# Patient Record
Sex: FEMALE | Race: WHITE | NOT HISPANIC OR LATINO | Employment: FULL TIME | URBAN - METROPOLITAN AREA
[De-identification: names, ages, dates, MRNs, and addresses within clinical notes are randomized per-mention and may not be internally consistent; named-entity substitution may affect disease eponyms.]

---

## 2023-04-13 DIAGNOSIS — F41.9 ANXIETY: Primary | ICD-10-CM

## 2023-04-14 DIAGNOSIS — F41.9 ANXIETY: ICD-10-CM

## 2023-04-14 RX ORDER — PHENTERMINE HYDROCHLORIDE 37.5 MG/1
TABLET ORAL
COMMUNITY
Start: 2022-04-06 | End: 2023-05-25 | Stop reason: ALTCHOICE

## 2023-04-14 RX ORDER — BUPROPION HYDROCHLORIDE 150 MG/1
1 TABLET ORAL DAILY
COMMUNITY
Start: 2022-12-21 | End: 2023-05-25 | Stop reason: ALTCHOICE

## 2023-04-14 RX ORDER — SERTRALINE HYDROCHLORIDE 50 MG/1
50 TABLET, FILM COATED ORAL DAILY
Qty: 90 TABLET | Refills: 0 | OUTPATIENT
Start: 2023-04-14 | End: 2023-07-13

## 2023-04-14 RX ORDER — SERTRALINE HYDROCHLORIDE 50 MG/1
1 TABLET, FILM COATED ORAL DAILY
COMMUNITY
Start: 2019-10-25 | End: 2023-04-14 | Stop reason: SDUPTHER

## 2023-04-14 RX ORDER — PHENTERMINE AND TOPIRAMATE 7.5; 46 MG/1; MG/1
1 CAPSULE, EXTENDED RELEASE ORAL DAILY
COMMUNITY
Start: 2022-09-28 | End: 2023-05-25 | Stop reason: ALTCHOICE

## 2023-04-14 RX ORDER — SERTRALINE HYDROCHLORIDE 50 MG/1
50 TABLET, FILM COATED ORAL DAILY
Qty: 90 TABLET | Refills: 0 | Status: SHIPPED | OUTPATIENT
Start: 2023-04-14 | End: 2023-04-28 | Stop reason: SDUPTHER

## 2023-04-14 RX ORDER — NALTREXONE HYDROCHLORIDE 50 MG/1
1 TABLET, FILM COATED ORAL DAILY
COMMUNITY
Start: 2022-12-21 | End: 2023-05-25 | Stop reason: ALTCHOICE

## 2023-04-14 NOTE — TELEPHONE ENCOUNTER
Patient requesting 90 day supply per insurance. Last seen 11/21/22, next appt 5/4/23      Sent  AB

## 2023-04-24 LAB
ACTIVATED PARTIAL THROMBOPLASTIN TIME IN PPP BY COAGULATION ASSAY: 32 SEC (ref 26–39)
ALANINE AMINOTRANSFERASE (SGPT) (U/L) IN SER/PLAS: 12 U/L (ref 7–45)
ALBUMIN (G/DL) IN SER/PLAS: 3.8 G/DL (ref 3.4–5)
ALKALINE PHOSPHATASE (U/L) IN SER/PLAS: 61 U/L (ref 33–110)
ANION GAP IN SER/PLAS: 12 MMOL/L (ref 10–20)
ASPARTATE AMINOTRANSFERASE (SGOT) (U/L) IN SER/PLAS: 14 U/L (ref 9–39)
BILIRUBIN TOTAL (MG/DL) IN SER/PLAS: 0.4 MG/DL (ref 0–1.2)
CALCIUM (MG/DL) IN SER/PLAS: 10.6 MG/DL (ref 8.6–10.6)
CARBON DIOXIDE, TOTAL (MMOL/L) IN SER/PLAS: 24 MMOL/L (ref 21–32)
CHLORIDE (MMOL/L) IN SER/PLAS: 108 MMOL/L (ref 98–107)
CREATININE (MG/DL) IN SER/PLAS: 0.61 MG/DL (ref 0.5–1.05)
ERYTHROCYTE DISTRIBUTION WIDTH (RATIO) BY AUTOMATED COUNT: 13.1 % (ref 11.5–14.5)
ERYTHROCYTE MEAN CORPUSCULAR HEMOGLOBIN CONCENTRATION (G/DL) BY AUTOMATED: 32.8 G/DL (ref 32–36)
ERYTHROCYTE MEAN CORPUSCULAR VOLUME (FL) BY AUTOMATED COUNT: 89 FL (ref 80–100)
ERYTHROCYTES (10*6/UL) IN BLOOD BY AUTOMATED COUNT: 4.53 X10E12/L (ref 4–5.2)
GFR FEMALE: >90 ML/MIN/1.73M2
GLUCOSE (MG/DL) IN SER/PLAS: 83 MG/DL (ref 74–99)
HEMATOCRIT (%) IN BLOOD BY AUTOMATED COUNT: 40.5 % (ref 36–46)
HEMOGLOBIN (G/DL) IN BLOOD: 13.3 G/DL (ref 12–16)
INR IN PPP BY COAGULATION ASSAY: 0.9 (ref 0.9–1.1)
LEUKOCYTES (10*3/UL) IN BLOOD BY AUTOMATED COUNT: 7.6 X10E9/L (ref 4.4–11.3)
NRBC (PER 100 WBCS) BY AUTOMATED COUNT: 0 /100 WBC (ref 0–0)
PLATELETS (10*3/UL) IN BLOOD AUTOMATED COUNT: 279 X10E9/L (ref 150–450)
POTASSIUM (MMOL/L) IN SER/PLAS: 4.6 MMOL/L (ref 3.5–5.3)
PROTEIN TOTAL: 7 G/DL (ref 6.4–8.2)
PROTHROMBIN TIME (PT) IN PPP BY COAGULATION ASSAY: 10.7 SEC (ref 9.8–13.4)
SODIUM (MMOL/L) IN SER/PLAS: 139 MMOL/L (ref 136–145)
UREA NITROGEN (MG/DL) IN SER/PLAS: 17 MG/DL (ref 6–23)

## 2023-04-28 ENCOUNTER — TELEPHONE (OUTPATIENT)
Dept: PRIMARY CARE | Facility: CLINIC | Age: 53
End: 2023-04-28
Payer: COMMERCIAL

## 2023-04-28 DIAGNOSIS — F41.9 ANXIETY: ICD-10-CM

## 2023-04-28 RX ORDER — SERTRALINE HYDROCHLORIDE 50 MG/1
50 TABLET, FILM COATED ORAL DAILY
Qty: 14 TABLET | Refills: 0 | Status: SHIPPED | OUTPATIENT
Start: 2023-04-28 | End: 2023-05-25 | Stop reason: SDUPTHER

## 2023-04-28 NOTE — TELEPHONE ENCOUNTER
PATIENT IS WONDERING IF A WEEKS WORTH OF SERTRALINE CAN BE CALLED INTO [facility], SHE IS HAVING DELIVERY ISSUES WITH [facility].      Sent in med to pharmacy   AB

## 2023-05-04 ENCOUNTER — APPOINTMENT (OUTPATIENT)
Dept: PRIMARY CARE | Facility: CLINIC | Age: 53
End: 2023-05-04
Payer: COMMERCIAL

## 2023-05-08 ENCOUNTER — HOSPITAL ENCOUNTER (OUTPATIENT)
Dept: DATA CONVERSION | Facility: HOSPITAL | Age: 53
End: 2023-05-08
Attending: SURGERY | Admitting: SURGERY
Payer: COMMERCIAL

## 2023-05-08 DIAGNOSIS — E21.0 PRIMARY HYPERPARATHYROIDISM (MULTI): ICD-10-CM

## 2023-05-08 DIAGNOSIS — D34 BENIGN NEOPLASM OF THYROID GLAND: ICD-10-CM

## 2023-05-08 DIAGNOSIS — D35.1 BENIGN NEOPLASM OF PARATHYROID GLAND: ICD-10-CM

## 2023-05-08 DIAGNOSIS — E04.1 NONTOXIC SINGLE THYROID NODULE: ICD-10-CM

## 2023-05-08 DIAGNOSIS — F41.9 ANXIETY DISORDER, UNSPECIFIED: ICD-10-CM

## 2023-05-08 LAB
INTRAOPERATIVE PTH: 1898 PG/ML (ref 12–88)
INTRAOPERATIVE PTH: 55 PG/ML (ref 12–88)
INTRAOPERATIVE PTH: 57 PG/ML (ref 12–88)
INTRAOPERATIVE PTH: 80 PG/ML (ref 12–88)
INTRAOPERATIVE PTH: 80 PG/ML (ref 12–88)

## 2023-05-25 ENCOUNTER — OFFICE VISIT (OUTPATIENT)
Dept: PRIMARY CARE | Facility: CLINIC | Age: 53
End: 2023-05-25
Payer: COMMERCIAL

## 2023-05-25 VITALS
BODY MASS INDEX: 28.73 KG/M2 | SYSTOLIC BLOOD PRESSURE: 107 MMHG | TEMPERATURE: 97.8 F | WEIGHT: 178 LBS | DIASTOLIC BLOOD PRESSURE: 69 MMHG | HEART RATE: 64 BPM

## 2023-05-25 DIAGNOSIS — F41.9 ANXIETY: ICD-10-CM

## 2023-05-25 PROBLEM — E66.3 OVERWEIGHT (BMI 25.0-29.9): Status: ACTIVE | Noted: 2023-05-25

## 2023-05-25 PROBLEM — E04.9 EUTHYROID GOITER: Status: ACTIVE | Noted: 2023-05-25

## 2023-05-25 PROBLEM — E21.0 PRIMARY HYPERPARATHYROIDISM (MULTI): Status: ACTIVE | Noted: 2023-05-25

## 2023-05-25 PROBLEM — E83.52 HYPERCALCEMIA: Status: ACTIVE | Noted: 2023-05-25

## 2023-05-25 PROBLEM — E04.2 MULTINODULAR GOITER: Status: ACTIVE | Noted: 2023-05-25

## 2023-05-25 PROBLEM — R79.89 ELEVATED PARATHYROID HORMONE: Status: ACTIVE | Noted: 2023-05-25

## 2023-05-25 PROBLEM — R00.2 PALPITATIONS: Status: ACTIVE | Noted: 2023-05-25

## 2023-05-25 PROBLEM — F32.A DEPRESSION: Status: ACTIVE | Noted: 2023-05-25

## 2023-05-25 PROBLEM — E78.5 HYPERLIPIDEMIA: Status: ACTIVE | Noted: 2023-05-25

## 2023-05-25 LAB
COMPLETE PATHOLOGY REPORT: NORMAL
CONVERTED CLINICAL DIAGNOSIS-HISTORY: NORMAL
CONVERTED FINAL DIAGNOSIS: NORMAL
CONVERTED FINAL REPORT PDF LINK TO COPY AND PASTE: NORMAL
CONVERTED GROSS DESCRIPTION: NORMAL

## 2023-05-25 PROCEDURE — 1036F TOBACCO NON-USER: CPT | Performed by: FAMILY MEDICINE

## 2023-05-25 PROCEDURE — 99213 OFFICE O/P EST LOW 20 MIN: CPT | Performed by: FAMILY MEDICINE

## 2023-05-25 RX ORDER — SERTRALINE HYDROCHLORIDE 50 MG/1
50 TABLET, FILM COATED ORAL DAILY
Qty: 90 TABLET | Refills: 1 | Status: SHIPPED | OUTPATIENT
Start: 2023-05-25 | End: 2024-01-28 | Stop reason: SDUPTHER

## 2023-05-25 ASSESSMENT — ENCOUNTER SYMPTOMS
DEPRESSION: 0
LOSS OF SENSATION IN FEET: 0
OCCASIONAL FEELINGS OF UNSTEADINESS: 0

## 2023-05-25 ASSESSMENT — PATIENT HEALTH QUESTIONNAIRE - PHQ9
2. FEELING DOWN, DEPRESSED OR HOPELESS: NOT AT ALL
SUM OF ALL RESPONSES TO PHQ9 QUESTIONS 1 & 2: 0
1. LITTLE INTEREST OR PLEASURE IN DOING THINGS: NOT AT ALL

## 2023-05-25 ASSESSMENT — LIFESTYLE VARIABLES
HOW MANY STANDARD DRINKS CONTAINING ALCOHOL DO YOU HAVE ON A TYPICAL DAY: PATIENT DOES NOT DRINK
AUDIT-C TOTAL SCORE: 0
SKIP TO QUESTIONS 9-10: 1
HOW OFTEN DO YOU HAVE SIX OR MORE DRINKS ON ONE OCCASION: NEVER
HOW OFTEN DO YOU HAVE A DRINK CONTAINING ALCOHOL: NEVER

## 2023-05-25 NOTE — PROGRESS NOTES
Subjective   Patient ID: Alisa Bland is a 52 y.o. female who presents for Medication Refill.     HPI   She continues on sertraline 50 mg as it is controlling her symptoms well. She does not voice any side effects. Pt needs refill.  Anxiety and depression s/s I remission    She had a parathyroid surgery two weeks ago, and has a follow up visit in June. Her voice is still raspy. She notes it gets worse as the day goes on. She has not noticed any improvement as time goes on. She will call dr rapp's office and talk to his asst, she states was suppose to follow up in 2 weeks after surgery but could not get appt.     She is due for colonoscopy but would like to wait as she recovers from surgery     Review of Systems    Objective   /69   Pulse 64   Temp 36.6 °C (97.8 °F)   Wt 80.7 kg (178 lb)   BMI 28.73 kg/m²     Physical Exam  Constitutional:       Appearance: Normal appearance.   Neck:      Comments: Anterior incisions are clean and dry and healing well  Cardiovascular:      Rate and Rhythm: Normal rate and regular rhythm.      Pulses: Normal pulses.      Heart sounds: Normal heart sounds.   Pulmonary:      Effort: Pulmonary effort is normal.      Breath sounds: Normal breath sounds.   Musculoskeletal:         General: No tenderness. Normal range of motion.   Skin:     General: Skin is warm and dry.   Neurological:      Mental Status: She is alert and oriented to person, place, and time.   Psychiatric:         Mood and Affect: Mood normal.         Thought Content: Thought content normal.         Judgment: Judgment normal.         Assessment/Plan     Anxiety   Continue on sertraline 50 mg daily. Prescription sent to [facility].     Horse voice Secondary to surgery  Advised to call Surgeon office to ask about what to expect of voice.     Follow up in 6 months, unless a visit is needed sooner.     Scribe Attestation  By signing my name below, Savi LEVY , Paola   attest that this documentation has been  prepared under the direction and in the presence of Karlene Sandoval DO.

## 2023-05-25 NOTE — PATIENT INSTRUCTIONS
Anxiety   Continue on sertraline 50 mg daily. Prescription sent to [facility].     Horse voice Secondary to surgery  Advised to call Surgeon to ask about what to expect of voice.

## 2023-06-09 LAB
CALCIDIOL (25 OH VITAMIN D3) (NG/ML) IN SER/PLAS: 41 NG/ML
CALCIUM (MG/DL) IN SER/PLAS: 9.2 MG/DL (ref 8.6–10.6)
PARATHYRIN INTACT (PG/ML) IN SER/PLAS: 32.5 PG/ML (ref 18.5–88)
THYROTROPIN (MIU/L) IN SER/PLAS BY DETECTION LIMIT <= 0.05 MIU/L: 3.02 MIU/L (ref 0.44–3.98)
THYROXINE (T4) FREE (NG/DL) IN SER/PLAS: 1.02 NG/DL (ref 0.78–1.48)

## 2023-09-07 VITALS
SYSTOLIC BLOOD PRESSURE: 102 MMHG | HEART RATE: 60 BPM | HEIGHT: 66 IN | TEMPERATURE: 97.7 F | DIASTOLIC BLOOD PRESSURE: 60 MMHG | WEIGHT: 178.13 LBS | RESPIRATION RATE: 16 BRPM | BODY MASS INDEX: 28.63 KG/M2

## 2023-09-14 NOTE — H&P
History of Present Illness:   Pregnant/Lactating:  ·  Are You Pregnant no   ·  Are You Currently Breastfeeding no     History Present Illness:  Reason for surgery: Rt thyroid nodule and primary  hyperparathyroidism   HPI:    53 y/o WF with Rt thyroid nodule and primary hyperparathyroidism.  Her sestamibi and u/s show Rt inf adenoma.  She also has a known Rt thyroid nodule with prior benign  bx.      Allergies:        Allergies:  ·  No Known Allergies :     Home Medication Review:   Home Medications Reviewed: yes     Impression/Procedure:   ·  Impression and Planned Procedure: Rt Thyroid lobectomy and parathyroidectomy       ERAS (Enhanced Recovery After Surgery):  ·  ERAS Patient: no       Vital Signs:  Temperature C: 36.5 degrees C   Temperature F: 97.7 degrees F   Heart Rate: 60 beats per minute   Respiratory Rate: 16 breath per minute   Blood Pressure Systolic: 102 mm/Hg   Blood Pressure Diastolic: 60 mm/Hg     Physical Exam by System:    Constitutional: Well developed, awake/alert/oriented  x3, no distress, alert and cooperative   ENMT: mucous membranes moist, no apparent injury,  no lesions seen   Head/Neck: Palpable Rt thyroid nodule, moves easily  upon swallowing   Respiratory/Thorax: Patent airways, CTAB, normal  breath sounds with good chest expansion, thorax symmetric   Cardiovascular: Regular, rate and rhythm, no murmurs,  2+ equal pulses of the extremities, normal S 1and S 2     Consent:   COVID-19 Consent:  ·  COVID-19 Risk Consent Surgeon has reviewed key risks related to the risk of lg COVID-19 and if they contract COVID-19 what the risks are.       Electronic Signatures:  Sen Arnold)  (Signed 08-May-2023 07:15)   Authored: History of Present Illness, Allergies, Home  Medication Review, Impression/Procedure, ERAS, Physical Exam, Consent, Note Completion      Last Updated: 08-May-2023 07:15 by Sen Arnold)

## 2023-10-02 NOTE — OP NOTE
PROCEDURE DETAILS    Preoperative Diagnosis:  Primary Hyperparathyroidism; Multinodular Thyroid  Postoperative Diagnosis:  Primary Hyperparathyroidism; Multinodular Thyroid  Surgeon: Sen Arnold  Resident/Fellow/Other Assistant: Brie PGY2    Procedure:  1. Parathyroidectomy  2. Right thyroid lobectomy  Anesthesia: General  Estimated Blood Loss: 10cc  Findings: Enlarged right superior parathyroid adenoma, multinodular thyroid in bilateral lobes (R>L), PTH 80 preop, 1898 stimulated, 80 5min, 57 10min, 55 5min, Right RLN identified  Specimens(s) Collected: yes,  Right Superior Parathyroid, Right Thyroid Lobe   Complications: None at this time  Patient Returned To/Condition: PACU/Satisfactory    Date of Dictation: 08-May-2023  Dictation Job Number: [id number]                            Attestation:   Note Completion:  Attending Attestation I was present for the entire procedure    I am a: Resident/Fellow         Electronic Signatures:  Glo Baird (Resident))  (Signed 08-May-2023 10:24)   Authored: Post-Operative Note, Chart Review, Note Completion  Sen Arnold)  (Signed 08-May-2023 10:35)   Authored: Post-Operative Note, Chart Review, Note Completion   Co-Signer: Post-Operative Note, Chart Review, Note Completion      Last Updated: 08-May-2023 10:35 by Sen Arnold)

## 2023-10-20 DIAGNOSIS — E66.3 OVERWEIGHT (BMI 25.0-29.9): ICD-10-CM

## 2023-10-20 RX ORDER — PHENTERMINE HYDROCHLORIDE 37.5 MG/1
TABLET ORAL
COMMUNITY
End: 2023-10-20 | Stop reason: SDUPTHER

## 2023-10-20 RX ORDER — PHENTERMINE HYDROCHLORIDE 37.5 MG/1
37.5 CAPSULE ORAL
COMMUNITY
End: 2023-10-20 | Stop reason: SDUPTHER

## 2023-10-20 RX ORDER — PHENTERMINE HYDROCHLORIDE 37.5 MG/1
TABLET ORAL
Qty: 30 TABLET | Refills: 2 | Status: SHIPPED | OUTPATIENT
Start: 2023-10-20 | End: 2024-04-08 | Stop reason: SDUPTHER

## 2023-10-20 NOTE — TELEPHONE ENCOUNTER
Lov: 8/29/2023  Nov: 11/28/2023  Patient calling in to schedule group and requesting refill on Phentermine  Script pended

## 2023-10-24 ENCOUNTER — APPOINTMENT (OUTPATIENT)
Dept: ENDOCRINOLOGY | Facility: CLINIC | Age: 53
End: 2023-10-24
Payer: COMMERCIAL

## 2023-11-27 NOTE — PROGRESS NOTES
"Subjective   Alisaheather Waters \"Charlotte\" is a 53 y.o. female with a hx of *** who presents for weight management and obesity.    History of Present Illness  Ms. WATERS is a 53 year year old female with a history of primary HPT, hypercalcemia, meniscus tear, knee arthritis (s/p cortisol shots), who presents for medical weight management.     She sees Peter Roy for her hypercalcemia / primary HPT.  Referred to me to discuss weight management.     Nutrition: working on Mediterranean diet, has limited eating out since last meeting. \"I have a better mindset with food\"      Medication: is taking full tablet of Phentermine- \"seems to be working well\"      Exercise: has not increased walking (cortisone injections into knees.) Doing yoga and riding pelAyi Lailen bike 4 days a week      Stress: managed     Goal: increase water intake       1. Nutrition: ***    2. Sleep: ***     3. Stress: ***     4. Exercise: ***     5. Appetite control: ***  AOM currently taking: ***    6. Goal: ***      Current Outpatient Medications:     phentermine (Adipex-P) 37.5 mg tablet, TAKE 1 TABLET BY MOUTH ONCE DAILY., BMI 29 *NOT COVERED, Disp: 30 tablet, Rfl: 2    sertraline (Zoloft) 50 mg tablet, Take 1 tablet (50 mg) by mouth once daily., Disp: 90 tablet, Rfl: 1    ROS:  System: normal  Eyes : no visual changes  Neck : no tenderness, no new lumps/bumps  Respiratory : no SOB  Cardiovascular : no chest pain, no palpitations  Gastro-Intestinal : no abdominal concerns  Neurological : no numbness or tingling in the extremities  Musculoskeletal : no joint paint, no muscle pain  Skin : no unusual rashes  Psychiatric : no depression, no anxiety  See HPI for Endocrine ROS    Past Medical History:   Diagnosis Date    Personal history of other specified conditions 08/17/2018    History of fatigue    Personal history of other specified conditions 01/29/2016    History of weight gain       Past Surgical History:   Procedure Laterality Date    BREAST SURGERY  " 02/24/2015    Breast Surgery       Social History     Socioeconomic History    Marital status:      Spouse name: Not on file    Number of children: Not on file    Years of education: Not on file    Highest education level: Not on file   Occupational History    Not on file   Tobacco Use    Smoking status: Never    Smokeless tobacco: Never   Substance and Sexual Activity    Alcohol use: Not Currently    Drug use: Not Currently    Sexual activity: Not on file   Other Topics Concern    Not on file   Social History Narrative    Not on file     Social Determinants of Health     Financial Resource Strain: Not on file   Food Insecurity: Not on file   Transportation Needs: Not on file   Physical Activity: Not on file   Stress: Not on file   Social Connections: Not on file   Intimate Partner Violence: Not on file   Housing Stability: Not on file       Objective    Physical Exam:  There were no vitals taken for this visit.  General : alert and oriented X3, no acute distress  Eyes : EOMI     Provider Impressions        Ms. WATERS is a 53 year year old female with a history of primary HPT, hypercalcemia, meniscus tear, knee arthritis (s/p cortisol shots), who presents for medical weight management.     She sees Peter Roy for her hypercalcemia / primary HPT.     1. Weight Management : Reviewed the principles of energy metabolism, caloric intake and expenditure, and rationale for a treatment program. Also reinforced need for reduced calorie, low fat diet and increased physical activity.     2. Nutrition : She is currently doing weight watchers  4/6/22 : 184lb, 29.72kg/m2  5/2/22: 178.2lb, 28.75kg/m2  6/1/22: 171.8lb, 27.68kg/m2  8/23/22: 169lb, 27.28kg/m2  9/28/22 : 166lb, 26.83kg/m2  10/25/22 : 168lb, 27kg/m2  12/21/22 : 169lb, 27.88kg/m2  2/8/23 : 170lb, 27.44kg/m2  7/11/23 : 181lb, 29.21kg/m2  8/29/23: 177.7lb, 28.64kg/m2     -- s/p parathyroidectomy -- feels SO much more energetic since the surgery!     3. Sleep :  is fine.     4. Stress : stable.     5. Exercise : minimal now, besides doing more Yoga.  got knee steroid injections.     6. Appetite : Taking Phentermine half tablet a day. Helping with appetite and helping to make her feel more full. Feels that it has helped her stay in control and fight cravings.   4/6/22 : took phentermine for 3 months.     Tried : Qsymia - but she had side effects of extreme fatigue - stopped.  Wrote for: Buproprion XL 150mg/d, and Naltrexone 50mg --NEVER TOOK.     --would like to re-start phentermine, contract signed and will check OAR[place].  doing well on a full tablet of phentermine a day.     7. Goal : to up her water.     FU in about a month.  Kris Tapia MD

## 2023-11-28 ENCOUNTER — APPOINTMENT (OUTPATIENT)
Dept: ENDOCRINOLOGY | Facility: CLINIC | Age: 53
End: 2023-11-28
Payer: COMMERCIAL

## 2023-12-07 ENCOUNTER — OFFICE VISIT (OUTPATIENT)
Dept: OBSTETRICS AND GYNECOLOGY | Facility: CLINIC | Age: 53
End: 2023-12-07
Payer: COMMERCIAL

## 2023-12-07 VITALS
HEIGHT: 66 IN | SYSTOLIC BLOOD PRESSURE: 110 MMHG | DIASTOLIC BLOOD PRESSURE: 74 MMHG | BODY MASS INDEX: 28.93 KG/M2 | WEIGHT: 180 LBS

## 2023-12-07 DIAGNOSIS — Z12.31 ENCOUNTER FOR SCREENING MAMMOGRAM FOR BREAST CANCER: ICD-10-CM

## 2023-12-07 DIAGNOSIS — Z01.419 WELL WOMAN EXAM: ICD-10-CM

## 2023-12-07 PROCEDURE — 1036F TOBACCO NON-USER: CPT | Performed by: OBSTETRICS & GYNECOLOGY

## 2023-12-07 PROCEDURE — 88175 CYTOPATH C/V AUTO FLUID REDO: CPT

## 2023-12-07 PROCEDURE — 99396 PREV VISIT EST AGE 40-64: CPT | Performed by: OBSTETRICS & GYNECOLOGY

## 2023-12-07 ASSESSMENT — PAIN SCALES - GENERAL: PAINLEVEL: 0-NO PAIN

## 2023-12-07 NOTE — PROGRESS NOTES
"Alisa Bland \"David" is a 53 y.o. female who is here for a routine exam. PCP = Karlene Sandoval DO  Had parathyroid and thyroid surgery    Menses : may 2023  Contraception : othernone  HPV vaccine : No  Last pap : 2022 normal  Last HPV : 2022 negative  History of abnormal pap : No  Last mammogram : 2022 normal  History of abnormal mammogram : No  Colon cancer screen : has order to schedule colonoscopy    ROS  systems reviewed, anything negative noted in HPI    bladder: no dysuria, gross hematuria, urinary frequency, urgency or incontinence  breast: no lumps, nipple d/c, overlying skin changes, redness, or skin retraction    [unfilled]    Past med hx and past surg hx reviewed and notable for: see above    Objective   /74   Ht 1.676 m (5' 6\")   Wt 81.6 kg (180 lb)   BMI 29.05 kg/m²      General:   Alert and oriented, in no acute distress   Neck: Supple. No visible thyromegaly.    Breast/Axilla: Normal to palpation bilaterally without masses, skin changes, lymphadenopathy, or nipple discharge.    Abdomen: Soft, non-tender, without masses or organomegaly   Vulva: Normal architecture without erythema, masses, or lesions.    Vagina: Normal mucosa without lesions, masses, or atrophy. No abnormal vaginal discharge.    Cervix: Normal without masses, lesions, or signs of cervicitis.    Uterus: Normal mobile, non-enlarged uterus    Adnexa: Normal without masses or lesions   Pelvic Floor No POP noted.    Psych Normal affect. Normal mood.      Thank you for coming to your annual exam. Your findings during the exam were normal. Specific topics addressed during this exam included: healthy lifestyle, well woman screening guidelines,     Actions performed during this visit include:  - Clinical breast exam  - Clinical pelvic exam  - pap  - mammogram per PCP  No orders of the defined types were placed in this encounter.          Please return for your next visit in 1 year.  "

## 2024-01-09 LAB
CYTOLOGY CMNT CVX/VAG CYTO-IMP: NORMAL
LAB AP HPV GENOTYPE QUESTION: YES
LAB AP HPV HR: NORMAL
LABORATORY COMMENT REPORT: NORMAL
PATH REPORT.TOTAL CANCER: NORMAL

## 2024-01-09 NOTE — PROGRESS NOTES
History of Present Illness  Ms. WATERS is a 53 year year old female with a history of primary HPT, hypercalcemia, meniscus tear, knee arthritis (s/p cortisol shots), who presents for medical weight management.     She sees Peter Roy for her hypercalcemia / primary HPT.    VIRTUAL:  1. Nutrition: Went off track over the holidays. Is starting a whole reset to get back on track. Eats salad with every meal and eats the salad first. Trying to cut back evening snacking.     2. Sleep: stable      3. Stress:  managed     4. Exercise:  Started back exercising since Jan 1st- 20 minutes of yoga daily     5. Appetite control: controlled   AOM currently taking: Phentermine     6. Goal: cut back on after dinner snacking        Current Outpatient Medications:     phentermine (Adipex-P) 37.5 mg tablet, TAKE 1 TABLET BY MOUTH ONCE DAILY., BMI 29 *NOT COVERED, Disp: 30 tablet, Rfl: 2    sertraline (Zoloft) 50 mg tablet, Take 1 tablet (50 mg) by mouth once daily., Disp: 90 tablet, Rfl: 1    ROS:  System: normal  Eyes : no visual changes  Neck : no tenderness, no new lumps/bumps  Respiratory : no SOB  Cardiovascular : no chest pain, no palpitations  Gastro-Intestinal : no abdominal concerns  Neurological : no numbness or tingling in the extremities  Musculoskeletal : no joint paint, no muscle pain  Skin : no unusual rashes  Psychiatric : no depression, no anxiety  See HPI for Endocrine ROS    Past Medical History:   Diagnosis Date    Personal history of other specified conditions 08/17/2018    History of fatigue    Personal history of other specified conditions 01/29/2016    History of weight gain       Past Surgical History:   Procedure Laterality Date    BREAST SURGERY  02/24/2015    Breast Surgery       Social History     Socioeconomic History    Marital status:      Spouse name: Not on file    Number of children: Not on file    Years of education: Not on file    Highest education level: Not on file   Occupational History  "   Not on file   Tobacco Use    Smoking status: Never    Smokeless tobacco: Never   Substance and Sexual Activity    Alcohol use: Not Currently    Drug use: Not Currently    Sexual activity: Not on file   Other Topics Concern    Not on file   Social History Narrative    Not on file     Social Determinants of Health     Financial Resource Strain: Not on file   Food Insecurity: Not on file   Transportation Needs: Not on file   Physical Activity: Not on file   Stress: Not on file   Social Connections: Not on file   Intimate Partner Violence: Not on file   Housing Stability: Not on file       Objective    Physical Exam:  There were no vitals taken for this visit.  General : alert and oriented X3, no acute distress  Eyes : EOMI     Provider Impressions  Ms. WATERS is a 53 year year old female with a history of primary HPT, hypercalcemia, meniscus tear, knee arthritis (s/p cortisol shots), who presents for medical weight management.     She sees Peter Roy for her hypercalcemia / primary HPT.     1. Weight Management : Reviewed the principles of energy metabolism, caloric intake and expenditure, and rationale for a treatment program. Also reinforced need for reduced calorie, low fat diet and increased physical activity.     2. Nutrition : She is currently doing weight watchers  4/6/22 : 184lb, 29.72kg/m2  5/2/22: 178.2lb, 28.75kg/m2  6/1/22: 171.8lb, 27.68kg/m2  8/23/22: 169lb, 27.28kg/m2  9/28/22 : 166lb, 26.83kg/m2  10/25/22 : 168lb, 27kg/m2  12/21/22 : 169lb, 27.88kg/m2  2/8/23 : 170lb, 27.44kg/m2  7/11/23 : 181lb, 29.21kg/m2  8/29/23: 177.7lb, 28.64kg/m2  1/10/24: 178.2lb, 28.76kg/m2     3. Sleep : is fine.     4. Stress : was high around the holidays and having her kids at home.  She felt like she was a \"hot mess\".     5. Exercise : minimal now, besides doing more Yoga.  got knee steroid injections.     6. Appetite : Taking Phentermine half tablet a day. Helping with appetite and helping to make her feel more " full. Feels that it has helped her stay in control and fight cravings.   4/6/22 : took phentermine for 3 months.     Tried : Qsymia - but she had side effects of extreme fatigue - stopped.  Wrote for: Buproprion XL 150mg/d, and Naltrexone 50mg --NEVER TOOK.     --taking phentermine.     7. Goal : to focus on decreasing the after dinner snacks.     FU in about a month.  Revital Vicente Tapia MD

## 2024-01-10 ENCOUNTER — OFFICE VISIT (OUTPATIENT)
Dept: ENDOCRINOLOGY | Facility: CLINIC | Age: 54
End: 2024-01-10
Payer: COMMERCIAL

## 2024-01-10 VITALS — HEIGHT: 66 IN | BODY MASS INDEX: 28.64 KG/M2 | WEIGHT: 178.2 LBS

## 2024-01-10 DIAGNOSIS — Z76.89 ENCOUNTER FOR WEIGHT MANAGEMENT: ICD-10-CM

## 2024-01-10 DIAGNOSIS — E66.3 OVERWEIGHT: ICD-10-CM

## 2024-01-10 PROCEDURE — 3008F BODY MASS INDEX DOCD: CPT | Performed by: INTERNAL MEDICINE

## 2024-01-10 PROCEDURE — 99215 OFFICE O/P EST HI 40 MIN: CPT | Performed by: INTERNAL MEDICINE

## 2024-01-10 PROCEDURE — 1036F TOBACCO NON-USER: CPT | Performed by: INTERNAL MEDICINE

## 2024-01-17 DIAGNOSIS — F41.9 ANXIETY: ICD-10-CM

## 2024-01-26 ENCOUNTER — TELEPHONE (OUTPATIENT)
Dept: PRIMARY CARE | Facility: CLINIC | Age: 54
End: 2024-01-26
Payer: COMMERCIAL

## 2024-01-26 RX ORDER — SERTRALINE HYDROCHLORIDE 50 MG/1
50 TABLET, FILM COATED ORAL DAILY
Qty: 90 TABLET | Refills: 1 | OUTPATIENT
Start: 2024-01-26

## 2024-01-26 NOTE — TELEPHONE ENCOUNTER
Patient needs refill of sertraline 50 mg [facility] [address]  Next office visit 2/29/2024.  Patient would also like order for labs if needed.

## 2024-01-28 DIAGNOSIS — F41.9 ANXIETY: ICD-10-CM

## 2024-01-28 DIAGNOSIS — Z00.00 PHYSICAL EXAM: Primary | ICD-10-CM

## 2024-01-28 RX ORDER — SERTRALINE HYDROCHLORIDE 50 MG/1
50 TABLET, FILM COATED ORAL DAILY
Qty: 30 TABLET | Refills: 0 | Status: SHIPPED | OUTPATIENT
Start: 2024-01-28 | End: 2024-03-07 | Stop reason: SDUPTHER

## 2024-02-07 ENCOUNTER — HOSPITAL ENCOUNTER (OUTPATIENT)
Dept: RADIOLOGY | Facility: CLINIC | Age: 54
Discharge: HOME | End: 2024-02-07
Payer: COMMERCIAL

## 2024-02-07 VITALS — BODY MASS INDEX: 28.91 KG/M2 | WEIGHT: 179.9 LBS | HEIGHT: 66 IN

## 2024-02-07 DIAGNOSIS — Z12.31 ENCOUNTER FOR SCREENING MAMMOGRAM FOR MALIGNANT NEOPLASM OF BREAST: ICD-10-CM

## 2024-02-07 PROCEDURE — 77067 SCR MAMMO BI INCL CAD: CPT

## 2024-02-07 PROCEDURE — 77063 BREAST TOMOSYNTHESIS BI: CPT | Performed by: STUDENT IN AN ORGANIZED HEALTH CARE EDUCATION/TRAINING PROGRAM

## 2024-02-07 PROCEDURE — 77067 SCR MAMMO BI INCL CAD: CPT | Performed by: STUDENT IN AN ORGANIZED HEALTH CARE EDUCATION/TRAINING PROGRAM

## 2024-02-12 ENCOUNTER — TELEPHONE (OUTPATIENT)
Dept: PRIMARY CARE | Facility: CLINIC | Age: 54
End: 2024-02-12
Payer: COMMERCIAL

## 2024-02-12 NOTE — TELEPHONE ENCOUNTER
----- Message from Karlene Sandoval DO sent at 2/9/2024 10:28 PM EST -----  Report to pt her mamm is wnl and repeat one year

## 2024-02-21 ENCOUNTER — APPOINTMENT (OUTPATIENT)
Dept: ENDOCRINOLOGY | Facility: CLINIC | Age: 54
End: 2024-02-21
Payer: COMMERCIAL

## 2024-02-26 DIAGNOSIS — F41.9 ANXIETY: ICD-10-CM

## 2024-02-29 ENCOUNTER — APPOINTMENT (OUTPATIENT)
Dept: PRIMARY CARE | Facility: CLINIC | Age: 54
End: 2024-02-29
Payer: COMMERCIAL

## 2024-03-04 PROBLEM — N39.0 ACUTE UTI: Status: ACTIVE | Noted: 2024-03-04

## 2024-03-04 PROBLEM — N92.6 MENSTRUAL DISORDER: Status: ACTIVE | Noted: 2024-03-04

## 2024-03-04 PROBLEM — D34 BENIGN NEOPLASM OF THYROID GLAND: Status: ACTIVE | Noted: 2024-03-04

## 2024-03-04 PROBLEM — S83.209A TEAR OF MENISCUS OF KNEE: Status: ACTIVE | Noted: 2024-03-04

## 2024-03-04 PROBLEM — R63.5 WEIGHT GAIN: Status: ACTIVE | Noted: 2024-03-04

## 2024-03-04 PROBLEM — R39.9 SYMPTOMS INVOLVING URINARY SYSTEM: Status: ACTIVE | Noted: 2024-03-04

## 2024-03-04 PROBLEM — E89.2 STATUS POST PARATHYROIDECTOMY (CMS/HCC): Status: ACTIVE | Noted: 2024-03-04

## 2024-03-04 PROBLEM — R53.83 FATIGUE: Status: ACTIVE | Noted: 2024-03-04

## 2024-03-04 PROBLEM — E89.0 HISTORY OF PARTIAL THYROIDECTOMY: Status: ACTIVE | Noted: 2024-03-04

## 2024-03-04 PROBLEM — M25.561 RIGHT KNEE PAIN: Status: ACTIVE | Noted: 2024-03-04

## 2024-03-04 PROBLEM — R49.0 HOARSENESS OF VOICE: Status: ACTIVE | Noted: 2024-03-04

## 2024-03-04 PROBLEM — M17.12 PRIMARY OSTEOARTHRITIS OF LEFT KNEE: Status: ACTIVE | Noted: 2024-03-04

## 2024-03-06 ENCOUNTER — LAB (OUTPATIENT)
Dept: LAB | Facility: LAB | Age: 54
End: 2024-03-06
Payer: COMMERCIAL

## 2024-03-06 DIAGNOSIS — Z00.00 PHYSICAL EXAM: ICD-10-CM

## 2024-03-06 LAB
ALBUMIN SERPL BCP-MCNC: 3.9 G/DL (ref 3.4–5)
ALP SERPL-CCNC: 48 U/L (ref 33–110)
ALT SERPL W P-5'-P-CCNC: 9 U/L (ref 7–45)
ANION GAP SERPL CALC-SCNC: 12 MMOL/L (ref 10–20)
AST SERPL W P-5'-P-CCNC: 15 U/L (ref 9–39)
BASOPHILS # BLD AUTO: 0.08 X10*3/UL (ref 0–0.1)
BASOPHILS NFR BLD AUTO: 1.1 %
BILIRUB SERPL-MCNC: 0.4 MG/DL (ref 0–1.2)
BUN SERPL-MCNC: 21 MG/DL (ref 6–23)
CALCIUM SERPL-MCNC: 9.5 MG/DL (ref 8.6–10.6)
CHLORIDE SERPL-SCNC: 107 MMOL/L (ref 98–107)
CHOLEST SERPL-MCNC: 171 MG/DL (ref 0–199)
CHOLESTEROL/HDL RATIO: 5.4
CO2 SERPL-SCNC: 26 MMOL/L (ref 21–32)
CREAT SERPL-MCNC: 0.78 MG/DL (ref 0.5–1.05)
EGFRCR SERPLBLD CKD-EPI 2021: >90 ML/MIN/1.73M*2
EOSINOPHIL # BLD AUTO: 0.35 X10*3/UL (ref 0–0.7)
EOSINOPHIL NFR BLD AUTO: 5 %
ERYTHROCYTE [DISTWIDTH] IN BLOOD BY AUTOMATED COUNT: 12.5 % (ref 11.5–14.5)
GLUCOSE SERPL-MCNC: 87 MG/DL (ref 74–99)
HCT VFR BLD AUTO: 39.8 % (ref 36–46)
HDLC SERPL-MCNC: 31.9 MG/DL
HGB BLD-MCNC: 12.7 G/DL (ref 12–16)
IMM GRANULOCYTES # BLD AUTO: 0.01 X10*3/UL (ref 0–0.7)
IMM GRANULOCYTES NFR BLD AUTO: 0.1 % (ref 0–0.9)
LDLC SERPL CALC-MCNC: 95 MG/DL
LYMPHOCYTES # BLD AUTO: 2.42 X10*3/UL (ref 1.2–4.8)
LYMPHOCYTES NFR BLD AUTO: 34.8 %
MCH RBC QN AUTO: 28.1 PG (ref 26–34)
MCHC RBC AUTO-ENTMCNC: 31.9 G/DL (ref 32–36)
MCV RBC AUTO: 88 FL (ref 80–100)
MONOCYTES # BLD AUTO: 0.5 X10*3/UL (ref 0.1–1)
MONOCYTES NFR BLD AUTO: 7.2 %
NEUTROPHILS # BLD AUTO: 3.6 X10*3/UL (ref 1.2–7.7)
NEUTROPHILS NFR BLD AUTO: 51.8 %
NON HDL CHOLESTEROL: 139 MG/DL (ref 0–149)
NRBC BLD-RTO: 0 /100 WBCS (ref 0–0)
PLATELET # BLD AUTO: 280 X10*3/UL (ref 150–450)
POTASSIUM SERPL-SCNC: 4.7 MMOL/L (ref 3.5–5.3)
PROT SERPL-MCNC: 7.3 G/DL (ref 6.4–8.2)
RBC # BLD AUTO: 4.52 X10*6/UL (ref 4–5.2)
SODIUM SERPL-SCNC: 140 MMOL/L (ref 136–145)
TRIGL SERPL-MCNC: 222 MG/DL (ref 0–149)
VLDL: 44 MG/DL (ref 0–40)
WBC # BLD AUTO: 7 X10*3/UL (ref 4.4–11.3)

## 2024-03-06 PROCEDURE — 85025 COMPLETE CBC W/AUTO DIFF WBC: CPT

## 2024-03-06 PROCEDURE — 80053 COMPREHEN METABOLIC PANEL: CPT

## 2024-03-06 PROCEDURE — 80061 LIPID PANEL: CPT

## 2024-03-06 PROCEDURE — 36415 COLL VENOUS BLD VENIPUNCTURE: CPT

## 2024-03-07 ENCOUNTER — OFFICE VISIT (OUTPATIENT)
Dept: PRIMARY CARE | Facility: CLINIC | Age: 54
End: 2024-03-07
Payer: COMMERCIAL

## 2024-03-07 VITALS
HEIGHT: 66 IN | TEMPERATURE: 97.3 F | BODY MASS INDEX: 28.93 KG/M2 | RESPIRATION RATE: 16 BRPM | HEART RATE: 77 BPM | OXYGEN SATURATION: 94 % | DIASTOLIC BLOOD PRESSURE: 77 MMHG | SYSTOLIC BLOOD PRESSURE: 118 MMHG | WEIGHT: 180 LBS

## 2024-03-07 DIAGNOSIS — M79.642 HAND PAIN, LEFT: ICD-10-CM

## 2024-03-07 DIAGNOSIS — Z00.00 PHYSICAL EXAM: Primary | ICD-10-CM

## 2024-03-07 DIAGNOSIS — F41.9 ANXIETY: ICD-10-CM

## 2024-03-07 DIAGNOSIS — Z82.49 FH: CAD (CORONARY ARTERY DISEASE): ICD-10-CM

## 2024-03-07 DIAGNOSIS — F32.4 MAJOR DEPRESSIVE DISORDER WITH SINGLE EPISODE, IN PARTIAL REMISSION (CMS-HCC): ICD-10-CM

## 2024-03-07 DIAGNOSIS — E21.0 PRIMARY HYPERPARATHYROIDISM (MULTI): ICD-10-CM

## 2024-03-07 PROCEDURE — 99213 OFFICE O/P EST LOW 20 MIN: CPT | Performed by: FAMILY MEDICINE

## 2024-03-07 PROCEDURE — 99396 PREV VISIT EST AGE 40-64: CPT | Performed by: FAMILY MEDICINE

## 2024-03-07 PROCEDURE — 3008F BODY MASS INDEX DOCD: CPT | Performed by: FAMILY MEDICINE

## 2024-03-07 PROCEDURE — 1036F TOBACCO NON-USER: CPT | Performed by: FAMILY MEDICINE

## 2024-03-07 RX ORDER — SERTRALINE HYDROCHLORIDE 50 MG/1
50 TABLET, FILM COATED ORAL DAILY
Qty: 90 TABLET | Refills: 1 | Status: SHIPPED | OUTPATIENT
Start: 2024-03-07

## 2024-03-07 ASSESSMENT — PATIENT HEALTH QUESTIONNAIRE - PHQ9
SUM OF ALL RESPONSES TO PHQ9 QUESTIONS 1 AND 2: 2
7. TROUBLE CONCENTRATING ON THINGS, SUCH AS READING THE NEWSPAPER OR WATCHING TELEVISION: SEVERAL DAYS
10. IF YOU CHECKED OFF ANY PROBLEMS, HOW DIFFICULT HAVE THESE PROBLEMS MADE IT FOR YOU TO DO YOUR WORK, TAKE CARE OF THINGS AT HOME, OR GET ALONG WITH OTHER PEOPLE: SOMEWHAT DIFFICULT
8. MOVING OR SPEAKING SO SLOWLY THAT OTHER PEOPLE COULD HAVE NOTICED. OR THE OPPOSITE, BEING SO FIGETY OR RESTLESS THAT YOU HAVE BEEN MOVING AROUND A LOT MORE THAN USUAL: NOT AT ALL
2. FEELING DOWN, DEPRESSED OR HOPELESS: SEVERAL DAYS
6. FEELING BAD ABOUT YOURSELF - OR THAT YOU ARE A FAILURE OR HAVE LET YOURSELF OR YOUR FAMILY DOWN: NOT AT ALL
4. FEELING TIRED OR HAVING LITTLE ENERGY: MORE THAN HALF THE DAYS
1. LITTLE INTEREST OR PLEASURE IN DOING THINGS: SEVERAL DAYS
9. THOUGHTS THAT YOU WOULD BE BETTER OFF DEAD, OR OF HURTING YOURSELF: NOT AT ALL
3. TROUBLE FALLING OR STAYING ASLEEP OR SLEEPING TOO MUCH: SEVERAL DAYS
5. POOR APPETITE OR OVEREATING: MORE THAN HALF THE DAYS
SUM OF ALL RESPONSES TO PHQ QUESTIONS 1-9: 8

## 2024-03-07 ASSESSMENT — ANXIETY QUESTIONNAIRES
4. TROUBLE RELAXING: NOT AT ALL
7. FEELING AFRAID AS IF SOMETHING AWFUL MIGHT HAPPEN: NOT AT ALL
3. WORRYING TOO MUCH ABOUT DIFFERENT THINGS: SEVERAL DAYS
1. FEELING NERVOUS, ANXIOUS, OR ON EDGE: SEVERAL DAYS
IF YOU CHECKED OFF ANY PROBLEMS ON THIS QUESTIONNAIRE, HOW DIFFICULT HAVE THESE PROBLEMS MADE IT FOR YOU TO DO YOUR WORK, TAKE CARE OF THINGS AT HOME, OR GET ALONG WITH OTHER PEOPLE: SOMEWHAT DIFFICULT
GAD7 TOTAL SCORE: 3
5. BEING SO RESTLESS THAT IT IS HARD TO SIT STILL: NOT AT ALL
6. BECOMING EASILY ANNOYED OR IRRITABLE: NOT AT ALL
2. NOT BEING ABLE TO STOP OR CONTROL WORRYING: SEVERAL DAYS

## 2024-03-07 ASSESSMENT — PAIN SCALES - GENERAL: PAINLEVEL: 0-NO PAIN

## 2024-03-07 NOTE — PROGRESS NOTES
Subjective   Patient ID: Charlotte Bland is a 53 y.o. female who presents for cpe.    HPI   The patient presents to the clinic for an annual physical exam. She has past medical history of hyperlipidemia, heart palpitations, multinodular goiter, hyperparathyroidism (s/p parathyroidectomy), hypercalcemia, menstrual disorder, anxiety, depression, and osteoarthritis (left knee).    The patient had labs done on 03/06/2024 and requests for them to be reviewed. She reports that her diet has not been healthy recently. She has not been active recently.    The patient reports pain in her left hand, prior onset several weeks ago. She believes that the pain could be secondary to prolongued use of her smartphone. She has been attempting to switch hands frequently during phone use to alleviate pain symptoms. She also reports that she has been using OTC Icy Hot to alleviate her symptoms.    She reports that her family friend passed away recently (due to cardiac arrest). She states that the family friend was similar in age to her. Her family continues to grieve with the loss. She is also concerned about her own health following the passing of the family friend. She also reports a general increase in stress (secondary to various events including upcoming elections). She continues on sertraline 50 mg for treatment of depression/anxiety symptoms. She states that this medication is controlling her symptoms fairly well and she denies any side-effects.    She continues on phentermine 37.5 mg medication for weight loss under the guidance of a specialist (Dr. Vicente Tapia).    The patient recalls that she had a parathyroidectomy procedure in May 2023. She notes that her last menstrual period was after this procedure (perimenopausal). She states May of this year will be one full year w/o menstrual period    She reports family history of CAD with her father and grandmother.    She is scheduled for a repeat colonoscopy screening in June 2024.  "She is up-to-date on mammogram screening (02/07/2024) and gynecological exam (12/07/2023). Her most recent DEXA bone density scan was done on 10/18/2021.    Review of Systems    Objective   /77   Pulse 77   Temp 36.3 °C (97.3 °F)   Resp 16   Ht 1.676 m (5' 6\")   Wt 81.6 kg (180 lb)   LMP  (LMP Unknown)   SpO2 94%   BMI 29.05 kg/m²     Physical Exam  Constitutional:       Appearance: Normal appearance.   HENT:      Head: Normocephalic.      Right Ear: Tympanic membrane normal.      Left Ear: Tympanic membrane normal.      Mouth/Throat:      Pharynx: Oropharynx is clear.   Neck:      Comments: Well healed scar anterior neck  Cardiovascular:      Rate and Rhythm: Normal rate and regular rhythm.      Pulses: Normal pulses.      Heart sounds: Normal heart sounds.   Pulmonary:      Effort: Pulmonary effort is normal.      Breath sounds: Normal breath sounds.   Abdominal:      General: Bowel sounds are normal.      Palpations: Abdomen is soft. There is no mass.      Tenderness: There is no abdominal tenderness.   Musculoskeletal:         General: Normal range of motion.      Cervical back: Normal range of motion and neck supple.      Right lower leg: No edema.      Left lower leg: No edema.   Lymphadenopathy:      Cervical: No cervical adenopathy.   Skin:     General: Skin is warm and dry.   Neurological:      General: No focal deficit present.      Mental Status: She is alert and oriented to person, place, and time.   Psychiatric:         Mood and Affect: Mood normal.         Thought Content: Thought content normal.         Judgment: Judgment normal.         Assessment/Plan   Problem List Items Addressed This Visit             ICD-10-CM    Anxiety F41.9    Relevant Medications    sertraline (Zoloft) 50 mg tablet    Depression F32.A    Primary hyperparathyroidism (CMS/HCC) E21.0     Pt had surgery to remove May 2023.  Labs normal         FH: CAD (coronary artery disease) Z82.49    Relevant Orders    CT " cardiac scoring wo IV contrast     Other Visit Diagnoses         Codes    Physical exam    -  Primary Z00.00    Hand pain, left     M79.642               The patient's labs (03/06/2024) were reviewed. Blood count was mostly WNL, but MCHC (31.9) was slightly below normal range. Chemistries were WNL with FBS of 87. Kidney function markers and liver enzymes were WNL. Cholesterol results were mostly WNL (total cholesterol 171, HDL 31.9, LDL 95), but VLDL (44) and triglycerides (222) were elevated.  She was advised to incorporate aerobic exercise into her lifestyle. She was also advised to avoid bad processed fats in the diet. Consequently, a CT cardiac scoring study was ordered for the patient for further evaluation. The clinic will contact the patient upon receiving her study results.    The patient received a refill for her sertraline 50 mg prescription. She was instructed to continue taking sertraline 50 mg as previously directed.    In regards to concerns of left hand pain (secondary to phone use), differential diagnoses (arthritis, tendonitis, etc) were discussed with the patient. Following discussion, she was advised to try OTC Voltaren gel to alleviate her symptoms (assuming her condition is related to tendonitis). She can also try wearing a wrist splint at night. If her symptoms continue to persist, she was advised to contact the clinic for x-ray orders. Continue monitoring symptoms for improvement/exacerbation.     Discussed deression and anxiety and grief rxn in detail.  Discussed counseling may be the most helpful for her at this point.  Discussed medication increase and with this just happening suggested staying on same dose and then can discuss again in 1-2 months and see how pt is doing.  She is in agreement with this    A comprehensive physical exam was conducted on the patient.     She will follow-up in 2 months and 6 months, unless otherwise needed.    Scribe Attestation  By signing my name below, I,  Paola Tyler   attest that this documentation has been prepared under the direction and in the presence of Karlene Sandoval DO.

## 2024-03-08 RX ORDER — SERTRALINE HYDROCHLORIDE 50 MG/1
50 TABLET, FILM COATED ORAL DAILY
Qty: 90 TABLET | Refills: 1 | OUTPATIENT
Start: 2024-03-08

## 2024-03-14 ENCOUNTER — TELEPHONE (OUTPATIENT)
Dept: ENDOCRINOLOGY | Facility: CLINIC | Age: 54
End: 2024-03-14
Payer: COMMERCIAL

## 2024-03-14 DIAGNOSIS — E21.0 PRIMARY HYPERPARATHYROIDISM (MULTI): ICD-10-CM

## 2024-03-14 NOTE — TELEPHONE ENCOUNTER
Patient requesting lab orders prior to appointment. Orders pended to provider. Alisson Chanel LPN

## 2024-03-20 ENCOUNTER — OFFICE VISIT (OUTPATIENT)
Dept: ORTHOPEDIC SURGERY | Facility: HOSPITAL | Age: 54
End: 2024-03-20
Payer: COMMERCIAL

## 2024-03-20 VITALS — WEIGHT: 178 LBS | BODY MASS INDEX: 28.61 KG/M2 | HEIGHT: 66 IN

## 2024-03-20 DIAGNOSIS — M17.0 PRIMARY OSTEOARTHRITIS OF BOTH KNEES: Primary | ICD-10-CM

## 2024-03-20 DIAGNOSIS — M19.049 CMC ARTHRITIS: ICD-10-CM

## 2024-03-20 PROCEDURE — 99213 OFFICE O/P EST LOW 20 MIN: CPT | Performed by: PHYSICIAN ASSISTANT

## 2024-03-20 PROCEDURE — 20610 DRAIN/INJ JOINT/BURSA W/O US: CPT | Performed by: PHYSICIAN ASSISTANT

## 2024-03-20 PROCEDURE — 20600 DRAIN/INJ JOINT/BURSA W/O US: CPT | Performed by: PHYSICIAN ASSISTANT

## 2024-03-20 PROCEDURE — 1036F TOBACCO NON-USER: CPT | Performed by: PHYSICIAN ASSISTANT

## 2024-03-20 PROCEDURE — 2500000005 HC RX 250 GENERAL PHARMACY W/O HCPCS: Performed by: PHYSICIAN ASSISTANT

## 2024-03-20 PROCEDURE — 2500000004 HC RX 250 GENERAL PHARMACY W/ HCPCS (ALT 636 FOR OP/ED): Performed by: PHYSICIAN ASSISTANT

## 2024-03-20 PROCEDURE — 3008F BODY MASS INDEX DOCD: CPT | Performed by: PHYSICIAN ASSISTANT

## 2024-03-20 RX ORDER — LIDOCAINE HYDROCHLORIDE 10 MG/ML
0.5 INJECTION INFILTRATION; PERINEURAL
Status: COMPLETED | OUTPATIENT
Start: 2024-03-20 | End: 2024-03-20

## 2024-03-20 RX ORDER — TRIAMCINOLONE ACETONIDE 40 MG/ML
40 INJECTION, SUSPENSION INTRA-ARTICULAR; INTRAMUSCULAR
Status: COMPLETED | OUTPATIENT
Start: 2024-03-20 | End: 2024-03-20

## 2024-03-20 RX ORDER — TRIAMCINOLONE ACETONIDE 40 MG/ML
20 INJECTION, SUSPENSION INTRA-ARTICULAR; INTRAMUSCULAR
Status: COMPLETED | OUTPATIENT
Start: 2024-03-20 | End: 2024-03-20

## 2024-03-20 RX ORDER — LIDOCAINE HYDROCHLORIDE 10 MG/ML
4 INJECTION INFILTRATION; PERINEURAL
Status: COMPLETED | OUTPATIENT
Start: 2024-03-20 | End: 2024-03-20

## 2024-03-20 RX ADMIN — LIDOCAINE HYDROCHLORIDE 0.5 ML: 10 INJECTION, SOLUTION INFILTRATION; PERINEURAL at 10:59

## 2024-03-20 RX ADMIN — LIDOCAINE HYDROCHLORIDE 4 ML: 10 INJECTION, SOLUTION INFILTRATION; PERINEURAL at 10:59

## 2024-03-20 RX ADMIN — TRIAMCINOLONE ACETONIDE 20 MG: 400 INJECTION, SUSPENSION INTRA-ARTICULAR; INTRAMUSCULAR at 10:59

## 2024-03-20 RX ADMIN — TRIAMCINOLONE ACETONIDE 40 MG: 400 INJECTION, SUSPENSION INTRA-ARTICULAR; INTRAMUSCULAR at 10:59

## 2024-03-20 ASSESSMENT — PAIN SCALES - GENERAL: PAINLEVEL_OUTOF10: 5 - MODERATE PAIN

## 2024-03-20 ASSESSMENT — PAIN DESCRIPTION - DESCRIPTORS: DESCRIPTORS: ACHING;TIRING

## 2024-03-20 ASSESSMENT — PAIN - FUNCTIONAL ASSESSMENT: PAIN_FUNCTIONAL_ASSESSMENT: 0-10

## 2024-03-20 NOTE — PROGRESS NOTES
Alisa returns to clinic today for recurrent bilateral knee pain. She has a history of bilateral knee osteoarthritis and has done well with previous steroid injections. She is requesting repeat steroid injections today.  She also has been experiencing left hand thumb pain for the past month.  She was advised to use bracing ice and topical Voltaren gel which she has been doing however has not noticed much improvement with this.  No known injury or trauma denies any clicking or triggering.  Past medical history, medications, allergies, surgical history and review of systems have been reviewed with the patient. Pertinent changes are documented in the HPI. Otherwise they are unchanged when compared to last visit      Physical Examination Findings:  Constitutional: Appears well-developed and well-nourished.  Head: Normocephalic and atraumatic.  Eyes: Pupils are equal and round.  Cardiovascular: Intact distal pulses.   Respiratory: Effort normal. No respiratory distress.  Neurologic: Alert and oriented to person, place, and time.  Skin: Skin is warm and dry.  Hematologic / Lymphatic: No lymphedema, lymphangitis.  Psychiatric: normal mood and affect. Behavior is normal.   Musculoskeletal: Bilateral knees without effusion. Medial joint line tenderness bilaterally. Range of motion 0 to 120 degrees bilaterally.  Left hand mild swelling at the base of the left thumb no tenderness to palpation over the first dorsal compartment significant tenderness palpation over the left thumb CMC joint with positive CMC grind test     Impression: Bilateral knee osteoarthritis, left thumb CMC joint arthritis     Plan: Repeat steroid injections were performed today and tolerated well. She will continue to monitor symptoms and return to our office as needed.  In regards to her left thumb we have discussed the diagnosis and treatment options she has failed conservative management and has elected to proceed with steroid injection steroid  "injection was performed today and tolerated well.  Return to our office as needed.    Patient ID: Alisa Bland \"David" is a 53 y.o. female.    L Inj/Asp: bilateral knee on 3/20/2024 10:59 AM  Indications: pain  Details: 22 G needle, lateral approach  Medications (Right): 4 mL lidocaine 10 mg/mL (1 %); 40 mg triamcinolone acetonide 40 mg/mL  Medications (Left): 4 mL lidocaine 10 mg/mL (1 %); 40 mg triamcinolone acetonide 40 mg/mL  Procedure, treatment alternatives, risks and benefits explained, specific risks discussed. Consent was given by the patient. Immediately prior to procedure a time out was called to verify the correct patient, procedure, equipment, support staff and site/side marked as required.       S Inj/Asp: L thumb CMC on 3/20/2024 10:59 AM  Indications: pain  Details: 25 G needle, dorsal approach  Medications: 20 mg triamcinolone acetonide 40 mg/mL; 0.5 mL lidocaine 10 mg/mL (1 %)  Procedure, treatment alternatives, risks and benefits explained, specific risks discussed. Immediately prior to procedure a time out was called to verify the correct patient, procedure, equipment, support staff and site/side marked as required.         Huma Franks PA-C  Department of Orthopaedic Surgery  [facility]     Dictation performed with the use of voice recognition software. Syntax and grammatical errors may exist.   "

## 2024-04-04 ENCOUNTER — APPOINTMENT (OUTPATIENT)
Dept: ORTHOPEDIC SURGERY | Facility: CLINIC | Age: 54
End: 2024-04-04
Payer: COMMERCIAL

## 2024-04-08 DIAGNOSIS — E66.3 OVERWEIGHT (BMI 25.0-29.9): ICD-10-CM

## 2024-04-08 NOTE — TELEPHONE ENCOUNTER
----- Message from Alisa Bland sent at 4/6/2024  8:02 AM EDT -----  Regarding: RX   Contact: [phone]  I have a meeting/appointment on the 17th but I will run out of Phentermine before that. Is there any way to send a prescription to [facility] for 7 pills until I see you in person?    Thanks,  Charlotte Bland

## 2024-04-08 NOTE — TELEPHONE ENCOUNTER
LOV: 1/10/24  NOV: 4/17/24  Patient will run out of medication before NOV  Phentermine 30 days pended

## 2024-04-09 DIAGNOSIS — E66.3 OVERWEIGHT (BMI 25.0-29.9): ICD-10-CM

## 2024-04-09 RX ORDER — PHENTERMINE HYDROCHLORIDE 37.5 MG/1
TABLET ORAL
Qty: 30 TABLET | Refills: 0 | Status: SHIPPED | OUTPATIENT
Start: 2024-04-09 | End: 2024-05-14 | Stop reason: SDUPTHER

## 2024-04-09 RX ORDER — PHENTERMINE HYDROCHLORIDE 37.5 MG/1
TABLET ORAL
Qty: 30 TABLET | Refills: 0 | Status: SHIPPED | OUTPATIENT
Start: 2024-04-09 | End: 2024-04-09 | Stop reason: SDUPTHER

## 2024-04-16 NOTE — PROGRESS NOTES
History of Present Illness  Ms. WATERS is a 53 year year old female with a history of primary HPT, hypercalcemia, meniscus tear, knee arthritis (s/p cortisol shots), who presents for medical weight management.     She sees Peter Roy for her hypercalcemia / primary HPT.       1. Nutrition: Has been off track with eating since losing friend. Eating more comfort foods.      2. Sleep: stable       3. Stress: friend passed away from heart attack, dealing with grief and depression      4. Exercise:  Doing yoga and stretching      5. Appetite control: controlled   AOM currently taking: Phentermine      6. Goal: peloton twice a week       Current Outpatient Medications:     phentermine (Adipex-P) 37.5 mg tablet, TAKE 1 TABLET BY MOUTH ONCE DAILY., BMI 29 *NOT COVERED, Disp: 30 tablet, Rfl: 0    sertraline (Zoloft) 50 mg tablet, Take 1 tablet (50 mg) by mouth once daily., Disp: 90 tablet, Rfl: 1    ROS:  System: normal  Eyes : no visual changes  Neck : no tenderness, no new lumps/bumps  Respiratory : no SOB  Cardiovascular : no chest pain, no palpitations  Gastro-Intestinal : no abdominal concerns  Neurological : no numbness or tingling in the extremities  Musculoskeletal : no joint paint, no muscle pain  Skin : no unusual rashes  Psychiatric : no depression, no anxiety  See HPI for Endocrine ROS    Past Medical History:   Diagnosis Date    Personal history of other specified conditions 08/17/2018    History of fatigue    Personal history of other specified conditions 01/29/2016    History of weight gain       Past Surgical History:   Procedure Laterality Date    BREAST BIOPSY Right     Hx Right benign core biopsy    BREAST SURGERY  02/24/2015    Breast Surgery       Social History     Socioeconomic History    Marital status:      Spouse name: Not on file    Number of children: Not on file    Years of education: Not on file    Highest education level: Not on file   Occupational History    Not on file   Tobacco Use     Smoking status: Never    Smokeless tobacco: Never   Substance and Sexual Activity    Alcohol use: Yes     Comment: OCC    Drug use: Not Currently    Sexual activity: Not on file   Other Topics Concern    Not on file   Social History Narrative    Not on file     Social Determinants of Health     Financial Resource Strain: Not on file   Food Insecurity: Not on file   Transportation Needs: Not on file   Physical Activity: Not on file   Stress: Not on file   Social Connections: Not on file   Intimate Partner Violence: Not on file   Housing Stability: Not on file       Objective    Physical Exam:  There were no vitals taken for this visit.  General : alert and oriented X3, no acute distress  Eyes : EOMI     Provider Impressions  Ms. WATERS is a 53 year year old female with a history of primary HPT, hypercalcemia, meniscus tear, knee arthritis (s/p cortisol shots), who presents for medical weight management.     She sees Peter Roy for her hypercalcemia / primary HPT.     1. Weight Management : Reviewed the principles of energy metabolism, caloric intake and expenditure, and rationale for a treatment program. Also reinforced need for reduced calorie, low fat diet and increased physical activity.     2. Nutrition :   22 : 184lb, 29.72kg/m2  22: 178.2lb, 28.75kg/m2  22: 171.8lb, 27.68kg/m2  22: 169lb, 27.28kg/m2  22 : 166lb, 26.83kg/m2  10/25/22 : 168lb, 27kg/m2  22 : 169lb, 27.88kg/m2  23 : 170lb, 27.44kg/m2  23 : 181lb, 29.21kg/m2  23: 177.7lb, 28.64kg/m2  1/10/24: 178.2lb, 28.76kg/m2  24: 181.5lb, 29.29kg/m2     3. Sleep : is fine.     4. Stress : her close friend  of an MI - and this has left her and her  very shaken.     5. Exercise : minimal now, besides doing more Yoga.  got knee steroid injections.     6. Appetite : Taking Phentermine half tablet a day. Helping with appetite and helping to make her feel more full. Feels that it has helped her stay in  control and fight cravings.   4/6/22 : took phentermine for 3 months.     Tried : Qsymia - but she had side effects of extreme fatigue - stopped.  Wrote for: Buproprion XL 150mg/d, and Naltrexone 50mg --NEVER TOOK.     --taking phentermine.     7. Goal : to start the Peloton 2x.wk, and to work on self care.     FU in about a month.  Revtanja Tapia MD

## 2024-04-17 ENCOUNTER — OFFICE VISIT (OUTPATIENT)
Dept: ENDOCRINOLOGY | Facility: CLINIC | Age: 54
End: 2024-04-17
Payer: COMMERCIAL

## 2024-04-17 VITALS
HEART RATE: 91 BPM | HEIGHT: 66 IN | SYSTOLIC BLOOD PRESSURE: 100 MMHG | BODY MASS INDEX: 29.17 KG/M2 | DIASTOLIC BLOOD PRESSURE: 65 MMHG | WEIGHT: 181.5 LBS | TEMPERATURE: 98.8 F

## 2024-04-17 DIAGNOSIS — E66.3 OVERWEIGHT: Primary | ICD-10-CM

## 2024-04-17 DIAGNOSIS — Z76.89 ENCOUNTER FOR WEIGHT MANAGEMENT: ICD-10-CM

## 2024-04-17 PROCEDURE — 99215 OFFICE O/P EST HI 40 MIN: CPT | Performed by: INTERNAL MEDICINE

## 2024-04-17 PROCEDURE — 3008F BODY MASS INDEX DOCD: CPT | Performed by: INTERNAL MEDICINE

## 2024-04-18 ENCOUNTER — LAB (OUTPATIENT)
Dept: LAB | Facility: LAB | Age: 54
End: 2024-04-18
Payer: COMMERCIAL

## 2024-04-18 DIAGNOSIS — E21.0 PRIMARY HYPERPARATHYROIDISM (MULTI): ICD-10-CM

## 2024-04-18 LAB
25(OH)D3 SERPL-MCNC: 52 NG/ML (ref 30–100)
PTH-INTACT SERPL-MCNC: 28.2 PG/ML (ref 18.5–88)
T4 FREE SERPL-MCNC: 1.15 NG/DL (ref 0.78–1.48)
TSH SERPL-ACNC: 2.78 MIU/L (ref 0.44–3.98)

## 2024-04-18 PROCEDURE — 82306 VITAMIN D 25 HYDROXY: CPT

## 2024-04-18 PROCEDURE — 36415 COLL VENOUS BLD VENIPUNCTURE: CPT

## 2024-04-18 PROCEDURE — 84439 ASSAY OF FREE THYROXINE: CPT

## 2024-04-18 PROCEDURE — 83970 ASSAY OF PARATHORMONE: CPT

## 2024-04-18 PROCEDURE — 84443 ASSAY THYROID STIM HORMONE: CPT

## 2024-05-06 NOTE — OP NOTE
PREOPERATIVE DIAGNOSIS:  1. Primary hyperparathyroidism.  2. Right thyroid nodule.    POSTOPERATIVE DIAGNOSIS:  1. Primary hyperparathyroidism.  2. Right thyroid nodule.    OPERATION/PROCEDURE:  1. Parathyroidectomy with intraoperative parathyroid hormone  monitoring.   2. Right thyroid lobectomy with isthmusectomy.    SURGEON:  Sen Arnold MD.    ASSISTANT(S):  Dr. Glo Baird.    ANESTHESIA:  General.    ESTIMATED BLOOD LOSS:  10 cc.    HISTORY OF PRESENT ILLNESS AND SURGICAL INDICATIONS:  The patient is a 52-year-old  woman referred by Dr. Kris Butcher.  The patient has evidence of a multinodular thyroid  gland with prior benign biopsies.  She also has primary  hyperparathyroidism with calcium now at 11.2.  Parathyroid hormone  level high at 90.  She is having increasing mental fogginess.  She is  only 52 years of age and she is very concerned about developing  osteopenia and osteoporosis.  Her preoperative ultrasound and  sestamibi scan were highly consistent with a right-sided parathyroid  adenoma.  She also had nodular disease on that side and recommended  that we at least do a right thyroid lobectomy for her.  She does have  nodular disease on the opposite side.  We did discuss the option of  doing a total thyroidectomy to avoid long-term monitoring of her  nodules.  However, she is reticent to commit to going automatically  on thyroid hormone with total thyroidectomy.  She does understand  there is still a chance she may need thyroid hormone replacement,  even with a right thyroid lobectomy, but felt more comfortable with  that procedure.  Therefore, risks and benefits of right thyroid  lobectomy and parathyroidectomy were discussed.  She agreed and  signed consent.     DESCRIPTION OF OPERATION:  On the operative date, she was brought to the operating room.  After  induction of anesthetic, she was prepped and draped in usual sterile  fashion.  She had a towel behind her  shoulders, neck gently extended.   She had SCDs on for DVT prophylaxis.  Our baseline parathyroid  hormone level in the preop area was 80.  We made a 6 cm cervical  collar incision, divided down through platysma, raised subplatysmal  flaps superior to the notch in the thyroid cartilage, inferiorly to  the sternal notch, and laterally over the sternocleidomastoid  muscles.  We  the strap muscles in the midline and retracted  right sternohyoid and right sternothyroid muscles out lateral to the  level of the carotid artery.  Middle thyroid vein was identified,  ligated, and divided with 3-0 silk ties.  Next, we identified the  cricothyroid space, went medial to lateral on superior pole vessels.  Then began working our way along some of the posterolateral aspect of  the thyroid and sitting on the backside of the thyroid about the  midportion as seen on ultrasound was an enlarged right superior  parathyroid adenoma.  We began dissecting around this  circumferentially.  It looked like it went partially down  intrathyroidal.  We continued freeing that with a combination of ties  and the LigaSure device as needed.  We ultimately got to an area  where it thinned out and we came across there and removed the right  superior parathyroid.  As we did so, there may have still been a very  tiny amount of parathyroid tissue still going down underneath.  Therefore, we continued mobilizing some of the inferior portion of  the thyroid, taking branches of the inferior thyroid vein.  We then  rolled more of the gland up onto the trachea from below.  We then  dissected out laterally, identified the inferior thyroid artery and  coursing underneath it in a standard location of the right recurrent  laryngeal nerve.  We stayed anterior medial to the nerve with all 3-0  silk ties, reflecting the nerve away.  The LigaSure was not used in  this area.  As we did roll the thyroid gland up, we did see small  amount of additional  parathyroid tissue coming up underneath the  thyroid.  Therefore, we finished dissecting circumferentially around  the rest of that and then removed that.  Prior to removal of the  gland, we did send a stimulated PTH from the right internal jugular  vein which went up quite high to 1898.  This indicated a very active  gland.  We then sent 5-, 10-, and 20-minute specimens of parathyroid  hormone due to the high spike in PTH.  These dropped to 80, 57, and  55 respectively, which were all normal and demonstrated evidence of  cure.  We then continued freeing the rest of the thyroid up off the  nerve with 2-0 and 3-0 silk ties.  We rolled everything up onto the  trachea.  There was a small area of pyramidal lobe coming up in the  midline which we divided across.  We then came to the plane of the  isthmus between the right and left lobes.  There was palpable nodular  disease in the left thyroid lobe which was consistent with our  preoperative physical and ultrasonographic exam.  The nodules did all  appear smooth, well bordered and contained.  Therefore, I used a  LigaSure device and divided across the isthmus giving us good  hemostasis.  We then went ahead and removed the right thyroid lobe  marking with a double-tailed suture on the right superior pole,  single-tailed suture on the isthmus, and sent that off to pathology.  We then also sent the right superior parathyroid gland off as well.  We then irrigated the neck out well and achieved hemostasis.  Recurrent nerve was intact along its course.  Overall, hemostasis was  good.  We did use some Jerrod powder for final hemostasis and then  closed straps and platysma with 3-0 Vicryl, skin with a 4-0 Monocryl,  0.5% Marcaine was used as local anesthetic and dry sterile dressings  were applied.  The patient tolerated the procedure well, transferred  to Recovery in stable condition.       Sen Arnold MD    DD:  05/08/2023 10:24:01 EST  DT:  05/08/2023 21:56:44  EST  DICTATION NUMBER:  [id number]  INTERNAL JOB NUMBER:  [id number]    CC:  MARIO Arnold MD        Electronic Signatures:  Sen Arnold) (Signed on 09-May-2023 13:16)   Authored  Unsigned, Draft (SYS GENERATED) (Entered on 08-May-2023 21:56)   Entered    Last Updated: 09-May-2023 13:16 by Sen Arnold)

## 2024-05-13 ENCOUNTER — TELEMEDICINE (OUTPATIENT)
Dept: ENDOCRINOLOGY | Facility: CLINIC | Age: 54
End: 2024-05-13
Payer: COMMERCIAL

## 2024-05-13 DIAGNOSIS — E04.2 MULTINODULAR GOITER: ICD-10-CM

## 2024-05-13 DIAGNOSIS — E21.0 PRIMARY HYPERPARATHYROIDISM (MULTI): Primary | ICD-10-CM

## 2024-05-13 PROCEDURE — 99214 OFFICE O/P EST MOD 30 MIN: CPT | Performed by: INTERNAL MEDICINE

## 2024-05-13 PROCEDURE — G2211 COMPLEX E/M VISIT ADD ON: HCPCS | Performed by: INTERNAL MEDICINE

## 2024-05-13 PROCEDURE — 1036F TOBACCO NON-USER: CPT | Performed by: INTERNAL MEDICINE

## 2024-05-13 PROCEDURE — 3008F BODY MASS INDEX DOCD: CPT | Performed by: INTERNAL MEDICINE

## 2024-05-13 NOTE — PROGRESS NOTES
"Consults    Reason For Consult  Thyroid nodules and hyperparathyroidism     History Of Present Illness  Alisa Bland \"David" is a 53 y.o. female presenting with thyroid nodules.     She recently lost a close friend to acute heart attack  S/p parathyroidectomy 5/2023   Originally : calcium 11.2      Last MNG  US 1/2023     previous history reviewed :  She has been followed by our team for hypercalcemia  She has been having hypercalcemia for the last 3 to 4 years  Her PTH was 103 at 1 time with an elevated calcium up to 11  Recently her calcium and parathyroid hormone levels has been within high normal range  Her 24-hour urine calcium was high normal  Her bone density that was done was normal as well  She has a history of goiter with multiple nodules  She underwent a fine-needle aspiration in 2021 that showed nonmalignant benign colloid nodule  She does have some snoring at night and needs to use a light pillow.        She never had kidney stones  no constipation  no hx of osteoporosis or fragility fractures     Fam hx of osteoporosis in grandmother  fam hx of kidney stones in sister     denies smoking or drinking  Any family history of thyroid cancer? no  Any personal history of radiation to your head/neck? no    Past Medical History  She has a past medical history of Hyperparathyroidism (Multi), Personal history of other specified conditions (08/17/2018), Personal history of other specified conditions (01/29/2016), and Thyroid nodule.    Surgical History  She has a past surgical history that includes Breast surgery (02/24/2015); Breast biopsy (Right); and Parathyroid gland surgery (05/22).     Social History  She reports that she has never smoked. She has never used smokeless tobacco. She reports current alcohol use of about 2.0 standard drinks of alcohol per week. She reports that she does not use drugs.    Family History  Family History   Problem Relation Name Age of Onset    Coronary artery disease Father      " Coronary artery disease Paternal Grandmother          Allergies  Patient has no known allergies.    Review of Systems    Past Medical History:   Diagnosis Date    Hyperparathyroidism (Multi)     Personal history of other specified conditions 08/17/2018    History of fatigue    Personal history of other specified conditions 01/29/2016    History of weight gain    Thyroid nodule        Past Surgical History:   Procedure Laterality Date    BREAST BIOPSY Right     Hx Right benign core biopsy    BREAST SURGERY  02/24/2015    Breast Surgery    PARATHYROID GLAND SURGERY  05/22       Social History     Socioeconomic History    Marital status:      Spouse name: Not on file    Number of children: Not on file    Years of education: Not on file    Highest education level: Not on file   Occupational History    Not on file   Tobacco Use    Smoking status: Never    Smokeless tobacco: Never   Substance and Sexual Activity    Alcohol use: Yes     Alcohol/week: 2.0 standard drinks of alcohol     Types: 2 Shots of liquor per week     Comment: OCC    Drug use: Never    Sexual activity: Yes     Partners: Male     Birth control/protection: Post-menopausal   Other Topics Concern    Not on file   Social History Narrative    Not on file     Social Determinants of Health     Financial Resource Strain: Not on file   Food Insecurity: Not on file   Transportation Needs: Not on file   Physical Activity: Not on file   Stress: Not on file   Social Connections: Not on file   Intimate Partner Violence: Not on file   Housing Stability: Not on file        Physical Exam     ROS, PMH, FH/SH, surgical history and allergies have been reviewed.    Last Recorded Vitals  There were no vitals taken for this visit.    Relevant Results         If you would like to pull in Medications, type .meds     If you would like to pull in Lab results for the last 24 hours, type .[id number]    If you would like to pull in specific Lab results, type .ll     If you  "would like to pull in Imaging results, type .imgrslt :99}  Lab Review  Lab Results   Component Value Date    BILITOT 0.4 03/06/2024    CALCIUM 9.5 03/06/2024    CO2 26 03/06/2024     03/06/2024    CREATININE 0.78 03/06/2024    GLUCOSE 87 03/06/2024    ALKPHOS 48 03/06/2024    K 4.7 03/06/2024    PROT 7.3 03/06/2024     03/06/2024    AST 15 03/06/2024    ALT 9 03/06/2024    BUN 21 03/06/2024    ANIONGAP 12 03/06/2024    MG 2.08 01/13/2022    PHOS 2.5 01/13/2022    ALBUMIN 3.9 03/06/2024    GFRF >90 04/24/2023     Lab Results   Component Value Date    TRIG 222 (H) 03/06/2024    CHOL 171 03/06/2024    LDLCALC 95 03/06/2024    HDL 31.9 03/06/2024     No results found for: \"HGBA1C\"  The 10-year ASCVD risk score (Shahram HUNT, et al., 2019) is: 1.5%    Values used to calculate the score:      Age: 53 years      Sex: Female      Is Non- : No      Diabetic: No      Tobacco smoker: No      Systolic Blood Pressure: 100 mmHg      Is BP treated: No      HDL Cholesterol: 31.9 mg/dL      Total Cholesterol: 171 mg/dL       Assessment/Plan   Problem List Items Addressed This Visit             ICD-10-CM    Multinodular goiter E04.2    Relevant Orders    US thyroid    Primary hyperparathyroidism (Multi) - Primary E21.0    Relevant Orders    US thyroid               · Primary hyperparathyroidism (252.01) (E21.0)- since parathyroidectomy, energy improved    · Multinodular goiter (241.1) (E04.2)     hypercalcemia due to primary hyperparathyroidism  Multinodular goiter with enlarged thyroid, with a benign biopsy  Difficulty losing weight sees weight management program          her calcium is now 11.2, and PTH elevated she is fatigue      previously one calcium 11 at 1 point, 24-hour urine calcium is high normal, and bone density is normal        I spent a total of 30+ minutes on the date of the service which included preparing to see the patient, face-to-face patient care, completing clinical " documentation, obtaining and / or reviewing separately obtained history, counseling and educating the patient/family/caregiver, ordering medications, tests, or procedures, communicating with other healthcare providers (not separately reported), independently interpreting results, not separately reported, and communicating results to the patient/family/caregiver, real-time telemedicine visit using audiovisual technology with patient's verbal consent.  Name and date of birth verified.        Peter Roy MD

## 2024-05-14 DIAGNOSIS — E66.3 OVERWEIGHT (BMI 25.0-29.9): ICD-10-CM

## 2024-05-14 RX ORDER — PHENTERMINE HYDROCHLORIDE 37.5 MG/1
TABLET ORAL
Qty: 30 TABLET | Refills: 2 | Status: SHIPPED | OUTPATIENT
Start: 2024-05-14 | End: 2024-05-16 | Stop reason: SDUPTHER

## 2024-05-14 NOTE — TELEPHONE ENCOUNTER
----- Message from Alisa Bland sent at 5/13/2024  2:17 PM EDT -----  Regarding: Refill  Contact: [phone]  Hello,    I need the refill of the Phentermine sent to [facility]. Renata had told me to contact when I needed the refill.    Thanks,  Charlotte Bland

## 2024-05-16 DIAGNOSIS — E66.3 OVERWEIGHT (BMI 25.0-29.9): ICD-10-CM

## 2024-05-17 NOTE — TELEPHONE ENCOUNTER
----- Message from Alisa Bland sent at 5/16/2024  8:05 PM EDT -----  Regarding: Refill  Contact: [phone]    Hello,     I need the doctor to send it to [facility] in [place]. Not mail order, since my insurance doesn’t cover it.    Thanks,  Charlotte Bland

## 2024-05-20 RX ORDER — PHENTERMINE HYDROCHLORIDE 37.5 MG/1
TABLET ORAL
Qty: 30 TABLET | Refills: 2 | Status: SHIPPED | OUTPATIENT
Start: 2024-05-20

## 2024-05-21 ENCOUNTER — HOSPITAL ENCOUNTER (OUTPATIENT)
Dept: RADIOLOGY | Facility: CLINIC | Age: 54
Discharge: HOME | End: 2024-05-21
Payer: COMMERCIAL

## 2024-05-21 DIAGNOSIS — Z82.49 FH: CAD (CORONARY ARTERY DISEASE): ICD-10-CM

## 2024-05-21 PROCEDURE — 75571 CT HRT W/O DYE W/CA TEST: CPT

## 2024-05-22 ENCOUNTER — TELEPHONE (OUTPATIENT)
Dept: PRIMARY CARE | Facility: CLINIC | Age: 54
End: 2024-05-22
Payer: COMMERCIAL

## 2024-05-22 NOTE — TELEPHONE ENCOUNTER
----- Message from Karlene Sandoval DO sent at 5/21/2024  3:55 PM EDT -----  Report to pt her coronary calcium score is zero

## 2024-05-28 ENCOUNTER — HOSPITAL ENCOUNTER (OUTPATIENT)
Dept: RADIOLOGY | Facility: CLINIC | Age: 54
Discharge: HOME | End: 2024-05-28
Payer: COMMERCIAL

## 2024-05-28 PROCEDURE — 76536 US EXAM OF HEAD AND NECK: CPT | Performed by: RADIOLOGY

## 2024-05-28 PROCEDURE — 76536 US EXAM OF HEAD AND NECK: CPT

## 2024-05-31 ENCOUNTER — TELEPHONE (OUTPATIENT)
Dept: ENDOCRINOLOGY | Facility: CLINIC | Age: 54
End: 2024-05-31
Payer: COMMERCIAL

## 2024-05-31 NOTE — TELEPHONE ENCOUNTER
Alisson, she had thyroid usltrasound, I dont see a scan in the system.  The ultrasound showed multiple nodules , same as before  She needs follow up in 1 year

## 2024-05-31 NOTE — TELEPHONE ENCOUNTER
Alisa Bland  Contacted office to see if someone called her concerning her tyroid scan , patient would like provider to review scan done 05/29/2024. Alisson Chanel LPN

## 2024-05-31 NOTE — TELEPHONE ENCOUNTER
Spoke to patient and conveyed providers message , she expressed understanding . Alisson Chanel LPN

## 2024-06-05 ENCOUNTER — ANESTHESIA EVENT (OUTPATIENT)
Dept: GASTROENTEROLOGY | Facility: HOSPITAL | Age: 54
End: 2024-06-05
Payer: COMMERCIAL

## 2024-06-06 ENCOUNTER — ANESTHESIA (OUTPATIENT)
Dept: GASTROENTEROLOGY | Facility: HOSPITAL | Age: 54
End: 2024-06-06
Payer: COMMERCIAL

## 2024-06-06 ENCOUNTER — HOSPITAL ENCOUNTER (OUTPATIENT)
Dept: GASTROENTEROLOGY | Facility: HOSPITAL | Age: 54
Setting detail: OUTPATIENT SURGERY
Discharge: HOME | End: 2024-06-06
Payer: COMMERCIAL

## 2024-06-06 VITALS
SYSTOLIC BLOOD PRESSURE: 102 MMHG | RESPIRATION RATE: 18 BRPM | DIASTOLIC BLOOD PRESSURE: 69 MMHG | TEMPERATURE: 96.8 F | HEART RATE: 68 BPM | OXYGEN SATURATION: 100 % | HEIGHT: 66 IN | WEIGHT: 174.38 LBS | BODY MASS INDEX: 28.03 KG/M2

## 2024-06-06 DIAGNOSIS — Z12.11 ENCOUNTER FOR SCREENING FOR MALIGNANT NEOPLASM OF COLON: ICD-10-CM

## 2024-06-06 PROCEDURE — 7100000009 HC PHASE TWO TIME - INITIAL BASE CHARGE

## 2024-06-06 PROCEDURE — 3700000002 HC GENERAL ANESTHESIA TIME - EACH INCREMENTAL 1 MINUTE

## 2024-06-06 PROCEDURE — 45378 DIAGNOSTIC COLONOSCOPY: CPT | Performed by: INTERNAL MEDICINE

## 2024-06-06 PROCEDURE — 2500000005 HC RX 250 GENERAL PHARMACY W/O HCPCS: Performed by: NURSE ANESTHETIST, CERTIFIED REGISTERED

## 2024-06-06 PROCEDURE — 3700000001 HC GENERAL ANESTHESIA TIME - INITIAL BASE CHARGE

## 2024-06-06 PROCEDURE — 7100000010 HC PHASE TWO TIME - EACH INCREMENTAL 1 MINUTE

## 2024-06-06 PROCEDURE — 2500000004 HC RX 250 GENERAL PHARMACY W/ HCPCS (ALT 636 FOR OP/ED): Performed by: NURSE ANESTHETIST, CERTIFIED REGISTERED

## 2024-06-06 RX ORDER — SODIUM CHLORIDE, SODIUM LACTATE, POTASSIUM CHLORIDE, CALCIUM CHLORIDE 600; 310; 30; 20 MG/100ML; MG/100ML; MG/100ML; MG/100ML
50 INJECTION, SOLUTION INTRAVENOUS CONTINUOUS
Status: DISCONTINUED | OUTPATIENT
Start: 2024-06-07 | End: 2024-06-07 | Stop reason: HOSPADM

## 2024-06-06 RX ORDER — LIDOCAINE HYDROCHLORIDE 20 MG/ML
INJECTION, SOLUTION INFILTRATION; PERINEURAL AS NEEDED
Status: DISCONTINUED | OUTPATIENT
Start: 2024-06-06 | End: 2024-06-06

## 2024-06-06 RX ORDER — PROPOFOL 10 MG/ML
INJECTION, EMULSION INTRAVENOUS AS NEEDED
Status: DISCONTINUED | OUTPATIENT
Start: 2024-06-06 | End: 2024-06-06

## 2024-06-06 RX ADMIN — LIDOCAINE HYDROCHLORIDE 5 ML: 20 INJECTION, SOLUTION INFILTRATION; PERINEURAL at 08:36

## 2024-06-06 RX ADMIN — PROPOFOL 20 MG: 10 INJECTION, EMULSION INTRAVENOUS at 08:46

## 2024-06-06 RX ADMIN — PROPOFOL 50 MG: 10 INJECTION, EMULSION INTRAVENOUS at 08:38

## 2024-06-06 RX ADMIN — PROPOFOL 50 MG: 10 INJECTION, EMULSION INTRAVENOUS at 08:40

## 2024-06-06 RX ADMIN — PROPOFOL 50 MG: 10 INJECTION, EMULSION INTRAVENOUS at 08:36

## 2024-06-06 RX ADMIN — PROPOFOL 50 MG: 10 INJECTION, EMULSION INTRAVENOUS at 08:42

## 2024-06-06 SDOH — HEALTH STABILITY: MENTAL HEALTH: CURRENT SMOKER: 0

## 2024-06-06 ASSESSMENT — PAIN - FUNCTIONAL ASSESSMENT
PAIN_FUNCTIONAL_ASSESSMENT: 0-10

## 2024-06-06 ASSESSMENT — COLUMBIA-SUICIDE SEVERITY RATING SCALE - C-SSRS
2. HAVE YOU ACTUALLY HAD ANY THOUGHTS OF KILLING YOURSELF?: NO
1. IN THE PAST MONTH, HAVE YOU WISHED YOU WERE DEAD OR WISHED YOU COULD GO TO SLEEP AND NOT WAKE UP?: NO
6. HAVE YOU EVER DONE ANYTHING, STARTED TO DO ANYTHING, OR PREPARED TO DO ANYTHING TO END YOUR LIFE?: NO

## 2024-06-06 ASSESSMENT — PAIN SCALES - GENERAL
PAINLEVEL_OUTOF10: 0 - NO PAIN

## 2024-06-06 NOTE — H&P
"History Of Present Illness  Alisa Bland \"David" is a 53 y.o. female presenting with screening.     Past Medical History  Past Medical History:   Diagnosis Date    Hyperparathyroidism (Multi)     Personal history of other specified conditions 08/17/2018    History of fatigue    Personal history of other specified conditions 01/29/2016    History of weight gain    Thyroid nodule      Surgical History  Past Surgical History:   Procedure Laterality Date    BREAST BIOPSY Right     Hx Right benign core biopsy    BREAST SURGERY  02/24/2015    Breast Surgery    PARATHYROID GLAND SURGERY  05/22     Social History  She reports that she has never smoked. She has never used smokeless tobacco. She reports current alcohol use of about 2.0 standard drinks of alcohol per week. She reports that she does not use drugs.    Family History  Family History   Problem Relation Name Age of Onset    Coronary artery disease Father      Coronary artery disease Paternal Grandmother          Allergies  No Known Allergies  Review of Systems     Physical Exam  Vitals reviewed.   Constitutional:       Appearance: Normal appearance.   Cardiovascular:      Rate and Rhythm: Normal rate and regular rhythm.   Pulmonary:      Effort: Pulmonary effort is normal.      Breath sounds: Normal breath sounds.   Neurological:      Mental Status: She is alert.          Last Recorded Vitals  Blood pressure 117/68, pulse 87, temperature 36.6 °C (97.9 °F), temperature source Temporal, resp. rate 16, height 1.676 m (5' 5.98\"), weight 79.1 kg (174 lb 6.1 oz), SpO2 95%.    Assessment/Plan   R/O neoplasm for colonoscopy     Misha Davenport MD  "

## 2024-06-06 NOTE — ANESTHESIA POSTPROCEDURE EVALUATION
"Patient: Alisa Bland \"Charlotte\"    Procedure Summary       Date: 06/06/24 Room / Location: [facility] [place]    Anesthesia Start: 0828 Anesthesia Stop: 0903    Procedure: COLONOSCOPY Diagnosis: Encounter for screening for malignant neoplasm of colon    Scheduled Providers: Misha Davenport MD; Theo Berry MD Responsible Provider: Theo Berry MD    Anesthesia Type: MAC ASA Status: 2            Anesthesia Type: MAC    Vitals Value Taken Time   /69 06/06/24 0932   Temp 36 °C (96.8 °F) 06/06/24 0901   Pulse 67 06/06/24 0933   Resp 18 06/06/24 0931   SpO2 100 % 06/06/24 0933   Vitals shown include unfiled device data.    Anesthesia Post Evaluation    Patient location during evaluation: PACU  Patient participation: complete - patient participated  Level of consciousness: awake  Pain management: adequate  Multimodal analgesia pain management approach  Airway patency: patent  Cardiovascular status: acceptable  Respiratory status: acceptable  Hydration status: acceptable  Postoperative Nausea and Vomiting: none  Comments: Will continue to assess PONV status.        There were no known notable events for this encounter.    "

## 2024-06-06 NOTE — Clinical Note
Patient is age 53 and did not complete a pregnancy test prior to procedure. Dr. Davenport is aware, okay to proceed without waiver per MD.

## 2024-06-06 NOTE — ANESTHESIA PREPROCEDURE EVALUATION
"Patient: Alisa Bland \"Charlotte\"    Procedure Information       Date/Time: 06/06/24 0800    Scheduled providers: Misha Davenport MD; Theo Berry MD    Procedure: COLONOSCOPY    Location: [facility]            Relevant Problems   Anesthesia (within normal limits)      Cardiac   (+) Hyperlipidemia      Neuro   (+) Anxiety   (+) Depression      /Renal   (+) Acute UTI      Endocrine   (+) Euthyroid goiter   (+) Multinodular goiter   (+) Primary hyperparathyroidism (Multi) (Surgery at [facility])      Musculoskeletal   (+) Primary osteoarthritis of both knees   (+) Primary osteoarthritis of left knee      ID   (+) Acute UTI      GYN   (-) Uterine cancer (Multi)       Clinical information reviewed:   Tobacco  Allergies  Meds   Med Hx  Surg Hx   Fam Hx  Soc Hx        NPO Detail:  NPO/Void Status  Carbohydrate Drink Given Prior to Surgery? : N  Date of Last Liquid: 06/06/24  Time of Last Liquid: 0300  Date of Last Solid: 06/04/24  Time of Last Solid: 1800  Last Intake Type: Clear fluids; GI prep (water/prep)  Time of Last Void: 0700         Physical Exam    Airway  Mallampati: III  TM distance: >3 FB  Neck ROM: full     Cardiovascular    Dental    Pulmonary    Abdominal            Anesthesia Plan    History of general anesthesia?: yes  History of complications of general anesthesia?: no    ASA 2     MAC     The patient is not a current smoker.    intravenous induction   Anesthetic plan and risks discussed with patient.    Plan discussed with CRNA.      "

## 2024-06-06 NOTE — DISCHARGE INSTRUCTIONS
Patient Instructions after a Colonoscopy      The anesthetics, sedatives or narcotics which were given to you today will be acting in your body for the next 24 hours, so you might feel a little sleepy or groggy.  This feeling should slowly wear off. Carefully read and follow the instructions.     You received sedation today:  - Do not drive or operate any machinery or power tools of any kind.   - No alcoholic beverages today, not even beer or wine.  - Do not make any important decisions or sign any legal documents.  - No over the counter medications that contain alcohol or that may cause drowsiness.  - Do not make any important decisions or sign any legal documents.    While it is common to experience mild to moderate abdominal distention, gas, or belching after your procedure, if any of these symptoms occur following discharge from the GI Lab or within one week of having your procedure, call the [facility] to be advised whether a visit to your nearest Urgent Care or Emergency Department is indicated.  Take this paper with you if you go.     - If you develop an allergic reaction to the medications that were given during your procedure such as difficulty breathing, rash, hives, severe nausea, vomiting or lightheadedness.  - If you experience chest pain, shortness of breath, severe abdominal pain, fevers and chills.  -If you develop signs and symptoms of bleeding such as blood in your spit, if your stools turn black, tarry, or bloody  - If you have not urinated within 8 hours following your procedure.  - If your IV site becomes painful, red, inflamed, or looks infected.    If you received a biopsy/polypectomy/sphincterotomy the following instructions apply below:    __ Do not use Aspirin containing products, non-steroidal medications or anti-coagulants for one week following your procedure. (Examples of these types of medications are: Advil, Arthrotec, Aleve, Coumadin, Ecotrin, Heparin, Ibuprofen,  Indocin, Motrin, Naprosyn, Nuprin, Plavix, Vioxx, and Voltarin, or their generic forms.  This list is not all-inclusive.  Check with your physician or pharmacist before resuming medications.)   __ Eat a soft diet today.  Avoid foods that are poorly digested for the next 24 hours.  These foods would include: nuts, beans, lettuce, red meats, and fried foods. Start with liquids and advance your diet as tolerated, gradually work up to eating solids.   __ Do not have a Barium Study or Enema for one week.    Your physician recommends the additional following instructions:    -You have a contact number available for emergencies. The signs and symptoms of potential delayed complications were discussed with you. You may return to normal activities tomorrow.  -Resume your previous diet.  -Continue your present medications.   -We are waiting for your pathology results.  -Your physician has recommended a repeat colonoscopy (date to be determined after pending pathology results are reviewed) for surveillance based on pathology results.  -The findings and recommendations have been discussed with you.  -The findings and recommendations were discussed with your family.  - Please see Medication Reconciliation Form for new medication/medications prescribed.       If you experience any problems or have any questions following discharge from the GI Lab, please call:    Misha Davenport MD  [phone]      Nurse Signature                                                                        Date___________________                                                                            Patient/Responsible Party Signature                                        Date___________________

## 2024-06-07 NOTE — ADDENDUM NOTE
Encounter addended by: Patience Andrews RN on: 6/7/2024 9:03 AM   Actions taken: Contacts section saved, Flowsheet accepted

## 2024-06-26 ENCOUNTER — OFFICE VISIT (OUTPATIENT)
Dept: ORTHOPEDIC SURGERY | Facility: HOSPITAL | Age: 54
End: 2024-06-26
Payer: COMMERCIAL

## 2024-06-26 VITALS — WEIGHT: 180 LBS | HEIGHT: 66 IN | BODY MASS INDEX: 28.93 KG/M2

## 2024-06-26 DIAGNOSIS — M17.0 PRIMARY OSTEOARTHRITIS OF BOTH KNEES: Primary | ICD-10-CM

## 2024-06-26 DIAGNOSIS — M19.049 CMC ARTHRITIS: ICD-10-CM

## 2024-06-26 PROCEDURE — 3008F BODY MASS INDEX DOCD: CPT | Performed by: PHYSICIAN ASSISTANT

## 2024-06-26 PROCEDURE — 2500000005 HC RX 250 GENERAL PHARMACY W/O HCPCS: Performed by: PHYSICIAN ASSISTANT

## 2024-06-26 PROCEDURE — 99212 OFFICE O/P EST SF 10 MIN: CPT | Performed by: PHYSICIAN ASSISTANT

## 2024-06-26 PROCEDURE — 1036F TOBACCO NON-USER: CPT | Performed by: PHYSICIAN ASSISTANT

## 2024-06-26 PROCEDURE — 20610 DRAIN/INJ JOINT/BURSA W/O US: CPT | Mod: 50 | Performed by: PHYSICIAN ASSISTANT

## 2024-06-26 PROCEDURE — 20600 DRAIN/INJ JOINT/BURSA W/O US: CPT | Mod: LT | Performed by: PHYSICIAN ASSISTANT

## 2024-06-26 PROCEDURE — 2500000004 HC RX 250 GENERAL PHARMACY W/ HCPCS (ALT 636 FOR OP/ED): Performed by: PHYSICIAN ASSISTANT

## 2024-06-26 RX ORDER — LIDOCAINE HYDROCHLORIDE 10 MG/ML
0.5 INJECTION INFILTRATION; PERINEURAL
Status: COMPLETED | OUTPATIENT
Start: 2024-06-26 | End: 2024-06-26

## 2024-06-26 RX ORDER — LIDOCAINE HYDROCHLORIDE 10 MG/ML
4 INJECTION INFILTRATION; PERINEURAL
Status: COMPLETED | OUTPATIENT
Start: 2024-06-26 | End: 2024-06-26

## 2024-06-26 RX ORDER — TRIAMCINOLONE ACETONIDE 40 MG/ML
40 INJECTION, SUSPENSION INTRA-ARTICULAR; INTRAMUSCULAR
Status: COMPLETED | OUTPATIENT
Start: 2024-06-26 | End: 2024-06-26

## 2024-06-26 RX ORDER — TRIAMCINOLONE ACETONIDE 40 MG/ML
20 INJECTION, SUSPENSION INTRA-ARTICULAR; INTRAMUSCULAR
Status: COMPLETED | OUTPATIENT
Start: 2024-06-26 | End: 2024-06-26

## 2024-06-26 ASSESSMENT — PAIN - FUNCTIONAL ASSESSMENT: PAIN_FUNCTIONAL_ASSESSMENT: 0-10

## 2024-06-26 ASSESSMENT — PAIN SCALES - GENERAL: PAINLEVEL_OUTOF10: 7

## 2024-06-26 NOTE — PROGRESS NOTES
Alisa returns to clinic today for recurrent bilateral knee pain. She has a history of bilateral knee osteoarthritis and has done well with previous steroid injections.  However her last set of injections she does not feel was significant and long-term relief for her.  She is interested in surgical intervention in the near future.  Also requesting repeat injection for left thumb CMC arthritis    Past medical history, medications, allergies, surgical history and review of systems have been reviewed with the patient. Pertinent changes are documented in the HPI. Otherwise they are unchanged when compared to last visit      Physical Examination Findings:  Constitutional: Appears well-developed and well-nourished.  Head: Normocephalic and atraumatic.  Eyes: Pupils are equal and round.  Cardiovascular: Intact distal pulses.   Respiratory: Effort normal. No respiratory distress.  Neurologic: Alert and oriented to person, place, and time.  Skin: Skin is warm and dry.  Hematologic / Lymphatic: No lymphedema, lymphangitis.  Psychiatric: normal mood and affect. Behavior is normal.   Musculoskeletal: Bilateral knees without effusion. Medial joint line tenderness bilaterally. Range of motion 0 to 120 degrees bilaterally.  Left hand mild swelling at the base of the left thumb no tenderness to palpation over the first dorsal compartment significant tenderness palpation over the left thumb CMC joint with positive CMC grind test     Impression: Bilateral knee osteoarthritis, left thumb CMC joint arthritis     Plan: Repeat steroid injections were performed today and tolerated well. She will continue to monitor symptoms and return to our office as needed.  Repeat steroid injection as well for her left thumb tolerated well.  We will get her set up with an appointment for one of our knee arthroplasty doctors to discuss further surgical intervention given she is having continued issues and this is affecting her daily life.  Patient ID:  "Alisa Calvin"David" is a 53 y.o. female.    L Inj/Asp: bilateral knee on 6/26/2024 1:30 PM  Indications: pain  Details: 22 G needle, lateral approach  Medications (Right): 4 mL lidocaine 10 mg/mL (1 %); 40 mg triamcinolone acetonide 40 mg/mL  Medications (Left): 4 mL lidocaine 10 mg/mL (1 %); 40 mg triamcinolone acetonide 40 mg/mL  Procedure, treatment alternatives, risks and benefits explained, specific risks discussed. Consent was given by the patient. Immediately prior to procedure a time out was called to verify the correct patient, procedure, equipment, support staff and site/side marked as required.       S Inj/Asp: L thumb CMC on 6/26/2024 1:30 PM  Indications: pain  Details: 25 G needle, dorsal approach  Medications: 20 mg triamcinolone acetonide 40 mg/mL; 0.5 mL lidocaine 10 mg/mL (1 %)  Procedure, treatment alternatives, risks and benefits explained, specific risks discussed. Immediately prior to procedure a time out was called to verify the correct patient, procedure, equipment, support staff and site/side marked as required.         Huma Franks PA-C  Department of Orthopaedic Surgery  [facility]     Dictation performed with the use of voice recognition software. Syntax and grammatical errors may exist.   "

## 2024-07-03 ENCOUNTER — APPOINTMENT (OUTPATIENT)
Dept: ENDOCRINOLOGY | Facility: CLINIC | Age: 54
End: 2024-07-03
Payer: COMMERCIAL

## 2024-07-08 ENCOUNTER — APPOINTMENT (OUTPATIENT)
Dept: ORTHOPEDIC SURGERY | Facility: CLINIC | Age: 54
End: 2024-07-08
Payer: COMMERCIAL

## 2024-08-07 ENCOUNTER — APPOINTMENT (OUTPATIENT)
Dept: ENDOCRINOLOGY | Facility: CLINIC | Age: 54
End: 2024-08-07
Payer: COMMERCIAL

## 2024-08-22 ENCOUNTER — HOSPITAL ENCOUNTER (OUTPATIENT)
Dept: RADIOLOGY | Facility: CLINIC | Age: 54
Discharge: HOME | End: 2024-08-22
Payer: COMMERCIAL

## 2024-08-22 ENCOUNTER — OFFICE VISIT (OUTPATIENT)
Dept: ORTHOPEDIC SURGERY | Facility: CLINIC | Age: 54
End: 2024-08-22
Payer: COMMERCIAL

## 2024-08-22 DIAGNOSIS — M17.12 PRIMARY OSTEOARTHRITIS OF LEFT KNEE: ICD-10-CM

## 2024-08-22 DIAGNOSIS — M17.0 PRIMARY OSTEOARTHRITIS OF BOTH KNEES: ICD-10-CM

## 2024-08-22 PROCEDURE — 1036F TOBACCO NON-USER: CPT | Performed by: ORTHOPAEDIC SURGERY

## 2024-08-22 PROCEDURE — 99213 OFFICE O/P EST LOW 20 MIN: CPT | Performed by: ORTHOPAEDIC SURGERY

## 2024-08-22 PROCEDURE — 73564 X-RAY EXAM KNEE 4 OR MORE: CPT | Mod: LT

## 2024-08-22 ASSESSMENT — PAIN - FUNCTIONAL ASSESSMENT: PAIN_FUNCTIONAL_ASSESSMENT: 0-10

## 2024-08-22 ASSESSMENT — PAIN SCALES - GENERAL: PAINLEVEL_OUTOF10: 3

## 2024-08-22 NOTE — PROGRESS NOTES
Is a 54-year-old female presents today for evaluation of left knee osteoarthritis.  She is failed extensive conservative treatment with cortisone injections, viscosupplementation, physical therapy, bracing and anti-inflammatories.  She is able to walking certain distance going downstairs.  She is never had previous surgery on the knee.  She is contemplating proceeding with total knee replacement.    Left knee:  AAOx3, NAD, walks with a moderate antalgic gait  Varus allignment  Range of motion lacks 10 degrees of full extension and flexes to 110 degrees  Stable to varus/valgus/anterior/posterior stress through out the range of motion  Slight laxity with varus stress  Diffuse medial  joint line tenderness to palpation  Moderate effusion  SILT in a eitan/saph/per/tib distribution  5/5 knee extension/df/pf/ehl  ½ dorsalis pedis and posterior tibial pulse  no popliteal lymphadenopathy  no other overlying lesions  mood: euthymic  Respirations non labored    Plain films were reviewed by myself in clinic today.  She has advanced osteoarthritis of her left knee with complete loss of her medial joint space, leg sclerosis and tricompartmental osteophytic change.    We discussed further conservative treatment versus surgery with her today in clinic.  It sounds like she would like to proceed with total knee arthroplasty.  She is exhausted significant conservative treatment.  She was given my office card and number to schedule surgery if she desires.  We would see her back ahead of time for a full surgical consult.  All of her questions were answered.    M17.12  [id number]  Attune  [facility] [place]  [facility]    This note was created using voice recognition software and was not corrected for typographical or grammatical errors.

## 2024-08-26 DIAGNOSIS — F41.9 ANXIETY: ICD-10-CM

## 2024-08-26 RX ORDER — SERTRALINE HYDROCHLORIDE 50 MG/1
50 TABLET, FILM COATED ORAL DAILY
Qty: 90 TABLET | Refills: 0 | Status: SHIPPED | OUTPATIENT
Start: 2024-08-26

## 2024-09-06 ENCOUNTER — APPOINTMENT (OUTPATIENT)
Dept: PRIMARY CARE | Facility: CLINIC | Age: 54
End: 2024-09-06
Payer: COMMERCIAL

## 2024-09-06 VITALS
SYSTOLIC BLOOD PRESSURE: 110 MMHG | HEART RATE: 77 BPM | BODY MASS INDEX: 29.09 KG/M2 | HEIGHT: 66 IN | DIASTOLIC BLOOD PRESSURE: 76 MMHG | OXYGEN SATURATION: 93 % | TEMPERATURE: 97.7 F | WEIGHT: 181 LBS | RESPIRATION RATE: 18 BRPM

## 2024-09-06 DIAGNOSIS — E89.2 STATUS POST PARATHYROIDECTOMY (CMS/HCC): ICD-10-CM

## 2024-09-06 DIAGNOSIS — E66.3 OVERWEIGHT (BMI 25.0-29.9): ICD-10-CM

## 2024-09-06 DIAGNOSIS — F41.9 ANXIETY: Primary | ICD-10-CM

## 2024-09-06 DIAGNOSIS — F32.4 MAJOR DEPRESSIVE DISORDER WITH SINGLE EPISODE, IN PARTIAL REMISSION (CMS-HCC): ICD-10-CM

## 2024-09-06 DIAGNOSIS — Z00.00 PHYSICAL EXAM: ICD-10-CM

## 2024-09-06 DIAGNOSIS — M17.0 PRIMARY OSTEOARTHRITIS OF BOTH KNEES: ICD-10-CM

## 2024-09-06 RX ORDER — SERTRALINE HYDROCHLORIDE 50 MG/1
50 TABLET, FILM COATED ORAL DAILY
Qty: 90 TABLET | Refills: 1 | Status: SHIPPED | OUTPATIENT
Start: 2024-09-06

## 2024-09-06 ASSESSMENT — PATIENT HEALTH QUESTIONNAIRE - PHQ9
2. FEELING DOWN, DEPRESSED OR HOPELESS: NOT AT ALL
SUM OF ALL RESPONSES TO PHQ9 QUESTIONS 1 AND 2: 0
1. LITTLE INTEREST OR PLEASURE IN DOING THINGS: NOT AT ALL

## 2024-09-06 ASSESSMENT — PAIN SCALES - GENERAL: PAINLEVEL: 8

## 2024-09-06 NOTE — PROGRESS NOTES
Subjective   Patient ID: Charlotte Bland is a 54 y.o. female who presents for 6 month f/up.    HPI   The patient presents to the clinic for a 6-month follow-up. She has past medical history of hyperlipidemia, heart palpitations, multinodular goiter, hyperparathyroidism (s/p parathyroidectomy), hypercalcemia, menstrual disorder, anxiety, depression, and osteoarthritis (left knee).    She continues on sertraline 50 mg daily for treatment of depression/anxiety symptoms. She states that her depression/anxiety have been controlled on sertraline medication. Her mood has been good on this medication. She denies any side-effects to her sertraline medication.  She is very happy with how she feels on med    She is also on phentermine 37.5 mg (0.5 tablet or 18.75 mg daily) for weight managment purposes (current weight: 181 lbs, up 1 lb from 06/26/2024). She has been having difficulty scheduling an appointment to consult with Dr. Vicente Tapia (endocrinologist prescribing this medication) so she decreased from 1 tablet of phentermine to 0.5 tablet of phentermine in the meantime. The patient hopes to lose more weight on this medication as she is scheduled for a left knee replacement (with Dr. Marques) in the near future. She has been suffering from bilateral knee pain for several months (related to osteoarthritis and weight gain). Notably, the patient is s/p parathyroidectomy and she wants to consult with her endocrinologist for continued monitoring (history of hyperparathyroidism).  She is having problems w setting up appts    Her blood pressure (110/76) was within normal range when checked in the clinic today. She denies any chest pain and/or SOB symptoms.    Her most recent gynecological exam was done in December 2023. Her most recent mammogram screening was done in February 2024. Her most recent colonoscopy screening was done in June 2024 (10-year recall). Her most recent DEXA bone density scan was done in October  "2021.    Review of Systems    Objective   /76   Pulse 77   Temp 36.5 °C (97.7 °F)   Resp 18   Ht 1.676 m (5' 6\")   Wt 82.1 kg (181 lb)   SpO2 93%   BMI 29.21 kg/m²     Physical Exam  Constitutional:       Appearance: Normal appearance.   Cardiovascular:      Rate and Rhythm: Normal rate and regular rhythm.      Pulses: Normal pulses.      Heart sounds: Normal heart sounds.   Pulmonary:      Effort: Pulmonary effort is normal.      Breath sounds: Normal breath sounds.   Musculoskeletal:         General: Normal range of motion.      Cervical back: Normal range of motion.      Right lower leg: No edema.      Left lower leg: No edema.   Skin:     General: Skin is warm and dry.   Neurological:      General: No focal deficit present.      Mental Status: She is alert and oriented to person, place, and time.   Psychiatric:         Mood and Affect: Mood normal.         Thought Content: Thought content normal.         Judgment: Judgment normal.         Assessment/Plan   Problem List Items Addressed This Visit             ICD-10-CM    Anxiety - Primary F41.9    Relevant Medications    sertraline (Zoloft) 50 mg tablet    Depression F32.A    Overweight (BMI 25.0-29.9) E66.3    Status post parathyroidectomy (CMS/[facility]) E89.2    Primary osteoarthritis of both knees M17.0     Other Visit Diagnoses         Codes    Physical exam     Z00.00    Relevant Orders    Comprehensive Metabolic Panel    Lipid Panel    CBC and Auto Differential               Labs (CBC, CMP, lipid panel) were ordered for the patient to complete before her follow-up visit / CPE in ~6 months.    Discussed making appts prior to leaving office for her appts especially w specialists so can get in eze timely manner and not lose time off medication etc    In regards to her depression/anxiety, the patient received a refill for her sertraline 50 mg prescription. She was instructed to continue taking sertraline medication as previously directed. She was " instructed to continue monitoring symptoms for further improvement/exacerbation.    She will follow-up in 6 months (for CPE and medication refills), unless otherwise needed.  Labs prior    Scribe Attestation  By signing my name below, I, Oliva Garcia , Scribe   attest that this documentation has been prepared under the direction and in the presence of Karlene Sandoval DO.

## 2024-09-11 ENCOUNTER — APPOINTMENT (OUTPATIENT)
Dept: ENDOCRINOLOGY | Facility: CLINIC | Age: 54
End: 2024-09-11
Payer: COMMERCIAL

## 2024-09-16 ENCOUNTER — HOSPITAL ENCOUNTER (OUTPATIENT)
Dept: RADIOLOGY | Facility: HOSPITAL | Age: 54
Discharge: HOME | End: 2024-09-16
Payer: COMMERCIAL

## 2024-09-16 ENCOUNTER — OFFICE VISIT (OUTPATIENT)
Dept: ORTHOPEDIC SURGERY | Facility: HOSPITAL | Age: 54
End: 2024-09-16
Payer: COMMERCIAL

## 2024-09-16 DIAGNOSIS — M17.11 PRIMARY OSTEOARTHRITIS OF RIGHT KNEE: Primary | ICD-10-CM

## 2024-09-16 DIAGNOSIS — M17.11 PRIMARY OSTEOARTHRITIS OF RIGHT KNEE: ICD-10-CM

## 2024-09-16 PROCEDURE — 99213 OFFICE O/P EST LOW 20 MIN: CPT | Performed by: SPECIALIST/TECHNOLOGIST

## 2024-09-16 PROCEDURE — 2500000005 HC RX 250 GENERAL PHARMACY W/O HCPCS: Performed by: SPECIALIST/TECHNOLOGIST

## 2024-09-16 PROCEDURE — 2500000004 HC RX 250 GENERAL PHARMACY W/ HCPCS (ALT 636 FOR OP/ED): Performed by: SPECIALIST/TECHNOLOGIST

## 2024-09-16 PROCEDURE — 73564 X-RAY EXAM KNEE 4 OR MORE: CPT | Mod: RT

## 2024-09-16 PROCEDURE — 73564 X-RAY EXAM KNEE 4 OR MORE: CPT | Mod: RIGHT SIDE | Performed by: RADIOLOGY

## 2024-09-16 PROCEDURE — 1036F TOBACCO NON-USER: CPT | Performed by: SPECIALIST/TECHNOLOGIST

## 2024-09-16 PROCEDURE — 20610 DRAIN/INJ JOINT/BURSA W/O US: CPT | Mod: 50 | Performed by: SPECIALIST/TECHNOLOGIST

## 2024-09-17 PROCEDURE — 20610 DRAIN/INJ JOINT/BURSA W/O US: CPT | Mod: 50 | Performed by: SPECIALIST/TECHNOLOGIST

## 2024-09-17 RX ORDER — TRIAMCINOLONE ACETONIDE 40 MG/ML
10 INJECTION, SUSPENSION INTRA-ARTICULAR; INTRAMUSCULAR
Status: COMPLETED | OUTPATIENT
Start: 2024-09-17 | End: 2024-09-17

## 2024-09-17 RX ORDER — LIDOCAINE HYDROCHLORIDE 10 MG/ML
4 INJECTION, SOLUTION INFILTRATION; PERINEURAL
Status: COMPLETED | OUTPATIENT
Start: 2024-09-17 | End: 2024-09-17

## 2024-09-17 NOTE — PROGRESS NOTES
"Chief Complaint: No chief complaint on file.       HPI:  Alisa Bland \"David" is a 54 y.o. female presenting today as an established patient but new to me.  She has been followed by my colleague Huma Franks PA-C who is currently away on leave.  She reports continued bilateral knee pain.  She was previously seen by Huma in 6/26/2024 where bilateral corticosteroid injections were provided for her.  She says that they have been giving her less and less relief.  She has previously seen Dr. Marques in discussions for a left total knee arthroplasty.  She is scheduled for February 2025 to have this procedure performed.  She denies any adverse events or issues since her last visit with Josey.  She continues to report difficulty with ambulation generalized achy throbby soreness over the medial aspect of her knee.  Denies numbness or tingling into the bilateral lower extremities.  She presents for continued treatment recommendations.    Objective     ROS:  Constitutional: No fever, no chills, not feeling tired, no recent weight gain and no recent weight loss  ENT: No nosebleeds  Cardiovascular: No chest pain  Respiratory: No shortness of breath and no cough  Gastrointestinal: No abdominal pain, no nausea, no diarrhea, and no vomiting  Musculoskeletal: Positive for bilateral knee pain left greater than right  Integumentary: No rashes and no skin lesions  Neurological: No headache  Psychiatric: No sleep disturbances no depression  Endocrine: No muscle weakness and no muscle cramps  Hematologic/lymphatic: No swelling glands and no tendency for easy bruising    Past Medical History:   Diagnosis Date    Hyperparathyroidism (Multi)     Personal history of other specified conditions 08/17/2018    History of fatigue    Personal history of other specified conditions 01/29/2016    History of weight gain    Thyroid nodule         Past Surgical History:   Procedure Laterality Date    BREAST BIOPSY Right     Hx Right benign core " biopsy    BREAST SURGERY  02/24/2015    Breast Surgery    PARATHYROID GLAND SURGERY  05/22        Social Connections: Not on file          Physical Exam:  General appearance: WN, WD female, in no acute distress  Skin: No rashes, lesions or wounds  Head: Normocephalic, no evidence of trauma  Eye: EOMI, conjunctiva clear, no discharge  ENT: Nares patent  Neck: No abnormal contour, tracheal midline  Chest/lungs: No respiratory distress, speaking in complete sentences  Musculoskeletal: RIGHT KNEE: Tender to palpation over the medial joint line.  Stable valgus and varus stress test at 0 and 30 degrees.  LEFT KNEE: Tender to palpation over the medial joint line.  Stable valgus and varus stress test at 0 to 30 degrees.  She is able to perform an isometric quadriceps contraction and straight leg raise without difficulty or lag bilaterally.  Trace effusion bilaterally.  Knee flexion to 120 degrees bilaterally.  Patellofemoral crepitus noted bilaterally, right greater than left.  No decreased ROM, muscle wasting, rigidity    Neurological: A&O x3, no focal deficits, intact bilateral LE  Psych: normal affect, mood, appearance      Image Results:  X-rays of the right knee taken on 9/16/2024 were reviewed with the patient in the office and showed no acute fractures or dislocations.  There is severe medial compartment osteoarthritic changes and moderate to severe patellofemoral changes.    X-rays of the left knee taken on 8/22/2024 reviewed with the patient in the office and show no acute fractures dislocations.  There is severe medial compartment and patellofemoral compartment osteoarthritic changes.    Assessment/Plan   Encounter Diagnoses:  Primary osteoarthritis of right knee    Orders Placed This Encounter    XR knee right 4+ views       The patient and I discussed her clinical presentation and physical exam findings consistent with bilateral knee osteoarthritis.  We discussed her conservative and surgical treatment options.   She is scheduled for a total knee replacement in February 2025 and is fully aware that she cannot have any corticosteroid injections in the left knee 3 months prior to this exam.  Today she is far enough out to repeat the corticosteroid injection.  We discussed that the need for a right knee replacement may be in the near future since the pain is similar to her left side.  We discussed the use of  viscosupplementation as an alternative for her right knee.  She had an on her left and said it did not provide her with much relief of her symptoms.  If she wishes to pursue viscosupplementation we will have her follow-up with one of her primary care sports medicine physicians to have a consultation.  Today, we agreed upon bilateral corticosteroid injections into the bilateral knees.  She tolerated this procedure well.  She should ice her knee 15 to 20 minutes at a time 2-3 times per day.  She can take two 500 mg extra strength Tylenol 3 times daily as needed for pain.  I am happy to see her back on an as-needed basis.  Her questions were answered.  She is in agreement the plan.    L Inj/Asp: bilateral knee on 9/17/2024 3:02 PM  Indications: pain  Details: 25 G needle, anteromedial approach  Medications (Right): 4 mL lidocaine 10 mg/mL (1 %); 10 mg triamcinolone acetonide 40 mg/mL  Medications (Left): 4 mL lidocaine 10 mg/mL (1 %); 10 mg triamcinolone acetonide 40 mg/mL  Outcome: tolerated well, no immediate complications  Procedure, treatment alternatives, risks and benefits explained, specific risks discussed. Consent was given by the patient. Immediately prior to procedure a time out was called to verify the correct patient, procedure, equipment, support staff and site/side marked as required. Patient was prepped and draped in the usual sterile fashion.           ** This office note was dictated using Dragon voice to text software and was not proofread for spelling or grammatical errors **

## 2024-11-18 NOTE — PROGRESS NOTES
"Jurgen Bland \"Charlotte\" is a 54 y.o. female with a history of primary HPT, hypercalcemia, meniscus tear, knee arthritis (s/p cortisol shots), who presents for medical weight management.    Current Plan  1. Nutrition: Mediterranean Diet     2. Sleep: Stable      3. Stress: Stable. Has knee surgery in [facility].      4. Exercise:  Goes to the gym twice a week- does weight circuit and rides bike        5. Appetite control: Stable  Obesity medication: Phentermine- 37.5mg     6. Prior Goals: Met     New Goals: Other- stay consistent with workouts    Weight trend:    Wt Readings from Last 10 Encounters:   09/06/24 82.1 kg (181 lb)   06/26/24 81.6 kg (180 lb)   06/06/24 79.1 kg (174 lb 6.1 oz)   04/17/24 82.3 kg (181 lb 8 oz)   03/20/24 80.7 kg (178 lb)   03/07/24 81.6 kg (180 lb)   02/07/24 81.6 kg (179 lb 14.3 oz)   01/10/24 80.8 kg (178 lb 3.2 oz)   12/07/23 81.6 kg (180 lb)   08/29/23 80.5 kg (177 lb 7 oz)         Current Outpatient Medications:     phentermine (Adipex-P) 37.5 mg tablet, TAKE 1 TABLET BY MOUTH ONCE DAILY., BMI 29.29kg/m2, Disp: 30 tablet, Rfl: 2    sertraline (Zoloft) 50 mg tablet, Take 1 tablet (50 mg) by mouth once daily., Disp: 90 tablet, Rfl: 1    ROS:  System: normal  Eyes : no visual changes  Neck : no tenderness, no new lumps/bumps  Respiratory : no SOB  Cardiovascular : no chest pain, no palpitations  Gastro-Intestinal : no abdominal concerns  Neurological : no numbness or tingling in the extremities  Musculoskeletal : no joint paint, no muscle pain  Skin : no unusual rashes  Psychiatric : no depression, no anxiety  See [facility] for Endocrine ROS    Past Medical History:   Diagnosis Date    Hyperparathyroidism (Multi)     Personal history of other specified conditions 08/17/2018    History of fatigue    Personal history of other specified conditions 01/29/2016    History of weight gain    Thyroid nodule        Past Surgical History:   Procedure Laterality Date    BREAST BIOPSY Right     Hx " Right benign core biopsy    BREAST SURGERY  02/24/2015    Breast Surgery    PARATHYROID GLAND SURGERY  05/22       Social History     Socioeconomic History    Marital status:      Spouse name: Not on file    Number of children: Not on file    Years of education: Not on file    Highest education level: Not on file   Occupational History    Not on file   Tobacco Use    Smoking status: Never    Smokeless tobacco: Never   Vaping Use    Vaping status: Not on file   Substance and Sexual Activity    Alcohol use: Yes     Alcohol/week: 2.0 standard drinks of alcohol     Types: 2 Shots of liquor per week     Comment: OCC    Drug use: Never    Sexual activity: Yes     Partners: Male     Birth control/protection: Post-menopausal   Other Topics Concern    Not on file   Social History Narrative    Not on file     Social Drivers of Health     Financial Resource Strain: Not on file   Food Insecurity: Not on file   Transportation Needs: Not on file   Physical Activity: Not on file   Stress: Not on file   Social Connections: Not on file   Intimate Partner Violence: Not on file   Housing Stability: Not on file     Objective    Physical Exam:  There were no vitals taken for this visit.  General : alert and oriented X3, no acute distress  Eyes : EOMI   BMI: 29.21kg/m2    Provider Impressions  Ms. WATERS is a 54 year year old female with a history of primary HPT, hypercalcemia, meniscus tear, knee arthritis (s/p cortisol shots), who presents for medical weight management.     She sees Peter Roy for her hypercalcemia / primary HPT.     1. Weight Management : Reviewed the principles of energy metabolism, caloric intake and expenditure, and rationale for a treatment program. Also reinforced need for reduced calorie, low fat diet and increased physical activity.     2. Nutrition :   4/6/22 : 184lb, 29.72kg/m2  5/2/22: 178.2lb, 28.75kg/m2  6/1/22: 171.8lb, 27.68kg/m2  8/23/22: 169lb, 27.28kg/m2  9/28/22 : 166lb,  26.83kg/m2  10/25/22 : 168lb, 27kg/m2  12/21/22 : 169lb, 27.88kg/m2  2/8/23 : 170lb, 27.44kg/m2  7/11/23 : 181lb, 29.21kg/m2  8/29/23: 177.7lb, 28.64kg/m2  1/10/24: 178.2lb, 28.76kg/m2  4/17/24: 181.5lb, 29.29kg/m2  11/19/24: 181lb, 29.21kg/m2     3. Sleep : is fine.     4. Stress : needs to have a knee replacement in 2/2025.     5. Exercise : is doing [employer] 2x/wk - doing the circuit of weights.     6. Appetite : Taking Phentermine half tablet a day. Helping with appetite and helping to make her feel more full. Feels that it has helped her stay in control and fight cravings.   4/6/22 : took phentermine for 3 months.     Tried : Qsymia - but she had side effects of extreme fatigue - stopped.  Wrote for: Buproprion XL 150mg/d, and Naltrexone 50mg --NEVER TOOK.     --taking phentermine. -- minimal benefit  --she wants to try semaglutide from [facility].     7. Goal : to meet with Eryn Robb, continue with the exercise.     FU in about a month.  Kris Tpaia MD

## 2024-11-19 ENCOUNTER — APPOINTMENT (OUTPATIENT)
Dept: ENDOCRINOLOGY | Facility: CLINIC | Age: 54
End: 2024-11-19
Payer: COMMERCIAL

## 2024-11-19 VITALS
DIASTOLIC BLOOD PRESSURE: 65 MMHG | SYSTOLIC BLOOD PRESSURE: 103 MMHG | BODY MASS INDEX: 29.09 KG/M2 | WEIGHT: 181 LBS | HEIGHT: 66 IN | HEART RATE: 72 BPM

## 2024-11-19 DIAGNOSIS — E66.3 OVERWEIGHT (BMI 25.0-29.9): Primary | ICD-10-CM

## 2024-11-19 DIAGNOSIS — Z76.89 ENCOUNTER FOR WEIGHT MANAGEMENT: ICD-10-CM

## 2024-11-19 PROCEDURE — 99215 OFFICE O/P EST HI 40 MIN: CPT | Performed by: INTERNAL MEDICINE

## 2024-11-19 PROCEDURE — 3008F BODY MASS INDEX DOCD: CPT | Performed by: INTERNAL MEDICINE

## 2024-12-16 PROBLEM — M17.12 UNILATERAL PRIMARY OSTEOARTHRITIS, LEFT KNEE: Status: ACTIVE | Noted: 2024-12-16

## 2025-01-02 ENCOUNTER — APPOINTMENT (OUTPATIENT)
Dept: ENDOCRINOLOGY | Facility: CLINIC | Age: 55
End: 2025-01-02
Payer: COMMERCIAL

## 2025-01-02 VITALS — WEIGHT: 175 LBS | HEIGHT: 66 IN | BODY MASS INDEX: 28.12 KG/M2

## 2025-01-02 DIAGNOSIS — E04.2 MULTINODULAR GOITER: ICD-10-CM

## 2025-01-02 DIAGNOSIS — E55.9 VITAMIN D DEFICIENCY: ICD-10-CM

## 2025-01-02 DIAGNOSIS — E21.0 PRIMARY HYPERPARATHYROIDISM (MULTI): ICD-10-CM

## 2025-01-02 PROCEDURE — 1036F TOBACCO NON-USER: CPT | Performed by: INTERNAL MEDICINE

## 2025-01-02 PROCEDURE — 3008F BODY MASS INDEX DOCD: CPT | Performed by: INTERNAL MEDICINE

## 2025-01-02 PROCEDURE — 99214 OFFICE O/P EST MOD 30 MIN: CPT | Performed by: INTERNAL MEDICINE

## 2025-01-02 ASSESSMENT — PATIENT HEALTH QUESTIONNAIRE - PHQ9
SUM OF ALL RESPONSES TO PHQ9 QUESTIONS 1 AND 2: 0
1. LITTLE INTEREST OR PLEASURE IN DOING THINGS: NOT AT ALL
2. FEELING DOWN, DEPRESSED OR HOPELESS: NOT AT ALL

## 2025-01-02 NOTE — PROGRESS NOTES
"Consults    Reason For Consult  Hyperprathyroidism     History Of Present Illness  Alisa Bland \"David" is a 54 y.o. female presenting with thyroid nodules and hypercalcemia .     Thyroid nodules and hyperparathyroidism    getting ready for her knee surgery 2/2025  On week 3 ozempic , more then adipex    She also has thyroid nodules.     S/p parathyroidectomy 5/2023   Originally : calcium 11.2        5/2024  FINDINGS:  PARENCHYMA:  Diffusely heterogenous parenchyma.      SIZE:  RIGHT LOBE:  Surgically absent  LEFT LOBE:  7.3 x 2.6 x 3 cm  ISTHMUS:  3 mm in AP diameter      NODULES: (Please note, assessment and description of nodules is per  TI-RADS criteria. Up to 4 total nodules described, which includes  largest and/or most clinically significant based on morphology.) It  is noted that some spongiform and/or cystic nodules may not be  specifically described and are TR category 1 (benign).      NODULE #: 1.      Location: Left thyroid lobe superior aspect  Size: 1.6 x 1.1 x 1.5 cm compared to 1.2 x 0.9 x 1.1 cm on prior  Composition:  Solid or almost completely solid (2)  Echogenicity:  Hyperechoic or isoechoic (1)  Shape:  Wider-than-tall (0)  Margin:  Smooth (0)  Echogenic Foci:  None or Large comet-tail artifacts (0)      If present previously:  Significant change in size (>/= 20% diameter increase in at least  two dimensions and minimal increase of 2mm?): Yes, increased. Change  in features or ACR TI-RADS category: No change.      The total score of this nodule is 3 points, corresponding to a  TI-RADS category  3; (3 points) Mildly suspicious.      NODULE #: 2.      Location: Left thyroid lobe mid aspect  Size: 1.7 x 1.4 x 1.8 cm compared to 1.7 x 1.2 x 1.4 cm on prior  Composition: mixed cystic and solid (1)  Echogenicity: Hyperechoic or isoechoic (1)  Shape:  Wider-than-tall (0)  Margin:  Smooth (0)  Echogenic Foci: None or Large comet-tail artifacts (0)      If present previously:  Significant change in " size (>/= 20% diameter increase in at least  two dimensions and minimal increase of 2mm?): No significant change.  Change in features or ACR TI-RADS category: No change.      The total score of this nodule is 2 points, corresponding to a  TI-RADS category 2; (2 points) not suspicious.      NODULE #: 3.      Location: Left thyroid lobe inferior aspect  Size: 2.4 x 1.7 x 2.2 cm compared to 2.3 x 2 x 2.3 cm on prior  Composition: Solid or almost completely solid (2)  Echogenicity: Hyperechoic or isoechoic (1)  Shape:  Wider-than-tall (0)  Margin:  Smooth (0)  Echogenic Foci: None or Large comet-tail artifacts (0)      If present previously:  Significant change in size (>/= 20% diameter increase in at least  two dimensions and minimal increase of 2mm?): Yes, increased. Change  in features or ACR TI-RADS category: No change.      The total score of this nodule is 3 points, corresponding to a  TI-RADS category 3; (3 points) Mildly suspicious.      NODULE #: 4.      Location: Left thyroid lobe inferior aspect  Size: 2.2 x 2 x 2.2 cm compared to 2.1 x 1.8 x 1.7 cm on prior  Composition: Solid or almost completely solid (2)  Echogenicity: Hyperechoic or isoechoic (1)  Shape:  Wider-than-tall (0)  Margin:  Smooth (0)  Echogenic Foci: None or Large comet-tail artifacts (0)      If present previously:  Significant change in size (>/= 20% diameter increase in at least  two dimensions and minimal increase of 2mm?): Yes, increased. Change  in features or ACR TI-RADS category: No change.      The total score of this nodule is 3 points, corresponding to a  TI-RADS category 3; (3 points) Mildly suspicious.      IMPRESSION:  1. Previous right thyroid lobectomy.  2. Enlarged heterogeneous left thyroid lobe containing multiple  nodules similar to prior most suggestive of multinodular goiter.  Continued surveillance may be considered.                  previous history reviewed :  She has been followed by our team for hypercalcemia  She  has been having hypercalcemia for the last 3 to 4 years  Her PTH was 103 at 1 time with an elevated calcium up to 11  Recently her calcium and parathyroid hormone levels has been within high normal range  Her 24-hour urine calcium was high normal  Her bone density that was done was normal as well  She has a history of goiter with multiple nodules  She underwent a fine-needle aspiration in 2021 that showed nonmalignant benign colloid nodule  She does have some snoring at night and needs to use a light pillow.        She never had kidney stones  no constipation  no hx of osteoporosis or fragility fractures     Fam hx of osteoporosis in grandmother  fam hx of kidney stones in sister     denies smoking or drinking    Past Medical History  She has a past medical history of Hyperparathyroidism (Multi), Personal history of other specified conditions (08/17/2018), Personal history of other specified conditions (01/29/2016), and Thyroid nodule.    Surgical History  She has a past surgical history that includes Breast surgery (02/24/2015); Breast biopsy (Right); and Parathyroid gland surgery (05/22).     Social History  She reports that she has never smoked. She has never used smokeless tobacco. She reports current alcohol use of about 2.0 standard drinks of alcohol per week. She reports that she does not use drugs.    Family History  Family History   Problem Relation Name Age of Onset    Coronary artery disease Father      Coronary artery disease Paternal Grandmother          Allergies  Patient has no known allergies.    Review of Systems    Past Medical History:   Diagnosis Date    Hyperparathyroidism (Multi)     Personal history of other specified conditions 08/17/2018    History of fatigue    Personal history of other specified conditions 01/29/2016    History of weight gain    Thyroid nodule        Past Surgical History:   Procedure Laterality Date    BREAST BIOPSY Right     Hx Right benign core biopsy    BREAST SURGERY   "02/24/2015    Breast Surgery    PARATHYROID GLAND SURGERY  05/22       Social History     Socioeconomic History    Marital status:      Spouse name: Not on file    Number of children: Not on file    Years of education: Not on file    Highest education level: Not on file   Occupational History    Not on file   Tobacco Use    Smoking status: Never    Smokeless tobacco: Never   Vaping Use    Vaping status: Not on file   Substance and Sexual Activity    Alcohol use: Yes     Alcohol/week: 2.0 standard drinks of alcohol     Types: 2 Shots of liquor per week     Comment: OCC    Drug use: Never    Sexual activity: Yes     Partners: Male     Birth control/protection: Post-menopausal   Other Topics Concern    Not on file   Social History Narrative    Not on file     Social Drivers of Health     Financial Resource Strain: Not on file   Food Insecurity: Not on file   Transportation Needs: Not on file   Physical Activity: Not on file   Stress: Not on file   Social Connections: Not on file   Intimate Partner Violence: Not on file   Housing Stability: Not on file        Physical Exam     ROS, PMH, FH/SH, surgical history and allergies have been reviewed.    Last Recorded Vitals  Height 1.676 m (5' 6\"), weight 79.4 kg (175 lb).    Relevant Results         If you would like to pull in Medications, type .meds     If you would like to pull in Lab results for the last 24 hours, type .[id number]    If you would like to pull in specific Lab results, type .ll     If you would like to pull in Imaging results, type .[id number] :99}  Lab Review  Lab Results   Component Value Date    BILITOT 0.4 03/06/2024    CALCIUM 9.5 03/06/2024    CO2 26 03/06/2024     03/06/2024    CREATININE 0.78 03/06/2024    GLUCOSE 87 03/06/2024    ALKPHOS 48 03/06/2024    K 4.7 03/06/2024    PROT 7.3 03/06/2024     03/06/2024    AST 15 03/06/2024    ALT 9 03/06/2024    BUN 21 03/06/2024    ANIONGAP 12 03/06/2024    MG 2.08 01/13/2022    PHOS 2.5 " "01/13/2022    ALBUMIN 3.9 03/06/2024    GFRF >90 04/24/2023     Lab Results   Component Value Date    TRIG 222 (H) 03/06/2024    CHOL 171 03/06/2024    LDLCALC 95 03/06/2024    HDL 31.9 03/06/2024     No results found for: \"HGBA1C\"  The 10-year ASCVD risk score (Shahram HUNT, et al., 2019) is: 1.7%    Values used to calculate the score:      Age: 54 years      Sex: Female      Is Non- : No      Diabetic: No      Tobacco smoker: No      Systolic Blood Pressure: 103 mmHg      Is BP treated: No      HDL Cholesterol: 31.9 mg/dL      Total Cholesterol: 171 mg/dL       Assessment/Plan   Problem List Items Addressed This Visit             ICD-10-CM    Multinodular goiter E04.2    Relevant Orders    US thyroid    TSH with reflex to Free T4 if abnormal    Comprehensive Metabolic Panel    Vitamin D 25-Hydroxy,Total (for eval of Vitamin D levels)    Parathyroid Hormone, Intact    CBC    Primary hyperparathyroidism (Multi) E21.0    Relevant Orders    US thyroid    TSH with reflex to Free T4 if abnormal    Comprehensive Metabolic Panel    Vitamin D 25-Hydroxy,Total (for eval of Vitamin D levels)    Parathyroid Hormone, Intact    CBC     Other Visit Diagnoses         Codes    BMI 29.0-29.9,adult    -  Primary Z68.29    Relevant Orders    US thyroid    TSH with reflex to Free T4 if abnormal    Comprehensive Metabolic Panel    Vitamin D 25-Hydroxy,Total (for eval of Vitamin D levels)    Parathyroid Hormone, Intact    CBC            She is having surgery  Suggest preop exercise to strengthen her muscle, working with wt management   We will repeat US thyroid to follow slight change in her thyroid nodule size noted 4/2024   Also labs ordered     Rtc in 1 year     I spent a total of 30+ minutes on the date of the service which included preparing to see the patient, face-to-face patient care, completing clinical documentation, obtaining and / or reviewing separately obtained history, counseling and educating the " patient/family/caregiver, ordering medications, tests, or procedures, communicating with other healthcare providers (not separately reported), independently interpreting results, not separately reported, and communicating results to the patient/family/caregiver, real-time telemedicine visit using audiovisual technology with patient's verbal consent.  Name and date of birth verified.         Peter Roy MD

## 2025-01-06 ENCOUNTER — APPOINTMENT (OUTPATIENT)
Dept: RADIOLOGY | Facility: HOSPITAL | Age: 55
End: 2025-01-06
Payer: COMMERCIAL

## 2025-01-07 ENCOUNTER — HOSPITAL ENCOUNTER (OUTPATIENT)
Dept: RADIOLOGY | Facility: HOSPITAL | Age: 55
Discharge: HOME | End: 2025-01-07
Payer: COMMERCIAL

## 2025-01-07 PROCEDURE — 76536 US EXAM OF HEAD AND NECK: CPT

## 2025-01-07 PROCEDURE — 76536 US EXAM OF HEAD AND NECK: CPT | Performed by: RADIOLOGY

## 2025-01-09 DIAGNOSIS — E04.2 MULTINODULAR GOITER: Primary | ICD-10-CM

## 2025-01-09 NOTE — RESULT ENCOUNTER NOTE
Can you let her know the nodule is slightly larger again, and we should consider a biopsy. This can be done after she finishes with her upcoming surgery.

## 2025-01-14 ENCOUNTER — HOSPITAL ENCOUNTER (OUTPATIENT)
Dept: RADIOLOGY | Facility: HOSPITAL | Age: 55
Discharge: HOME | End: 2025-01-14
Payer: COMMERCIAL

## 2025-01-14 VITALS
SYSTOLIC BLOOD PRESSURE: 133 MMHG | DIASTOLIC BLOOD PRESSURE: 69 MMHG | OXYGEN SATURATION: 95 % | HEART RATE: 76 BPM | RESPIRATION RATE: 18 BRPM

## 2025-01-14 DIAGNOSIS — E04.2 MULTINODULAR GOITER: ICD-10-CM

## 2025-01-14 PROCEDURE — 10005 FNA BX W/US GDN 1ST LES: CPT

## 2025-01-14 PROCEDURE — 88112 CYTOPATH CELL ENHANCE TECH: CPT | Mod: TC | Performed by: INTERNAL MEDICINE

## 2025-01-14 PROCEDURE — 2500000004 HC RX 250 GENERAL PHARMACY W/ HCPCS (ALT 636 FOR OP/ED): Performed by: NURSE PRACTITIONER

## 2025-01-14 RX ORDER — LIDOCAINE HYDROCHLORIDE 10 MG/ML
INJECTION, SOLUTION EPIDURAL; INFILTRATION; INTRACAUDAL; PERINEURAL
Status: COMPLETED | OUTPATIENT
Start: 2025-01-14 | End: 2025-01-14

## 2025-01-14 RX ADMIN — LIDOCAINE HYDROCHLORIDE 10 ML: 10 INJECTION, SOLUTION EPIDURAL; INFILTRATION; INTRACAUDAL; PERINEURAL at 13:15

## 2025-01-14 ASSESSMENT — PAIN SCALES - GENERAL
PAINLEVEL_OUTOF10: 0 - NO PAIN
PAINLEVEL_OUTOF10: 0 - NO PAIN

## 2025-01-14 NOTE — PRE-PROCEDURE NOTE
Interventional Radiology Preprocedure Note    Indication for procedure: The encounter diagnosis was Multinodular goiter.    Relevant review of systems:  Neck fullness, thyroid nodule    Relevant Labs:   Lab Results   Component Value Date    CREATININE 0.78 03/06/2024    EGFR >90 03/06/2024    INR 0.9 04/24/2023    PROTIME 10.7 04/24/2023       Planned Sedation/Anesthesia: local    Airway assessment: normal    Directed physical examination:    A&Ox3  Breathing Unlabored  RRR  Abdomen soft, nontender    US thyroid reviewed with surgically absent right thyroid. Multiple heterogenous nodules. There is a left superior TR III 1.2cm nodule, which has enlarged from prior exam. Plan for US guided FNA of this nodule today.     Benefits, risks and alternatives of procedure and planned sedation have been discussed with the patient and/or their representative. All questions answered and they agree to proceed.

## 2025-01-14 NOTE — POST-PROCEDURE NOTE
Interventional Radiology Brief Postprocedure Note    Attending: TYLER Puga    Assistant: none    Diagnosis: Enlarging TR III thyroid nodule    Description of procedure: A Fine Needle Aspiration was performed of the 1.2cm nodule of the left upper lobe of the thyroid.      Anesthesia:  Local    Complications: None    Estimated Blood Loss: minimal    Medications (Filter: Administrations occurring from 1257 to 1325 on 01/14/25) As of 01/14/25 1325      lidocaine PF (Xylocaine) 10 mg/mL (1 %) injection (mL) Total volume:  10 mL      Date/Time Rate/Dose/Volume Action       01/14/25  1315 10 mL Given                   ID Type Source Tests Collected by Time   1 : Left superior thyroid FNA Non-Gynecologic Cytology THYROID FINE NEEDLE ASPIRATION LEFT SUPERIOR LOBE CYTOLOGY CONSULTATION (NON-GYNECOLOGIC) TYLER Puga 1/14/2025 1318         See detailed result report with images in PACS.    The patient tolerated the procedure well without incident or complication and is in stable condition.

## 2025-01-15 ENCOUNTER — PATIENT MESSAGE (OUTPATIENT)
Dept: ENDOCRINOLOGY | Facility: CLINIC | Age: 55
End: 2025-01-15
Payer: COMMERCIAL

## 2025-01-15 DIAGNOSIS — E04.2 MULTINODULAR GOITER: Primary | ICD-10-CM

## 2025-01-15 DIAGNOSIS — E04.2 MULTINODULAR GOITER: ICD-10-CM

## 2025-01-15 LAB
LABORATORY COMMENT REPORT: NORMAL
LABORATORY COMMENT REPORT: NORMAL
PATH REPORT.FINAL DX SPEC: NORMAL
PATH REPORT.GROSS SPEC: NORMAL
PATH REPORT.TOTAL CANCER: NORMAL
TEST COMMENT - SURGICAL SENDOUT REQUEST: NORMAL

## 2025-01-16 ENCOUNTER — OFFICE VISIT (OUTPATIENT)
Dept: ORTHOPEDIC SURGERY | Facility: CLINIC | Age: 55
End: 2025-01-16
Payer: COMMERCIAL

## 2025-01-16 DIAGNOSIS — M17.12 PRIMARY OSTEOARTHRITIS OF LEFT KNEE: Primary | ICD-10-CM

## 2025-01-16 PROCEDURE — 99214 OFFICE O/P EST MOD 30 MIN: CPT | Performed by: ORTHOPAEDIC SURGERY

## 2025-01-16 NOTE — PROGRESS NOTES
Patient is a 54-year-old female presents today for discussion having decided proceed with left total knee arthroplasty.  She is failed a greater than 3-month trial reasonable conservative treatment including anti-inflammatories, home exercise program and cortisone injections.  She rates her pain as 9 out of 10.  She has difficulty walking sort of distance or going downstairs.  She would like to proceed with total knee arthroplasty which is indicated at this time.    Left knee:  AAOx3, NAD, walks with a moderate antalgic gait  Varus allignment  Range of motion lacks 10 degrees of full extension and flexes to 110 degrees  Stable to varus/valgus/anterior/posterior stress through out the range of motion  Slight laxity with varus stress  Diffuse medial  joint line tenderness to palpation  Moderate effusion  SILT in a eitan/saph/per/tib distribution  5/5 knee extension/df/pf/ehl  ½ dorsalis pedis and posterior tibial pulse  no popliteal lymphadenopathy  no other overlying lesions  mood: euthymic  Respirations non labored    Plain films were reviewed by myself in clinic today.  They have advanced osteoarthritis of their knee with significant joint space narrowing, bone on bone changes, underlying sclerosis and tricompartmental osteophytic change.    Patient is now failed a greater than 3-month trial of reasonable conservative treatment and wishes proceed with surgery which is indicated at this time.  Discussed the risk benefits alternatives to surgery.  All of their questions were answered.    Procedure: left total knee  Location: [facility]  ICD10: M17.12  CPT: 16662  Stay: outpatient  Equipment: [facility]    I talked with the patient at length about risks, limitations, benefits and alternatives to total knee replacement today. I reviewed concerns about implant wear, loosening, breakage, infection and infection prophylaxis, DVT, PE, death and other medical and anesthetic complications of surgery. We talked about the  potential for persistent pain following surgery since there are many possible causes for knee and leg pain. The patient was advised that knee replacement will only relieve pain that is coming from the knee. We talked about limited range of motion following knee replacement and the importance of physical therapy and their motivation. We talked about improvements in pain management post-operatively and our accelerated rehab program. We talked about wound healing issues and neurovascular complications of surgery. I reviewed functional and activity restrictions in detail. We discussed the fact that many of our patients are able to go home in 1 day or less depending on their health, mobility, pre-op preparation, individual home situation and personal preference.  The patient has identified their personal goals of their joint replacement surgery and recovery and we have discussed them. These are documented in our [employer] patient engagement platform. In addition, we have discussed the advantages and disadvantages of various implant and fixation options, as well as various surgical approaches.  The basic concepts of the joint replacement procedure has been reviewed with the patient and the patient has been provided the opportunity to see an actual implant either in the office or in our pre-op education class.  All of the patients questions were answered. The patient can call my office to schedule surgery and the pre-op teaching class. I told the patient that they should contact their primary care physician to discuss fitness for surgery.    This note was created using voice recognition software and was not corrected for typographical or grammatical errors.

## 2025-01-20 LAB — TEST COMMENT - SURGICAL SENDOUT REQUEST: NORMAL

## 2025-01-22 ENCOUNTER — HOSPITAL ENCOUNTER (OUTPATIENT)
Dept: RADIOLOGY | Facility: HOSPITAL | Age: 55
Discharge: HOME | End: 2025-01-22
Payer: COMMERCIAL

## 2025-01-22 ENCOUNTER — EDUCATION (OUTPATIENT)
Dept: ORTHOPEDIC SURGERY | Facility: HOSPITAL | Age: 55
End: 2025-01-22
Payer: COMMERCIAL

## 2025-01-22 ENCOUNTER — PRE-ADMISSION TESTING (OUTPATIENT)
Dept: PREADMISSION TESTING | Facility: HOSPITAL | Age: 55
End: 2025-01-22
Payer: COMMERCIAL

## 2025-01-22 VITALS
TEMPERATURE: 97.9 F | DIASTOLIC BLOOD PRESSURE: 68 MMHG | SYSTOLIC BLOOD PRESSURE: 116 MMHG | RESPIRATION RATE: 16 BRPM | BODY MASS INDEX: 28.28 KG/M2 | WEIGHT: 176 LBS | OXYGEN SATURATION: 97 % | HEART RATE: 79 BPM | HEIGHT: 66 IN

## 2025-01-22 DIAGNOSIS — M17.12 UNILATERAL PRIMARY OSTEOARTHRITIS, LEFT KNEE: ICD-10-CM

## 2025-01-22 DIAGNOSIS — M17.12 UNILATERAL PRIMARY OSTEOARTHRITIS, LEFT KNEE: Primary | ICD-10-CM

## 2025-01-22 DIAGNOSIS — Z01.818 PREOP TESTING: ICD-10-CM

## 2025-01-22 LAB
ALBUMIN SERPL BCP-MCNC: 4.1 G/DL (ref 3.4–5)
ALP SERPL-CCNC: 57 U/L (ref 33–110)
ALT SERPL W P-5'-P-CCNC: 10 U/L (ref 7–45)
ANION GAP SERPL CALC-SCNC: 13 MMOL/L (ref 10–20)
AST SERPL W P-5'-P-CCNC: 14 U/L (ref 9–39)
ATRIAL RATE: 75 BPM
BASOPHILS # BLD AUTO: 0.05 X10*3/UL (ref 0–0.1)
BASOPHILS NFR BLD AUTO: 0.7 %
BILIRUB SERPL-MCNC: 0.3 MG/DL (ref 0–1.2)
BUN SERPL-MCNC: 17 MG/DL (ref 6–23)
CALCIUM SERPL-MCNC: 9.7 MG/DL (ref 8.6–10.3)
CHLORIDE SERPL-SCNC: 106 MMOL/L (ref 98–107)
CO2 SERPL-SCNC: 26 MMOL/L (ref 21–32)
CREAT SERPL-MCNC: 0.71 MG/DL (ref 0.5–1.05)
EGFRCR SERPLBLD CKD-EPI 2021: >90 ML/MIN/1.73M*2
EOSINOPHIL # BLD AUTO: 0.2 X10*3/UL (ref 0–0.7)
EOSINOPHIL NFR BLD AUTO: 3 %
ERYTHROCYTE [DISTWIDTH] IN BLOOD BY AUTOMATED COUNT: 12.8 % (ref 11.5–14.5)
EST. AVERAGE GLUCOSE BLD GHB EST-MCNC: 100 MG/DL
GLUCOSE SERPL-MCNC: 89 MG/DL (ref 74–99)
HBA1C MFR BLD: 5.1 %
HCT VFR BLD AUTO: 39.9 % (ref 36–46)
HGB BLD-MCNC: 13.2 G/DL (ref 12–16)
IMM GRANULOCYTES # BLD AUTO: 0.01 X10*3/UL (ref 0–0.7)
IMM GRANULOCYTES NFR BLD AUTO: 0.1 % (ref 0–0.9)
LYMPHOCYTES # BLD AUTO: 1.95 X10*3/UL (ref 1.2–4.8)
LYMPHOCYTES NFR BLD AUTO: 28.9 %
MCH RBC QN AUTO: 28.2 PG (ref 26–34)
MCHC RBC AUTO-ENTMCNC: 33.1 G/DL (ref 32–36)
MCV RBC AUTO: 85 FL (ref 80–100)
MONOCYTES # BLD AUTO: 0.42 X10*3/UL (ref 0.1–1)
MONOCYTES NFR BLD AUTO: 6.2 %
NEUTROPHILS # BLD AUTO: 4.11 X10*3/UL (ref 1.2–7.7)
NEUTROPHILS NFR BLD AUTO: 61.1 %
NRBC BLD-RTO: NORMAL /100{WBCS}
P AXIS: 23 DEGREES
P OFFSET: 186 MS
P ONSET: 138 MS
PLATELET # BLD AUTO: 281 X10*3/UL (ref 150–450)
POTASSIUM SERPL-SCNC: 4.2 MMOL/L (ref 3.5–5.3)
PR INTERVAL: 166 MS
PROT SERPL-MCNC: 7.4 G/DL (ref 6.4–8.2)
Q ONSET: 221 MS
QRS COUNT: 12 BEATS
QRS DURATION: 78 MS
QT INTERVAL: 394 MS
QTC CALCULATION(BAZETT): 439 MS
QTC FREDERICIA: 424 MS
R AXIS: 10 DEGREES
RBC # BLD AUTO: 4.68 X10*6/UL (ref 4–5.2)
SODIUM SERPL-SCNC: 141 MMOL/L (ref 136–145)
T AXIS: 10 DEGREES
T OFFSET: 418 MS
VENTRICULAR RATE: 75 BPM
WBC # BLD AUTO: 6.7 X10*3/UL (ref 4.4–11.3)

## 2025-01-22 PROCEDURE — 85025 COMPLETE CBC W/AUTO DIFF WBC: CPT

## 2025-01-22 PROCEDURE — 83036 HEMOGLOBIN GLYCOSYLATED A1C: CPT | Mod: BEALAB

## 2025-01-22 PROCEDURE — 93010 ELECTROCARDIOGRAM REPORT: CPT | Performed by: INTERNAL MEDICINE

## 2025-01-22 PROCEDURE — 99203 OFFICE O/P NEW LOW 30 MIN: CPT

## 2025-01-22 PROCEDURE — 84520 ASSAY OF UREA NITROGEN: CPT

## 2025-01-22 PROCEDURE — 36415 COLL VENOUS BLD VENIPUNCTURE: CPT

## 2025-01-22 PROCEDURE — 77073 BONE LENGTH STUDIES: CPT

## 2025-01-22 PROCEDURE — 93005 ELECTROCARDIOGRAM TRACING: CPT

## 2025-01-22 PROCEDURE — 77073 BONE LENGTH STUDIES: CPT | Performed by: STUDENT IN AN ORGANIZED HEALTH CARE EDUCATION/TRAINING PROGRAM

## 2025-01-22 PROCEDURE — 73562 X-RAY EXAM OF KNEE 3: CPT | Performed by: STUDENT IN AN ORGANIZED HEALTH CARE EDUCATION/TRAINING PROGRAM

## 2025-01-22 PROCEDURE — 87081 CULTURE SCREEN ONLY: CPT | Mod: BEALAB

## 2025-01-22 PROCEDURE — 73562 X-RAY EXAM OF KNEE 3: CPT | Mod: LT

## 2025-01-22 RX ORDER — IBUPROFEN 800 MG/1
800 TABLET ORAL EVERY 8 HOURS PRN
COMMUNITY

## 2025-01-22 RX ORDER — CHLORHEXIDINE GLUCONATE ORAL RINSE 1.2 MG/ML
15 SOLUTION DENTAL DAILY
Qty: 30 ML | Refills: 0 | Status: SHIPPED | OUTPATIENT
Start: 2025-01-22 | End: 2025-01-24

## 2025-01-22 RX ORDER — BISMUTH SUBSALICYLATE 262 MG
1 TABLET,CHEWABLE ORAL DAILY
COMMUNITY

## 2025-01-22 RX ORDER — CHLORHEXIDINE GLUCONATE 40 MG/ML
SOLUTION TOPICAL DAILY
Qty: 470 ML | Refills: 0 | Status: SHIPPED | OUTPATIENT
Start: 2025-01-22 | End: 2025-01-27

## 2025-01-22 ASSESSMENT — DUKE ACTIVITY SCORE INDEX (DASI)
CAN YOU DO MODERATE WORK AROUND THE HOUSE LIKE VACUUMING, SWEEPING FLOORS OR CARRYING GROCERIES: YES
DASI METS SCORE: 5.7
CAN YOU DO YARD WORK LIKE RAKING LEAVES, WEEDING OR PUSHING A MOWER: NO
CAN YOU TAKE CARE OF YOURSELF (EAT, DRESS, BATHE, OR USE TOILET): YES
CAN YOU RUN A SHORT DISTANCE: NO
CAN YOU PARTICIPATE IN STRENOUS SPORTS LIKE SWIMMING, SINGLES TENNIS, FOOTBALL, BASKETBALL, OR SKIING: NO
CAN YOU HAVE SEXUAL RELATIONS: YES
CAN YOU DO LIGHT WORK AROUND THE HOUSE LIKE DUSTING OR WASHING DISHES: YES
CAN YOU WALK INDOORS, SUCH AS AROUND YOUR HOUSE: YES
TOTAL_SCORE: 24.2
CAN YOU CLIMB A FLIGHT OF STAIRS OR WALK UP A HILL: YES
CAN YOU WALK A BLOCK OR TWO ON LEVEL GROUND: YES
CAN YOU PARTICIPATE IN MODERATE RECREATIONAL ACTIVITIES LIKE GOLF, BOWLING, DANCING, DOUBLES TENNIS OR THROWING A BASEBALL OR FOOTBALL: NO
CAN YOU DO HEAVY WORK AROUND THE HOUSE LIKE SCRUBBING FLOORS OR LIFTING AND MOVING HEAVY FURNITURE: NO

## 2025-01-22 ASSESSMENT — PAIN DESCRIPTION - DESCRIPTORS: DESCRIPTORS: ACHING

## 2025-01-22 ASSESSMENT — PAIN - FUNCTIONAL ASSESSMENT: PAIN_FUNCTIONAL_ASSESSMENT: 0-10

## 2025-01-22 ASSESSMENT — PAIN SCALES - GENERAL: PAINLEVEL_OUTOF10: 6

## 2025-01-22 NOTE — H&P (VIEW-ONLY)
"CPM/PAT Evaluation       Name: Alisa Bland (Alisa Bland \"Charlotte\")  /Age: 1970/54 y.o.     In-Person       Chief Complaint: Unilateral primary osteoarthritis, left knee     HPI  Patient is an alert and oriented 54 year old female scheduled for a left total knee arthroplasty on 2025 with Dr. Marques for unilateral primary osteoarthritis, left knee. She endorses left knee pain that she currently rates at a 6/10 which worsens during ambulation and activity. She is ambulatory without assistive devices with a current METS score of 5.7. PMHX includes OA, anxiety, and depression. Presents to [facility] today for perioperative risk stratification and optimization.     Past Medical History:   Diagnosis Date    Anxiety     Arthritis     Depression     Hyperparathyroidism (Multi)     Personal history of other specified conditions 2018    History of fatigue    Personal history of other specified conditions 2016    History of weight gain    Thyroid nodule      Past Surgical History:   Procedure Laterality Date    BREAST BIOPSY Right     Hx Right benign core biopsy    BREAST SURGERY  2015    Breast Surgery    PARATHYROID GLAND SURGERY      THYROIDECTOMY, PARTIAL       Patient  reports being sexually active and has had partner(s) who are male. She reports using the following method of birth control/protection: Post-menopausal.    Family History   Problem Relation Name Age of Onset    Coronary artery disease Father Yuri     Heart disease Father Yuri     Hypertension Father Yuri     Kidney disease Father Yuri     Coronary artery disease Paternal Grandmother Emmy Flower     Heart attack Paternal Grandmother Emmy Flower     Celiac disease Sister Aminata Russell      No Known Allergies    Medication Documentation Review Audit       Reviewed by Shane Huston LPN (Licensed Practical Nurse) on 25 at 0800      Medication Order Taking? Sig Documenting Provider Last Dose Status "   ibuprofen 800 mg tablet [id number] Yes Take 1 tablet (800 mg) by mouth every 8 hours if needed for mild pain (1 - 3). Historical Provider, MD 1/21/2025 Bedtime Active   multivitamin tablet [id number] Yes Take 1 tablet by mouth once daily. Historical Provider, MD 1/21/2025 Active   semaglutide [id number] Yes Per Buderer protocol inject 0.3mg subcutaneously once a week for 4 weeks, then increase to 0.6mg once a week. Kris Tapia MD Past Week Active   sertraline (Zoloft) 50 mg tablet [id number] Yes Take 1 tablet (50 mg) by mouth once daily. Karlene Sandoval DO 1/21/2025 Evening Active                   Review of Systems   Constitutional: Negative for chills, decreased appetite, diaphoresis, fever and malaise/fatigue.   Eyes:  Negative for blurred vision and double vision.   Cardiovascular:  Negative for chest pain, claudication, cyanosis, dyspnea on exertion, irregular heartbeat, leg swelling, near-syncope and palpitations.   Respiratory:  Negative for cough, hemoptysis, shortness of breath and wheezing.    Endocrine: Negative for cold intolerance, heat intolerance, polydipsia, polyphagia and polyuria.   Gastrointestinal:  Negative for abdominal pain, constipation, diarrhea, dysphagia, nausea and vomiting.   Genitourinary:  Negative for bladder incontinence, dysuria, hematuria, incomplete emptying, nocturia, frequency, pelvic pain and urgency.   Neurological:  Negative for headaches, light-headedness, paresthesias, sensory change and weakness.   Psychiatric/Behavioral:  Negative for altered mental status.    Musculoskeletal: Negative for myalgias. Positive for arthralgias     Vitals and nursing note reviewed.     Physical exam  Constitutional:       Appearance: Normal appearance. She is Overweight.   HENT:      Head: Normocephalic and atraumatic.      Mouth/Throat:      Mouth: Mucous membranes are moist.      Pharynx: Oropharynx is clear.   Eyes:      Extraocular Movements: Extraocular movements  "intact.      Conjunctiva/sclera: Conjunctivae normal.      Pupils: Pupils are equal, round, and reactive to light.   Cardiovascular:      PMI at left midclavicular line. Normal rate. Regular rhythm. Normal S1. Normal S2.       Murmurs: There is no murmur.      No gallop.  No click. No rub.       No audible carotid bruit     No lower extremity edema on exam  Pulmonary:      Effort: Pulmonary effort is normal.      Breath sounds: Normal breath sounds.   Abdominal:      General: Abdomen is flat. Bowel sounds are normal.      Palpations: Abdomen is soft and non-tender  Musculoskeletal:      Cervical back: Normal range of motion and neck supple.      Knee, left: Limited ROM  Skin:     General: Skin is warm and dry.      Capillary Refill: Capillary refill takes less than 2 seconds.   Neurological:      General: No focal deficit present.      Mental Status: She is alert and oriented to person, place, and time. Mental status is at baseline.   Psychiatric:         Mood and Affect: Mood normal.         Behavior: Behavior normal.         Thought Content: Thought content normal.         Judgment: Judgment normal.     Vitals and nursing note reviewed. Physical exam within normal limits.     Visit Vitals  /68   Pulse 79   Temp 36.6 °C (97.9 °F) (Temporal)   Resp 16   Ht 1.676 m (5' 6\")   Wt 79.8 kg (176 lb)   SpO2 97%   BMI 28.41 kg/m²   OB Status Perimenopausal   Smoking Status Never   BSA 1.93 m²     DASI Risk Score      Flowsheet Row Pre-Admission Testing from 1/22/2025 in [facility] Questionnaire Series Submission from 1/13/2025 in [facility] with Generic Provider Uche   Can you take care of yourself (eat, dress, bathe, or use toilet)?  2.75 filed at 01/22/2025 0835 2.75  filed at 01/13/2025 0628   Can you walk indoors, such as around your house? 1.75 filed at 01/22/2025 0835 1.75  filed at 01/13/2025 0628   Can you walk a block or two on level ground?  2.75 filed at 01/22/2025 0835 0  filed at " 01/13/2025 0628   Can you climb a flight of stairs or walk up a hill? 5.5 filed at 01/22/2025 0835 0  filed at 01/13/2025 0628   Can you run a short distance? 0 filed at 01/22/2025 0835 0  filed at 01/13/2025 0628   Can you do light work around the house like dusting or washing dishes? 2.7 filed at 01/22/2025 0835 2.7  filed at 01/13/2025 0628   Can you do moderate work around the house like vacuuming, sweeping floors or carrying groceries? 3.5 filed at 01/22/2025 0835 0  filed at 01/13/2025 0628   Can you do heavy work around the house like scrubbing floors or lifting and moving heavy furniture?  0 filed at 01/22/2025 0835 0  filed at 01/13/2025 0628   Can you do yard work like raking leaves, weeding or pushing a mower? 0 filed at 01/22/2025 0835 0  filed at 01/13/2025 0628   Can you have sexual relations? 5.25 filed at 01/22/2025 0835 5.25  filed at 01/13/2025 0628   Can you participate in moderate recreational activities like golf, bowling, dancing, doubles tennis or throwing a baseball or football? 0 filed at 01/22/2025 0835 0  filed at 01/13/2025 0628   Can you participate in strenous sports like swimming, singles tennis, football, basketball, or skiing? 0 filed at 01/22/2025 0835 0  filed at 01/13/2025 0628   DASI SCORE 24.2 filed at 01/22/2025 0835 12.45  filed at 01/13/2025 0628   METS Score (Will be calculated only when all the questions are answered) 5.7 filed at 01/22/2025 0835 4.3  filed at 01/13/2025 0628          Capgenovevai DVT Assessment      Flowsheet Row Pre-Admission Testing from 1/22/2025 in [facility]   DVT Score (IF A SCORE IS NOT CALCULATING, MUST SELECT A BMI TO COMPLETE) 7 filed at 01/22/2025 0835   Surgical Factors Elective major lower extremity arthroplasty filed at 01/22/2025 0835   BMI (BMI MUST BE CHOSEN) 30 or less filed at 01/22/2025 0835          Modified Frailty Index      Flowsheet Row Pre-Admission Testing from 1/22/2025 in [facility]    Non-independent functional status (problems with dressing, bathing, personal grooming, or cooking) 0 filed at 01/22/2025 0836   History of diabetes mellitus  0 filed at 01/22/2025 0836   History of COPD 0 filed at 01/22/2025 0836   History of CHF No filed at 01/22/2025 0836   History of MI 0 filed at 01/22/2025 0836   History of Percutaneous Coronary Intervention, Cardiac Surgery, or Angina No filed at 01/22/2025 0836   Hypertension requiring the use of medication  0 filed at 01/22/2025 0836   Peripheral vascular disease 0 filed at 01/22/2025 0836   Impaired sensorium (cognitive impairement or loss, clouding, or delirium) 0 filed at 01/22/2025 0836   TIA or CVA withouy residual deficit 0 filed at 01/22/2025 0836   Cerebrovascular accident with deficit 0 filed at 01/22/2025 0836   Modified Frailty Index Calculator 0 filed at 01/22/2025 0836          CHADS2 Stroke Risk  Current as of 10 minutes ago        N/A 3 to 100%: High Risk   2 to < 3%: Medium Risk   0 to < 2%: Low Risk     Last Change: N/A          This score determines the patient's risk of having a stroke if the patient has atrial fibrillation.        This score is not applicable to this patient. Components are not calculated.          Revised Cardiac Risk Index      Flowsheet Row Pre-Admission Testing from 1/22/2025 in [facility]   High-Risk Surgery (Intraperitoneal, Intrathoracic,Suprainguinal vascular) 0 filed at 01/22/2025 0835   History of ischemic heart disease (History of MI, History of positive exercuse test, Current chest paint considered due to myocardial ischemia, Use of nitrate therapy, ECG with pathological Q Waves) 0 filed at 01/22/2025 0835   History of congestive heart failure (pulmonary edemia, bilateral rales or S3 gallop, Paroxysmal nocturnal dyspnea, CXR showing pulmonary vascular redistribution) 0 filed at 01/22/2025 0835   History of cerebrovascular disease (Prior TIA or stroke) 0 filed at 01/22/2025 0835    Pre-operative insulin treatment 0 filed at 01/22/2025 0835   Pre-operative creatinine>2 mg/dl 0 filed at 01/22/2025 0835   Revised Cardiac Risk Calculator 0 filed at 01/22/2025 0835          Apfel Simplified Score    No data to display       Risk Analysis Index Results This Encounter    No data found in the last 10 encounters.       Stop Bang Score      Flowsheet Row Pre-Admission Testing from 1/22/2025 in [facility] Questionnaire Series Submission from 1/13/2025 in [facility] with Generic Provider Uche   Do you snore loudly? 1 filed at 01/22/2025 0804 1  filed at 01/13/2025 0628   Do you often feel tired or fatigued after your sleep? 0 filed at 01/22/2025 0804 0  filed at 01/13/2025 0628   Has anyone ever observed you stop breathing in your sleep? 0 filed at 01/22/2025 0804 0  filed at 01/13/2025 0628   Do you have or are you being treated for high blood pressure? 0 filed at 01/22/2025 0804 0  filed at 01/13/2025 0628   Recent BMI (Calculated) 28.4 filed at 01/22/2025 0804 28.3  filed at 01/13/2025 0628   Is BMI greater than 35 kg/m2? 0=No filed at 01/22/2025 0804 0=No  filed at 01/13/2025 0628   Age older than 50 years old? 1=Yes filed at 01/22/2025 0804 1=Yes  filed at 01/13/2025 0628   Is your neck circumference greater than 17 inches (Male) or 16 inches (Female)? 0 filed at 01/22/2025 0804 --   Gender - Male 0=No filed at 01/22/2025 0804 0=No  filed at 01/13/2025 0628   STOP-BANG Total Score 2 filed at 01/22/2025 0804 --          Prodigy: High Risk  Total Score: 0          ARISCAT Score for Postoperative Pulmonary Complications      Flowsheet Row Pre-Admission Testing from 1/22/2025 in [facility]   Age Calculated Score 3 filed at 01/22/2025 0836   Preoperative SpO2 0 filed at 01/22/2025 0836   Respiratory infection in the last month Either upper or lower (i.e., URI, bronchitis, pneumonia), with fever and antibiotic treatment 0 filed at 01/22/2025 0836   Preoperative  anemia (Hgb less than 10 g/dl) 0 filed at 01/22/2025 0836   Surgical incision  0 filed at 01/22/2025 0836   Duration of surgery  16 filed at 01/22/2025 0836   Emergency Procedure  0 filed at 01/22/2025 0836   ARISCAT Total Score  19 filed at 01/22/2025 0836          Ngozi Perioperative Risk for Myocardial Infarction or Cardiac Arrest (KELI)      Flowsheet Row Pre-Admission Testing from 1/22/2025 in [facility]   Calculated Age Score 1.08 filed at 01/22/2025 0836   Functional Status  0 filed at 01/22/2025 0836   ASA Class  -3.29 filed at 01/22/2025 0836   Creatinine 0 filed at 01/22/2025 0836   Type of Procedure  0.80 filed at 01/22/2025 0836   KELI Total Score  -6.66 filed at 01/22/2025 0836   KELI % 0.13 filed at 01/22/2025 0836          Assessment & Plan:    Neuro:  No diagnosis or significant findings on chart review or clinical presentation and evaluation.     HEENT/Airway:  No diagnosis or significant findings on chart review or clinical presentation and evaluation.   STOP-BANG Score-3 points moderaterisk for RICHARD    Mallampati::  II    TM distance::  >3 FB    Neck ROM::  Full  Dentures-denies  Crowns-denies  Implants-denies    Cardiovascular:  No diagnosis or significant findings on chart review or clinical presentation and evaluation.   METS: 5.7  RCRI: 0 points, 3.9%  risk for postoperative MACE   KELI: 0.13% risk for postoperative MACE  EKG -normal sinus rhythm Rate-75 No acute changes.     Pulmonary:  No diagnosis or significant findings on chart review or clinical presentation and evaluation.   ARISCAT: <26 points, 1.6% risk of in-hospital postoperative pulmonary complication  PRODIGY: Low risk for opioid induced respiratory depression  Smoking History-She has never smoked.  Discussed smoking cessation and deep breathing handout given    Renal/Urinary:  No diagnosis or significant findings on chart review or clinical presentation and evaluation.   CMP-Reviewed,  stable  Creatinine-0.71  GFR->90    Endocrine:  No diagnosis or significant findings on chart review or clinical presentation and evaluation.   ATL0R-8.1%    Hematologic/Immunology:  No diagnosis or significant findings on chart review or clinical presentation and evaluation.  The patient is not a Jehovah’s witness and will accept blood and blood products if medically indicated.   History of previous blood transfusions No  CBC-Reviewed, stable  HGB-13.2  Caprini Score 7, patient at High for postoperative DVT. Pt supplied education/VTE handout  Anticoagulation use: No     Gastrointestinal:   No diagnosis or significant findings on chart review or clinical presentation and evaluation.   Recreational drug use: alcohol  Alcohol use social drinker    Infectious disease:   No diagnosis or significant findings on chart review or clinical presentation and evaluation.   Prescription provided for CHG body wash and dental rinse. CHG use instructions reviewed and provided to patient.  Staph screen collected-Positive for MSSA    Musculoskeletal:   No diagnosis or significant findings on chart review or clinical presentation and evaluation.   JHFRAT score-0 points. low risk for falls    Anesthesia:  ASA 2 - Patient with mild systemic disease with no functional limitations  Anticipated anesthesia-Consult  History of General anesthesia- yes  Complications- No anesthesia complications  No family history of anesthesia complications    Abnormalities noted on PAT evaluation: No    Labs & Imaging ordered:  CBC, CMP, HBA1C, MRSA, EKG  Nickel/metal allergy-negative  Shellfish allergy-negative    Overall, patient Low Risk for the scheduled Moderate Risk surgery. Discussed with patient medication instructions, NPO guidelines, and any questions or concerns.     Face to face time spent 45 minutes

## 2025-01-22 NOTE — GROUP NOTE
In addition to the group class activities, Charlotte Bland had the following lab work completed:  No orders of the defined types were placed in this encounter.      A new History and Physical was not completed.    This class lasted approximately 2 hour and had 6 participants. The nurse instructor covered the following topics:  MyChart Enrollment  Communication with Care Team  My Chart is the best form of communication to reach all of your caregivers  You can send messages to specific care givers, or a care team  Continued Education  You will be enrolled in a Joint Replacement care plan to receive additional education before and after surgery  You can review a short recording of the class content - [email or web address]  Access to Medical Records  You can access test results, office notes, appointments, etc.  You can connect to other healthcare systems who use [facility] ([facility], [facility], [facility], etc.)  [employer]  Program Information  Consent to Enroll    Background/Understanding of Joint Replacement Surgery  Potential for same day discharge  Any questions or concerns to be directed to the surgeon's office  Not all patients are appropriate for same day discharge    How to Prepare for Surgery  Use of Nicotine Products/Smoking  Stop several weeks before surgery  Such products slow down the healing process and increase risk of post-op infection and complications  Clearance for Surgery  Medical Clearance by Specialists  Dental Clearance  Cracked/Broken/Loose teeth left untreated may postpone surgery  The importance of post-op antibiotics for dental visits per surgeon protocol  Preadmission Testing  **Potential for postponed surgery if appropriate clearance is not obtained  Medication Instruction  Follow instructions provided by the doctor who prescribes your medication (typically, but not limited to cardiologist)  Preadmission testing will provide additional instructions during your appointment on what to  stop and what to take as you get closer to surgery  For clarification of these instructions, please call preadmission testing directly - [phone]  Tips for Preparing the home for discharge from the hospital  Care Partner  Requirement for surgery, the patient must have a plan to have help at home  Potential for postponed surgery if plan for home support cannot be established  Your Care Partner does not need medical training  How the care partner can help after surgery  CHG Body Wash/Mouth Wash  Follow the instructions given at preadmission testing  Body wash is to be used on the body and hair for 5 washes  Mouthwash is to be used the night before and morning of surgery  **This is a system-wide protocol developed by infectious disease professionals, we will not alter our recommendations for those with sensitive skin or those who have special hair needs.  Please follow the instructions as they are written as this will provide the best infection prevention measures for surgery.  Should you have an allergy to one of the products, please discuss with your preadmission team**    What to Expect in the Hospital/At Home  Morning of Surgery NPO Guidelines  Nothing to eat after midnight  Water can be consumed up to 2 hours prior to arrival  Surgical and Post-Surgical Care Team  Surgical Team  Anesthesia Team  Nursing  Physical Therapy  Care Coordinating  Pharmacy  Hospital Arrival Instructions  Arrive at the time provided to you  Consider traffic patterns (rush-hour) based on arrival time  Have arrangements made for a ride home  If discharging same day, care partner should remain at the hospital  Recovering after Surgery  Recovery Room - Visitors are not brought back  Transition to hospital room - 2nd Floor, Visitors will be directed to your room  The presence of and strategies for controlling surgical pain and swelling  The importance of early mobility  Side effects after surgery  What to expect if staying  overnight    Discharge Planning  The intended plan for discharge will be for patients to discharge home  All patients require a care partner (family, friend, neighbor, etc.) to stay with the patient for the first few nights after surgery  The inability to secure help at home may postpone surgery  Home Care Services set up per surgeon order  Physical Therapy  Occupational Therapy  **If desired, private duty care can be arranged by the patient ahead of time**  Outpatient Physical Therapy per surgeon order    Recovering at Home  Wound Care  Follow wound care instructions found in your discharge paperwork  Bandage is water-resistant and you may shower with the bandage  Do not scrub directly over the bandage  Do not submerge in water until cleared (bathtub, hot tub, pool, etc.)    Post-Op Risk Prevention  Infection Prevention  Promptly seek treatment for any infections post-operatively  Routine dental visits must be postponed for 3 months after surgery  Your surgeon may require antibiotics prior to future dental visits  Any concerns for infection not related directly to the knee or the hip should be managed by your primary care provider  Blood Clots  Be sure to complete the course of blood thinning medication as prescribed by your surgeon  Movement every 1-2 hours during the day is encouraged to prevent blood clots  Monitor for signs of blood clots  Wear compression stockings as prescribed by your surgeon  Constipation  Constipation is common following surgery  Drink plenty of fluids  Take stool softener/laxative as prescribed by your surgeon  Move around frequently  Eat foods high in fiber  Fall Prevention  Prepare home ahead of time to clear space to move with walker  Remove throw rugs and electrical cords from walkways  Install railings near any stairways with more than 2 steps  Use night lights for increased visibility at night  Continue to use your assistive device until cleared by surgeon or physical  therapy  Dislocation Prevention - Not all procedures will have dislocation precautions  Follow dislocation precautions provided by your surgeon  It is OK to resume sexual activity about 6 weeks following surgery  Be sure to follow any dislocation precautions assigned    Durable Medical Equipment  Cold Therapy  Breg Cold Therapy Machines  Ice/Gel Packs  Assistive Devices  Folding Walker with Wheels (in the front only)  No Rollators  Crutches if approved by Physical Therapy and Surgeon after surgery  Hip Kits  Raised Toilet Seats  Additional Compression Stockings    Joint Preservation  Healthy Activities when Cleared  Walking  Swimming  Bike Riding  Activities to Avoid  Refrain from repetitive motions which have a high impact on the joint  Gradual Progression  Progress activity slowly, listen to your body  Common Findings - NORMAL after surgery  Clicking/Grinding  Numbness near incision    Physical Therapy  Prehabilitation exercises  START TODAY ON BOTH LEGS  Surgery Specific Precautions  Follow surgery specific precautions found in your discharge paperwork    Follow-Up Visit  All patients will see their surgeon for a follow up visit after surgery  The visit may range from 2-6 weeks after surgery and is surgeon specific      Please don't hesitate to reach out if you have any additional questions or concerns.    Trista Melgar MBA, BSN, RN-BC, ONC  Rubi Hill, RN  HAI AlarconN, RN  Orthopedic Program Navigators  [facility]   [phone]

## 2025-01-22 NOTE — CPM/PAT H&P
"CPM/PAT Evaluation       Name: Alisa Bland (Alisa Bland \"Charlotte\")  /Age: 1970/54 y.o.     In-Person       Chief Complaint: Unilateral primary osteoarthritis, left knee     HPI  Patient is an alert and oriented 54 year old female scheduled for a left total knee arthroplasty on 2025 with Dr. Marques for unilateral primary osteoarthritis, left knee. She endorses left knee pain that she currently rates at a 6/10 qhich worsens during ambulation and activity. She is ambulatory without assistive devices with a current METS score of 5.7. PMHX includes OA, anxiety, and depression. Presents to [facility] PAT today for perioperative risk stratification and optimization.     Past Medical History:   Diagnosis Date    Anxiety     Arthritis     Depression     Hyperparathyroidism (Multi)     Personal history of other specified conditions 2018    History of fatigue    Personal history of other specified conditions 2016    History of weight gain    Thyroid nodule      Past Surgical History:   Procedure Laterality Date    BREAST BIOPSY Right     Hx Right benign core biopsy    BREAST SURGERY  2015    Breast Surgery    PARATHYROID GLAND SURGERY      THYROIDECTOMY, PARTIAL       Patient  reports being sexually active and has had partner(s) who are male. She reports using the following method of birth control/protection: Post-menopausal.    Family History   Problem Relation Name Age of Onset    Coronary artery disease Father Yuri     Heart disease Father Yuri     Hypertension Father Yuri     Kidney disease Father Yuri     Coronary artery disease Paternal Grandmother Emmy Flower     Heart attack Paternal Grandmother Emmy Flower     Celiac disease Sister Aminata Russell      No Known Allergies    Medication Documentation Review Audit       Reviewed by Shane Huston LPN (Licensed Practical Nurse) on 25 at 0800      Medication Order Taking? Sig Documenting Provider Last Dose Status "   ibuprofen 800 mg tablet [id number] Yes Take 1 tablet (800 mg) by mouth every 8 hours if needed for mild pain (1 - 3). Historical Provider, MD 1/21/2025 Bedtime Active   multivitamin tablet [id number] Yes Take 1 tablet by mouth once daily. Historical Provider, MD 1/21/2025 Active   semaglutide [id number] Yes Per Buderer protocol inject 0.3mg subcutaneously once a week for 4 weeks, then increase to 0.6mg once a week. Kris Tapia MD Past Week Active   sertraline (Zoloft) 50 mg tablet [id number] Yes Take 1 tablet (50 mg) by mouth once daily. Karlene Sandoval DO 1/21/2025 Evening Active                   Review of Systems   Constitutional: Negative for chills, decreased appetite, diaphoresis, fever and malaise/fatigue.   Eyes:  Negative for blurred vision and double vision.   Cardiovascular:  Negative for chest pain, claudication, cyanosis, dyspnea on exertion, irregular heartbeat, leg swelling, near-syncope and palpitations.   Respiratory:  Negative for cough, hemoptysis, shortness of breath and wheezing.    Endocrine: Negative for cold intolerance, heat intolerance, polydipsia, polyphagia and polyuria.   Gastrointestinal:  Negative for abdominal pain, constipation, diarrhea, dysphagia, nausea and vomiting.   Genitourinary:  Negative for bladder incontinence, dysuria, hematuria, incomplete emptying, nocturia, frequency, pelvic pain and urgency.   Neurological:  Negative for headaches, light-headedness, paresthesias, sensory change and weakness.   Psychiatric/Behavioral:  Negative for altered mental status.    Musculoskeletal: Negative for myalgias. Positive for arthralgias     Vitals and nursing note reviewed.     Physical exam  Constitutional:       Appearance: Normal appearance. She is Overweight.   HENT:      Head: Normocephalic and atraumatic.      Mouth/Throat:      Mouth: Mucous membranes are moist.      Pharynx: Oropharynx is clear.   Eyes:      Extraocular Movements: Extraocular movements  "intact.      Conjunctiva/sclera: Conjunctivae normal.      Pupils: Pupils are equal, round, and reactive to light.   Cardiovascular:      PMI at left midclavicular line. Normal rate. Regular rhythm. Normal S1. Normal S2.       Murmurs: There is no murmur.      No gallop.  No click. No rub.       No audible carotid bruit     No lower extremity edema on exam  Pulmonary:      Effort: Pulmonary effort is normal.      Breath sounds: Normal breath sounds.   Abdominal:      General: Abdomen is flat. Bowel sounds are normal.      Palpations: Abdomen is soft and non-tender  Musculoskeletal:      Cervical back: Normal range of motion and neck supple.      Knee, left: Limited ROM  Skin:     General: Skin is warm and dry.      Capillary Refill: Capillary refill takes less than 2 seconds.   Neurological:      General: No focal deficit present.      Mental Status: She is alert and oriented to person, place, and time. Mental status is at baseline.   Psychiatric:         Mood and Affect: Mood normal.         Behavior: Behavior normal.         Thought Content: Thought content normal.         Judgment: Judgment normal.     Vitals and nursing note reviewed. Physical exam within normal limits.     Visit Vitals  /68   Pulse 79   Temp 36.6 °C (97.9 °F) (Temporal)   Resp 16   Ht 1.676 m (5' 6\")   Wt 79.8 kg (176 lb)   SpO2 97%   BMI 28.41 kg/m²   OB Status Perimenopausal   Smoking Status Never   BSA 1.93 m²     DASI Risk Score      Flowsheet Row Pre-Admission Testing from 1/22/2025 in [facility] Questionnaire Series Submission from 1/13/2025 in [facility] [place] with Generic Provider Uche   Can you take care of yourself (eat, dress, bathe, or use toilet)?  2.75 filed at 01/22/2025 0835 2.75  filed at 01/13/2025 0628   Can you walk indoors, such as around your house? 1.75 filed at 01/22/2025 0835 1.75  filed at 01/13/2025 0628   Can you walk a block or two on level ground?  2.75 filed at 01/22/2025 0835 0  filed at " 01/13/2025 0628   Can you climb a flight of stairs or walk up a hill? 5.5 filed at 01/22/2025 0835 0  filed at 01/13/2025 0628   Can you run a short distance? 0 filed at 01/22/2025 0835 0  filed at 01/13/2025 0628   Can you do light work around the house like dusting or washing dishes? 2.7 filed at 01/22/2025 0835 2.7  filed at 01/13/2025 0628   Can you do moderate work around the house like vacuuming, sweeping floors or carrying groceries? 3.5 filed at 01/22/2025 0835 0  filed at 01/13/2025 0628   Can you do heavy work around the house like scrubbing floors or lifting and moving heavy furniture?  0 filed at 01/22/2025 0835 0  filed at 01/13/2025 0628   Can you do yard work like raking leaves, weeding or pushing a mower? 0 filed at 01/22/2025 0835 0  filed at 01/13/2025 0628   Can you have sexual relations? 5.25 filed at 01/22/2025 0835 5.25  filed at 01/13/2025 0628   Can you participate in moderate recreational activities like golf, bowling, dancing, doubles tennis or throwing a baseball or football? 0 filed at 01/22/2025 0835 0  filed at 01/13/2025 0628   Can you participate in strenous sports like swimming, singles tennis, football, basketball, or skiing? 0 filed at 01/22/2025 0835 0  filed at 01/13/2025 0628   DASI SCORE 24.2 filed at 01/22/2025 0835 12.45  filed at 01/13/2025 0628   METS Score (Will be calculated only when all the questions are answered) 5.7 filed at 01/22/2025 0835 4.3  filed at 01/13/2025 0628          Capgenovevai DVT Assessment      Flowsheet Row Pre-Admission Testing from 1/22/2025 in [facility]   DVT Score (IF A SCORE IS NOT CALCULATING, MUST SELECT A BMI TO COMPLETE) 7 filed at 01/22/2025 0835   Surgical Factors Elective major lower extremity arthroplasty filed at 01/22/2025 0835   BMI (BMI MUST BE CHOSEN) 30 or less filed at 01/22/2025 0835          Modified Frailty Index      Flowsheet Row Pre-Admission Testing from 1/22/2025 in [facility]    Non-independent functional status (problems with dressing, bathing, personal grooming, or cooking) 0 filed at 01/22/2025 0836   History of diabetes mellitus  0 filed at 01/22/2025 0836   History of COPD 0 filed at 01/22/2025 0836   History of CHF No filed at 01/22/2025 0836   History of MI 0 filed at 01/22/2025 0836   History of Percutaneous Coronary Intervention, Cardiac Surgery, or Angina No filed at 01/22/2025 0836   Hypertension requiring the use of medication  0 filed at 01/22/2025 0836   Peripheral vascular disease 0 filed at 01/22/2025 0836   Impaired sensorium (cognitive impairement or loss, clouding, or delirium) 0 filed at 01/22/2025 0836   TIA or CVA withouy residual deficit 0 filed at 01/22/2025 0836   Cerebrovascular accident with deficit 0 filed at 01/22/2025 0836   Modified Frailty Index Calculator 0 filed at 01/22/2025 0836          CHADS2 Stroke Risk  Current as of 10 minutes ago        N/A 3 to 100%: High Risk   2 to < 3%: Medium Risk   0 to < 2%: Low Risk     Last Change: N/A          This score determines the patient's risk of having a stroke if the patient has atrial fibrillation.        This score is not applicable to this patient. Components are not calculated.          Revised Cardiac Risk Index      Flowsheet Row Pre-Admission Testing from 1/22/2025 in [facility]   High-Risk Surgery (Intraperitoneal, Intrathoracic,Suprainguinal vascular) 0 filed at 01/22/2025 0835   History of ischemic heart disease (History of MI, History of positive exercuse test, Current chest paint considered due to myocardial ischemia, Use of nitrate therapy, ECG with pathological Q Waves) 0 filed at 01/22/2025 0835   History of congestive heart failure (pulmonary edemia, bilateral rales or S3 gallop, Paroxysmal nocturnal dyspnea, CXR showing pulmonary vascular redistribution) 0 filed at 01/22/2025 0835   History of cerebrovascular disease (Prior TIA or stroke) 0 filed at 01/22/2025 0835    Pre-operative insulin treatment 0 filed at 01/22/2025 0835   Pre-operative creatinine>2 mg/dl 0 filed at 01/22/2025 0835   Revised Cardiac Risk Calculator 0 filed at 01/22/2025 0835          Apfel Simplified Score    No data to display       Risk Analysis Index Results This Encounter    No data found in the last 10 encounters.       Stop Bang Score      Flowsheet Row Pre-Admission Testing from 1/22/2025 in [facility] Questionnaire Series Submission from 1/13/2025 in [facility] with Generic Provider Uche   Do you snore loudly? 1 filed at 01/22/2025 0804 1  filed at 01/13/2025 0628   Do you often feel tired or fatigued after your sleep? 0 filed at 01/22/2025 0804 0  filed at 01/13/2025 0628   Has anyone ever observed you stop breathing in your sleep? 0 filed at 01/22/2025 0804 0  filed at 01/13/2025 0628   Do you have or are you being treated for high blood pressure? 0 filed at 01/22/2025 0804 0  filed at 01/13/2025 0628   Recent BMI (Calculated) 28.4 filed at 01/22/2025 0804 28.3  filed at 01/13/2025 0628   Is BMI greater than 35 kg/m2? 0=No filed at 01/22/2025 0804 0=No  filed at 01/13/2025 0628   Age older than 50 years old? 1=Yes filed at 01/22/2025 0804 1=Yes  filed at 01/13/2025 0628   Is your neck circumference greater than 17 inches (Male) or 16 inches (Female)? 0 filed at 01/22/2025 0804 --   Gender - Male 0=No filed at 01/22/2025 0804 0=No  filed at 01/13/2025 0628   STOP-BANG Total Score 2 filed at 01/22/2025 0804 --          Prodigy: High Risk  Total Score: 0          ARISCAT Score for Postoperative Pulmonary Complications      Flowsheet Row Pre-Admission Testing from 1/22/2025 in [facility]   Age Calculated Score 3 filed at 01/22/2025 0836   Preoperative SpO2 0 filed at 01/22/2025 0836   Respiratory infection in the last month Either upper or lower (i.e., URI, bronchitis, pneumonia), with fever and antibiotic treatment 0 filed at 01/22/2025 0836   Preoperative  anemia (Hgb less than 10 g/dl) 0 filed at 01/22/2025 0836   Surgical incision  0 filed at 01/22/2025 0836   Duration of surgery  16 filed at 01/22/2025 0836   Emergency Procedure  0 filed at 01/22/2025 0836   ARISCAT Total Score  19 filed at 01/22/2025 0836          Ngozi Perioperative Risk for Myocardial Infarction or Cardiac Arrest (KELI)      Flowsheet Row Pre-Admission Testing from 1/22/2025 in [facility]   Calculated Age Score 1.08 filed at 01/22/2025 0836   Functional Status  0 filed at 01/22/2025 0836   ASA Class  -3.29 filed at 01/22/2025 0836   Creatinine 0 filed at 01/22/2025 0836   Type of Procedure  0.80 filed at 01/22/2025 0836   KELI Total Score  -6.66 filed at 01/22/2025 0836   KELI % 0.13 filed at 01/22/2025 0836          Assessment & Plan:    Neuro:  No diagnosis or significant findings on chart review or clinical presentation and evaluation.     HEENT/Airway:  No diagnosis or significant findings on chart review or clinical presentation and evaluation.   STOP-BANG Score-3 points moderaterisk for RICHARD    Mallampati::  II    TM distance::  >3 FB    Neck ROM::  Full  Dentures-denies  Crowns-denies  Implants-denies    Cardiovascular:  No diagnosis or significant findings on chart review or clinical presentation and evaluation.   METS: 5.7  RCRI: 0 points, 3.9%  risk for postoperative MACE   KELI: 0.13% risk for postoperative MACE  EKG -normal sinus rhythm Rate-75 No acute changes.     Pulmonary:  No diagnosis or significant findings on chart review or clinical presentation and evaluation.   ARISCAT: <26 points, 1.6% risk of in-hospital postoperative pulmonary complication  PRODIGY: Low risk for opioid induced respiratory depression  Smoking History-She has never smoked.  Discussed smoking cessation and deep breathing handout given    Renal/Urinary:  No diagnosis or significant findings on chart review or clinical presentation and evaluation.    CMP-Pending  Creatinine-  GFR-    Endocrine:  No diagnosis or significant findings on chart review or clinical presentation and evaluation.   [id number]    Hematologic/Immunology:  No diagnosis or significant findings on chart review or clinical presentation and evaluation.  The patient is not a Jehovah’s witness and will accept blood and blood products if medically indicated.   History of previous blood transfusions No  CBC-Pending  HGB-Pending  Caprini Score 7, patient at High for postoperative DVT. Pt supplied education/VTE handout  Anticoagulation use: No     Gastrointestinal:   No diagnosis or significant findings on chart review or clinical presentation and evaluation.   Recreational drug use: alcohol  Alcohol use social drinker    Infectious disease:   No diagnosis or significant findings on chart review or clinical presentation and evaluation.   Prescription provided for CHG body wash and dental rinse. CHG use instructions reviewed and provided to patient.  Staph screen collected-Pending    Musculoskeletal:   No diagnosis or significant findings on chart review or clinical presentation and evaluation.   JHFRAT score-0 points. low risk for falls    Anesthesia:  ASA 2 - Patient with mild systemic disease with no functional limitations  Anticipated anesthesia-Consult  History of General anesthesia- yes  Complications- No anesthesia complications  No family history of anesthesia complications    Abnormalities noted on PAT evaluation: No    Labs & Imaging ordered:  CBC, CMP, HBA1C, MRSA, EKG  Nickel/metal allergy-negative  Shellfish allergy-negative    Overall, patient Low Risk for the scheduled Moderate Risk surgery. Discussed with patient medication instructions, NPO guidelines, and any questions or concerns.     Face to face time spent 45 minutes

## 2025-01-22 NOTE — PREPROCEDURE INSTRUCTIONS
Medication List            Accurate as of January 22, 2025  8:25 AM. Always use your most recent med list.                * chlorhexidine 4 % external liquid  Commonly known as: Hibiclens  Apply topically once daily for 5 days.  Medication Adjustments for Surgery: Take/Use as prescribed     * chlorhexidine 0.12 % solution  Commonly known as: Peridex  Use 15 mL in the mouth or throat once daily for 2 doses. 15 ML night before surgery and 15 ML morning of surgery. Swish and spit  Medication Adjustments for Surgery: Take/Use as prescribed     ibuprofen 800 mg tablet  Additional Medication Adjustments for Surgery: Take last dose 7 days before surgery  Notes to patient: Last dose preoperatively 2/4/2025     multivitamin tablet  Additional Medication Adjustments for Surgery: Take last dose 7 days before surgery  Notes to patient: Last dose preoperatively 2/4/2025     semaglutide  Per Buderer protocol inject 0.3mg subcutaneously once a week for 4 weeks, then increase to 0.6mg once a week.  Additional Medication Adjustments for Surgery: Take last dose 7 days before surgery  Notes to patient: Last dose preoperatively 2/4/2025     sertraline 50 mg tablet  Commonly known as: Zoloft  Take 1 tablet (50 mg) by mouth once daily.  Medication Adjustments for Surgery: Take/Use as prescribed           * This list has 2 medication(s) that are the same as other medications prescribed for you. Read the directions carefully, and ask your doctor or other care provider to review them with you.                NPO Instructions:     Do not eat any food or drink liquid after midnight the night before your surgery/procedure.  Additional Instructions:      Seven/Six Days before Surgery:  Review your medication instructions, stop indicated medications  Five Days before Surgery:  Review your medication instructions, stop indicated medications  Begin using your Hibiclens  Three Days before Surgery:  Review your medication instructions, stop  indicated medications  The Day before Surgery:  No smoking or alcohol use 24 hours before surgery  Start using 0.12% CHG mouthwash  Review your medication instructions, stop indicated medications  You will be contacted regarding the time of your arrival to facility and surgery time  Do not eat any food after Midnight  Day of Surgery:  Review your medication instructions, take indicated medications  If you have diabetes, please check your fasting blood sugar upon awakening.  If fasting blood sugar is <80 mg/dl, drink 100 ml of apple juice, time limit of 2 hours before  Wear  comfortable loose fitting clothing  Do not use moisturizers, creams, lotions or perfume  All jewelry and valuables should be left at home     CONTACT SURGEON'S OFFICE IF YOU DEVELOP:  * Fever = 100.4 F   * New respiratory symptoms (e.g. cough, shortness of breath, respiratory distress, sore throat)  * Recent loss of taste or smell  *Flu like symptoms such as headache, fatigue or gastrointestinal symptoms  * You develop any open sores, shingles, burning or painful urination   AND/OR:  * You no longer wish to have the surgery.  * Any other personal circumstances change that may lead to the need to cancel or defer this surgery.  *You were admitted to any hospital within one week of your planned procedure.     SMOKING:  *Quitting smoking can make a huge difference to your health and recovery from surgery.    *If you need help with quitting, call 8-785-QUIT-NOW.     THE DAY BEFORE SURGERY:  *Do not eat any food after midnight the night before your surgery.   SURGICAL TIME:  *You will be contacted between 2 p.m. and 3 p.m. the business day before your surgery with your arrival time.  *If you haven't received a call by 3pm, call [phone]  *Scheduled surgery times may change and you will be notified if this occurs-check your personal voicemail for any updates.     ON THE MORNING OF SURGERY:  *Wear comfortable, loose fitting clothing.   *Do not use  moisturizers, creams, lotions or perfume.  *All jewelry and valuables should be left at home.  *Prosthetic devices such as contact lenses, hearing aids, dentures, eyelash extensions, hairpins and body piercing must be removed before surgery.     BRING WITH YOU:  *Photo ID and insurance card  *Current list of medications and allergies  *Pacemaker/Defibrillator/Heart stent cards  *CPAP machine and mask  *Slings/splints/crutches  *Copy of your complete Advanced Directive/DHPOA-if applicable  *Neurostimulator implant remote     PARKING AND ARRIVAL:  *Check in at the Main Entrance desk and let them know you are here for surgery.     IF YOU ARE HAVING OUTPATIENT/SAME DAY SURGERY:  *A responsible adult MUST accompany you at the time of discharge and stay with you for 24 hours after your surgery.  *You may NOT drive yourself home after surgery.  *You may use a taxi or ride sharing service ([employer], Uber) to return home ONLY if you are accompanied by a friend or family member.  *Instructions for resuming your medications will be provided by your surgeon.     Thank you for coming to Pre Admission testing.      If I have prescribed medication please don't forget to  at your pharmacy.      Any questions about today's visit call [phone] and leave a message in the general mailbox.     Patient instructed to ambulate as soon as possible postoperatively to decrease thromboembolic risk.     Theo Braga, APRN-CNP     Thank you for visiting [facility].  If you have any changes to your health condition, please call the surgeons office to alert them and give them details of your symptoms.        Preoperative Fasting Guidelines     Why must I stop eating and drinking near surgery time?  With sedation, food or liquid in your stomach can enter your lungs causing serious complications  Increases nausea and vomiting     When do I need to stop eating and drinking before my surgery?  Do not eat any food  after midnight the night before your surgery/procedure.        Additional Instructions:      The Day before Surgery:  -Review your medication instructions, stop indicated medications  -You will be contacted in the evening regarding the time of your arrival to facility and surgery time     Day of Surgery:  -Review your medication instructions, take indicated medications  -Wear comfortable loose fitting clothing  -Do not use moisturizers, creams, lotions or perfume  -All jewelry and valuables should be left at home                   Preoperative Brain Exercises     What are brain exercises?  A brain exercise is any activity that engages your thinking (cognitive) skills.     What types of activities are considered brain exercises?  Jigsaw puzzles, crossword puzzles, word jumble, memory games, word search, and many more.  Many can be found free online or on your phone via a mobile bright.     Why should I do brain exercises before my surgery?  More recent research has shown brain exercise before surgery can lower the risk of postoperative delirium (confusion) which can be especially important for older adults.  Patients who did brain exercises for 5 to 10 hours the days before surgery, cut their risk of postoperative delirium in half up to 1 week after surgery.                         [facility]     Preoperative Deep Breathing Exercises     Why it is important to do deep breathing exercises before my surgery?  Deep breathing exercises strengthen your breathing muscles.  This helps you to recover after your surgery and decreases the chance of breathing complications.        How are the deep breathing exercises done?  Sit straight with your back supported.  Breathe in deeply and slowly through your nose. Your lower rib cage should expand and your abdomen may move forward.  Hold that breath for 3 to 5 seconds.  Breathe out through pursed lips, slowly and completely.  Rest and repeat 10 times every  hour while awake.  Rest longer if you become dizzy or lightheaded.                      [facility]     Preoperative Deep Breathing Exercises     Why it is important to do deep breathing exercises before my surgery?  Deep breathing exercises strengthen your breathing muscles.  This helps you to recover after your surgery and decreases the chance of breathing complications.        How are the deep breathing exercises done?  Sit straight with your back supported.  Breathe in deeply and slowly through your nose. Your lower rib cage should expand and your abdomen may move forward.  Hold that breath for 3 to 5 seconds.  Breathe out through pursed lips, slowly and completely.  Rest and repeat 10 times every hour while awake.  Rest longer if you become dizzy or lightheaded.        Patient Information: Incentive Spirometer  What is an incentive spirometer?  An incentive spirometer is a device used before and after surgery to “exercise” your lungs.  It helps you to take deeper breaths to expand your lungs.  Below is an example of a basic incentive spirometer.  The device you receive may differ slightly but they all function the same.    Why do I need to use an incentive spirometer?  Using your incentive spirometer prepares your lungs for surgery and helps prevent lung problems after surgery.  How do I use my incentive spirometer?  When you're using your incentive spirometer, make sure to breathe through your mouth. If you breathe through your nose, the incentive spirometer won't work properly. You can hold your nose if you have trouble.  If you feel dizzy at any time, stop and rest. Try again at a later time.  Follow the steps below:  Set up your incentive spirometer, expand the flexible tubing and connect to the outlet.  Sit upright in a chair or bed. Hold the incentive spirometer at eye level.   Put the mouthpiece in your mouth and close your lips tightly around it. Slowly breathe out (exhale)  completely.  Breathe in (inhale) slowly through your mouth as deeply as you can. As you take a breath, you will see the piston rise inside the large column. While the piston rises, the indicator should move upwards. It should stay in between the 2 arrows (see Figure).  Try to get the piston as high as you can, while keeping the indicator between the arrows.   If the indicator doesn't stay between the arrows, you're breathing either too fast or too slow.  When you get it as high as you can, hold your breath for 10 seconds, or as long as possible. While you're holding your breath, the piston will slowly fall to the base of the spirometer.  Once the piston reaches the bottom of the spirometer, breathe out slowly through your mouth. Rest for a few seconds.  Repeat 10 times. Try to get the piston to the same level with each breath.  Repeat every hour while awake  You can carefully clean the outside of the mouthpiece with an alcohol wipe or soap and water.       Patient and Family Education             Ways You Can Help Prevent Blood Clots                    This handout explains some simple things you can do to help prevent blood clots.      Blood clots are blockages that can form in the body's veins. When a blood clot forms in your deep veins, it may be called a deep vein thrombosis, or DVT for short. Blood clots can happen in any part of the body where blood flows, but they are most common in the arms and legs. If a piece of a blood clot breaks free and travels to the lungs, it is called a pulmonary embolus (PE). A PE can be a very serious problem.         Being in the hospital or having surgery can raise your chances of getting a blood clot because you may not be well enough to move around as much as you normally do.         Ways you can help prevent blood clots in the hospital           Wearing SCDs. SCDs stands for Sequential Compression Devices.   SCDs are special sleeves that wrap around your legs  They attach to  a pump that fills them with air to gently squeeze your legs every few minutes.   This helps return the blood in your legs to your heart.   SCDs should only be taken off when walking or bathing.   SCDs may not be comfortable, but they can help save your life.                                            Wearing compression stockings - if your doctor orders them. These special snug fitting stockings gently squeeze your legs to help blood flow.       Walking. Walking helps move the blood in your legs.   If your doctor says it is ok, try walking the halls at least   5 times a day. Ask us to help you get up, so you don't fall.      Taking any blood thinning medicines your doctor orders.        Page 1 of 2            [facility]; 3/23   Ways you can help prevent blood clots at home         Wearing compression stockings - if your doctor orders them. ? Walking - to help move the blood in your legs.       Taking any blood thinning medicines your doctor orders.      Signs of a blood clot or PE        Tell your doctor or nurse know right away if you have of the problems listed below.    If you are at home, seek medical care right away. Call 911 for chest pain or problems breathing.          Signs of a blood clot (DVT) - such as pain,  swelling, redness or warmth in your arm or leg      Signs of a pulmonary embolism (PE) - such as chest pain or feeling short of breath    Patient Information: Pre-Operative Infection Prevention Measures     Why did I have my nose, under my arms, and groin swabbed?  The purpose of the swab is to identify Staphylococcus aureus inside your nose or on your skin.  The swab was sent to the laboratory for culture.  A positive swab/culture for Staphylococcus aureus is called colonization or carriage.      What is Staphylococcus aureus?  Staphylococcus aureus, also known as “staph”, is a germ found on the skin or in the nose of healthy people.  Sometimes Staphylococcus aureus can get into the body  and cause an infection.  This can be minor (such as pimples, boils, or other skin problems).  It might also be serious (such as a blood infection, pneumonia, or a surgical site infection).    What is Staphylococcus aureus colonization or carriage?  Colonization or carriage means that a person has the germ but is not sick from it.  These bacteria can be spread on the hands or when breathing or sneezing.    How is Staphylococcus aureus spread?  It is most often spread by close contact with a person or item that carries it.    What happens if my culture is positive for Staphylococcus aureus?  Your doctor/medical team will use this information to guide any antibiotic treatment which may be necessary.  Regardless of the culture results, we will clean the inside of your nose with a betadine swab just before you have your surgery.      Will I get an infection if I have Staphylococcus aureus in my nose or on my skin?  Anyone can get an infection with Staphylococcus aureus.  However, the best way to reduce your risk of infection is to follow the instructions provided to you for the use of your CHG soap and dental rinse.        Patient Information: Oral/Dental Rinse    What is oral/dental rinse?   It is a mouthwash. It is a way of cleaning the mouth with a germ-killing solution before your surgery.  The solution contains chlorhexidine, commonly known as CHG.   It is used inside the mouth to kill a bacteria known as Staphylococcus aureus.  Let your doctor know if you are allergic to Chlorhexidine.    Why do I need to use CHG oral/dental rinse?  The CHG oral/dental rinse helps to kill a bacteria in your mouth known as Staphylococcus aureus.     This reduces the risk of infection at the surgical site.      Using your CHG oral/dental rinse  STEPS:  Use your CHG oral/dental rinse after you brush your teeth the night before (at bedtime) and the morning of your surgery.  Follow all directions on your prescription label.    Use the  cap on the container to measure 15ml   Swish (gargle if you can) the mouthwash in your mouth for at least 30 seconds, (do not swallow) and spit out  After you use your CHG rinse, do not rinse your mouth with water, drink or eat.  Please refer to the prescription label for the appropriate time to resume oral intake      What side effects might I have using the CHG oral/dental rinse?  CHG rinse will stick to plaque on the teeth.  Brush and floss just before use.  Teeth brushing will help avoid staining of plaque during use.    We have sent a prescription for CHG 0.12% mouthwash to your preferred pharmacy.  If you have not already, Please  your prescription and start using five days before surgery.  Follow the instruction sheet provided to you at your CPM/PAT appointment.  Please contact [facility] PAT if you do not receive your CHG mouthwash prescription.      Patient Information: Home Preoperative Antibacterial Shower      What is a home preoperative antibacterial shower?  This shower is a way of cleaning the skin with a germ-killing solution before surgery.  The solution contains chlorhexidine, commonly known as CHG.  CHG is a skin cleanser with germ-killing ability.  Let your doctor know if you are allergic to chlorhexidine.    Why do I need to take a preoperative antibacterial shower?  Skin is not sterile.  It is best to try to make your skin as free of germs as possible before surgery.  Proper cleansing with a germ-killing soap before surgery can lower the number of germs on your skin.  This helps to reduce the risk of infection at the surgical site.  Following the instructions listed below will help you prepare your skin for surgery.      How do I use the solution?  Steps:  Begin using your CHG soap 5 days before your scheduled surgery on 2/7/2025.    First, wash and rinse your hair using the CHG soap. Keep CHG soap away from ear canals and eyes.  Rinse completely, do not condition.  Hair extensions should be  removed.  Wash your face with your normal soap and rinse.    Apply the CHG solution to a clean wet washcloth.  Turn the water off or move away from the water spray to avoid premature rinsing of the CHG soap as you are applying.   Firmly lather your entire body from the neck down.  Do not use on your face.  Pay special attention to the area(s) where your incision(s) will be located unless they are on your face.  Avoid scrubbing your skin too hard.  The important point is to have the CHG soap sit on your skin for 3 minutes.    When the 3 minutes are up, turn on the water and rinse the CHG solution off your body completely.   DO NOT wash with regular soap after you have used the CHG soap solution  Pat yourself dry with a clean, freshly-laundered towel.  DO NOT apply powders, deodorants, or lotions.  Dress in clean, freshly laundered nightclothes.    Be sure to sleep with clean, freshly laundered sheets.  Be aware that CHG will cause stains on fabrics; if you wash them with bleach after use.  Rinse your washcloth and other linens that have contact with CHG completely.  Use only non-chlorine detergents to launder the items used.   The morning of surgery is the fifth day.  Repeat the above steps and dress in clean comfortable clothing     Whom should I contact if I have any questions regarding the use of CHG soap?  Call the [facility] at [phone] if you have any questions.

## 2025-01-23 ENCOUNTER — APPOINTMENT (OUTPATIENT)
Dept: PRIMARY CARE | Facility: CLINIC | Age: 55
End: 2025-01-23
Payer: COMMERCIAL

## 2025-01-24 LAB — STAPHYLOCOCCUS SPEC CULT: ABNORMAL

## 2025-01-29 ENCOUNTER — APPOINTMENT (OUTPATIENT)
Dept: PRIMARY CARE | Facility: CLINIC | Age: 55
End: 2025-01-29
Payer: COMMERCIAL

## 2025-01-30 ENCOUNTER — APPOINTMENT (OUTPATIENT)
Dept: ENDOCRINOLOGY | Facility: CLINIC | Age: 55
End: 2025-01-30
Payer: COMMERCIAL

## 2025-01-30 VITALS — BODY MASS INDEX: 28.28 KG/M2 | WEIGHT: 176 LBS | HEIGHT: 66 IN

## 2025-01-30 DIAGNOSIS — Z71.3 DIETARY COUNSELING: Primary | ICD-10-CM

## 2025-01-30 PROCEDURE — 97802 MEDICAL NUTRITION INDIV IN: CPT | Performed by: DIETITIAN, REGISTERED

## 2025-01-30 NOTE — PATIENT INSTRUCTIONS
- Please refer to your book entitled: Your Mediterranean Meal Plan, and follow Mediterranean Diet eating guidelines as reviewed.  - Please aim for a source of healthy protein and fiber rich foods at meals as discussed for nutrition needs as well as to help provide better satiety at meals.   - Aim for no less than 15 grams of protein at meals and no more than 30 grams at meals as discussed.   - Consider pre-planning healthy meals for the week. Refer to your book for both menu and recipe ideas to get you started.  - Consider tracking daily food intake for accountability with food choices and portions and aim for 1300 calories daily.  - Aim for 48 ounces of water daily and please consider replacing your flavored creamer with Fairlife protein as an alternative.  - Keep up with weekly exercise per medical recommendations.  - Follow-up as scheduled for the group classes with Dr. Vicente Tapia.

## 2025-01-30 NOTE — PROGRESS NOTES
"Initial Nutrition Assessment    Patient was referred to nutrition by Dr. Vicente Tapia for weight management/desire to lose weight. Other PMHX significant for HLD and Hyperparathyroidism. Pertinent labs reviewed which show Triglycerides of 222 mg/dL.     Diet recall reveals a consistent meal pattern with rare missed meals; however, inconsistent intakes of protein rich foods as well as some reported calorically dense foods all likely contributing to some slowing of weight loss efforts. Fluids mostly meeting recommendations in type and amount with water as primary beverage; however, consuming an increased amount of flavored creamer in coffee which is further likely contributing to slowing of weight loss endeavors. Pt is incorporating consistent physical activity, meeting weekly recommendations set forth by medical providers leading into knee surgery. See all interventions/recommendations below as discussed during visit this day.     Patient reported symptoms: Difficulty losing weight    Anthropometrics:  Height:   Ht Readings from Last 1 Encounters:   01/22/25 1.676 m (5' 6\")      Weight:   Wt Readings from Last 10 Encounters:   01/22/25 79.8 kg (176 lb)   01/02/25 79.4 kg (175 lb)   11/19/24 82.1 kg (181 lb)   09/06/24 82.1 kg (181 lb)   06/26/24 81.6 kg (180 lb)   06/06/24 79.1 kg (174 lb 6.1 oz)   04/17/24 82.3 kg (181 lb 8 oz)   03/20/24 80.7 kg (178 lb)   03/07/24 81.6 kg (180 lb)   02/07/24 81.6 kg (179 lb 14.3 oz)      Current BMI:   BMI Readings from Last 1 Encounters:   01/22/25 28.41 kg/m²        Labs:  Lab Results   Component Value Date    HGBA1C 5.1 01/22/2025      Lab Results   Component Value Date    CHOL 171 03/06/2024    CHOL 153 07/12/2022    CHOL 174 04/26/2021     Lab Results   Component Value Date    HDL 31.9 03/06/2024    HDL 38.5 (A) 07/12/2022    HDL 38.1 (A) 04/26/2021     Lab Results   Component Value Date    LDLCALC 95 03/06/2024     Lab Results   Component Value Date    TRIG 222 (H) " 03/06/2024    TRIG 186 (H) 07/12/2022    TRIG 188 (H) 04/26/2021       Malnutrition Screening:  Significant Unintentional weight loss: No  Eating less than 75% of usual intake for more than 2 weeks: No  Potential Signs of Inflammation: No    Recommended Malnutrition Diagnosis: No malnutrition identified    Diet Recall-  Breakfast (11-12 PM)- English muffin with egg beaters and veggies and a serving of berries  Lunch (3 PM)- salads with croutons and dried cranberries and nuts and Ranch dressing (no protein most often or occasionally has a HB egg on top of the salad)  Dinner- pizza OR Chipotle OR chicken enchiladas with CHO balance wraps and rotisserie chicken and cheese and various vegetables and light sour cream OR pasta dishes   Daily Snacks- mini chocolate covered pretzels in a single serve bag OR almonds   Beverages- coffee (3 cups) daily with flavored creamer and protein powder, water throughout the day and a diet soda daily   Alcohol- once weekly socially  Physical Activity- exercises per PT for knees and stretching     Other pertinent patient reported Information:  - Medication Semaglutide started in November and has had a successful 7 lbs weight loss to date and with positive appetite suppression.  - Having knee surgery in 2 weeks and wants to learn about healthy eating to support both weight loss and recovery following surgery.  - Has had an emotional past two months (holidays as well as lost her dad) and admits to getting off track some with healthy eating as a result.     Nutrition Diagnosis: Food and nutrition-related knowledge deficit related to lack of recent exposure to information as evidenced by BMI above normative standard for age and gender.    Readiness to Learn:  Cognitive ability: Alert and oriented  Motivation to learn: Interested  Family Support: Unable to assess- family not present  Instruction provided to: Patient  Patient learns best by: Multiple methods  Factors affecting learning: None    Physical limitations affecting learning: None    Education Materials Provided:   Your Mediterranean Meal Plan Booklet    Nutrition Interventions/Recommendations for 1/30/2025:  - Please refer to your book entitled: Your Mediterranean Meal Plan, and follow Mediterranean Diet eating guidelines as reviewed.  - Please aim for a source of healthy protein and fiber rich foods at meals as discussed for nutrition needs as well as to help provide better satiety at meals.   - Aim for no less than 15 grams of protein at meals and no more than 30 grams at meals as discussed.   - Consider pre-planning healthy meals for the week. Refer to your book for both menu and recipe ideas to get you started.  - Consider tracking daily food intake for accountability with food choices and portions and aim for 1300 calories daily.  - Aim for 48 ounces of water daily and please consider replacing your flavored creamer with Fairlife protein as an alternative.  - Keep up with weekly exercise per medical recommendations.  - Follow-up as scheduled for the group classes with Dr. Vicente Tapia.     Nutrition Monitoring & Evaluation: adherence to recommendations and patient stated goals    Need for follow-up: As scheduled for Dr. Butcher's Shared Medical Appointment (SMA)    Referred by: Dr. Vicente Tapia    MNT Billing Type: Medical Nutrition Assessment, each 15 min increment, for 3 increments.    SIGNATURE:   Eryn Robb RD, VIVIAN, LD, [facility]                                                        DATE:   1/30/2025

## 2025-02-10 LAB
AP SUMMARY REPORT: NORMAL
SCAN RESULT: NORMAL

## 2025-02-11 ENCOUNTER — ANESTHESIA EVENT (OUTPATIENT)
Dept: OPERATING ROOM | Facility: HOSPITAL | Age: 55
End: 2025-02-11
Payer: COMMERCIAL

## 2025-02-11 PROBLEM — Z96.652 S/P TOTAL KNEE ARTHROPLASTY, LEFT: Status: ACTIVE | Noted: 2025-02-11

## 2025-02-11 RX ORDER — TRAMADOL HYDROCHLORIDE 50 MG/1
50 TABLET ORAL EVERY 6 HOURS PRN
Qty: 28 TABLET | Refills: 0 | Status: SHIPPED | OUTPATIENT
Start: 2025-02-11 | End: 2025-02-19

## 2025-02-11 RX ORDER — POLYETHYLENE GLYCOL 3350 17 G/17G
17 POWDER, FOR SOLUTION ORAL DAILY
Qty: 510 G | Refills: 0 | Status: SHIPPED | OUTPATIENT
Start: 2025-02-11

## 2025-02-11 RX ORDER — DOCUSATE SODIUM 100 MG/1
100 CAPSULE, LIQUID FILLED ORAL 2 TIMES DAILY
Qty: 60 CAPSULE | Refills: 0 | Status: SHIPPED | OUTPATIENT
Start: 2025-02-11 | End: 2025-03-14

## 2025-02-11 RX ORDER — PANTOPRAZOLE SODIUM 40 MG/1
40 TABLET, DELAYED RELEASE ORAL DAILY
Qty: 30 TABLET | Refills: 0 | Status: SHIPPED | OUTPATIENT
Start: 2025-02-11 | End: 2025-03-14

## 2025-02-11 RX ORDER — ACETAMINOPHEN 500 MG
1000 TABLET ORAL EVERY 6 HOURS PRN
Qty: 240 TABLET | Refills: 0 | Status: SHIPPED | OUTPATIENT
Start: 2025-02-11 | End: 2025-03-13

## 2025-02-11 RX ORDER — ASPIRIN 81 MG/1
81 TABLET ORAL 2 TIMES DAILY
Qty: 60 TABLET | Refills: 0 | Status: SHIPPED | OUTPATIENT
Start: 2025-02-11 | End: 2025-03-14

## 2025-02-11 RX ORDER — OXYCODONE HYDROCHLORIDE 5 MG/1
5 TABLET ORAL EVERY 6 HOURS PRN
Qty: 28 TABLET | Refills: 0 | Status: SHIPPED | OUTPATIENT
Start: 2025-02-11 | End: 2025-02-19

## 2025-02-11 RX ORDER — MELOXICAM 15 MG/1
15 TABLET ORAL DAILY
Qty: 30 TABLET | Refills: 0 | Status: SHIPPED | OUTPATIENT
Start: 2025-02-11 | End: 2025-03-14

## 2025-02-11 NOTE — DISCHARGE INSTRUCTIONS
MD Fiorella Winter MPAS, APRIL, ATC  Adult Reconstruction and Joint Replacement Surgery  Phone: [phone]     Fax: [phone]        DISCHARGE INSTRUCTIONS      PLEASE READ CAREFULLY BEFORE CONTACTING YOUR PROVIDER.    WE WORK COLLABORATIVELY AS A TEAM. CALLING MULTIPLE STAFF MEMBERS REGARDING THE SAME ISSUE WILL DELAY YOUR CARE.    [facility] IS THE PREFERRED COMMUNICATION FOR ALL TEAM MEMBERS.    POSTOPERATIVE INSTRUCTIONS: TOTAL HIP & TOTAL KNEE ARTHROPLASTY    JOINT CARE TEAM  Please use the information below to contact your care team following surgery.  If you are leaving a message or using the [facility] Chart portal, please include your full name, date of birth and date of surgery so that we can correctly identify you.  Your call will be returned within 1-2 business days, please do not leave multiple messages with other staff regarding a single issue while you are awaiting a return call.     Who to call Contact Information Matters needing handled   Fiorella Rodriguez PA-C  Physician Assistant [facility] [employer] [facility]   Medical questions/concerns       Byron Harrison-    [email or web address]           [phone]  [address]    [phone] fax  [phone]         Scheduling office Visits  Medical questions/concerns  Leave of Absence or other paperwork  Any concerns more than 6 weeks from surgery - an appointment will need to be made   Trista Melgar MBA, BSN, RN-BC  Ortho Nurse Navigator [place]        __________________________________    Theo Alva RN, BSN  Ortho Coordinator [facility] VALLESN-BC  Ortho Nurse Navigator [facility]       [phone] Please call staff at the institution in which you had surgery for prescription refills        Prescription Refills  Nursing, medical question related questions or concerns within 6 weeks of surgery   Orders for Outpatient Physical Therapy             MEDICATION  REFILLS - MyChart or Nurse Navigator (Above) at the institution in which you had surgery. [facility] or [facility].    -You will NOT receive a call indicating that your prescription has been filled.  Please contact your pharmacy with any questions.    Medication refills will be filled Monday-Friday 7am to 1pm ONLY. Please call the nurse navigator office or send a [email or web address] message for a refill request.  Any requests received outside of this timeframe will be handled on the next business day.  Please do not call multiple times or call other members of the care team for medication needs, this will cause the refill to take longer.    Per State and Institutional policy, pain medications can only be refilled every 7 days for up to six weeks following surgery.    My Chart Portal: If you are using the My Chart portal and are requesting a medication refill, please list what type of surgery you had and left or right side, medication that needs refilled, and pharmacy you would like your medication sent.     WEIGHT BEARING- weight bearing as tolerated to operative extremity     ACTIVITY-As Tolerated    DRIVING & TRAVEL AFTER SURGERY   Patients should anticipate waiting at least 4-6 weeks before traveling long distances after surgery.  You will need to stop to walk around ever 1 hour during your travel to help with blood clot prevention.    Patients may not drive until cleared by the joint nurse or the office and you are off of all narcotics.    DENTAL PROCEDURES & CLEANINGS  You must wait a minimum of 3 months for elective dental appointments after a total joint replacement, including routine cleanings or dental work including bridges, crowns, extractions, etc.. Unless, it is an emergency. You will need a prophylactic antibiotic lifelong prior to any dental visit, cleaning or procedure. Your surgeon's office or your dentist may provide a prescription antibiotic. Antibiotics are a lifelong need before dental appointments.      You  do not need antibiotics for endoscopic procedures such as colonoscopy or EGD, dermatologic biopsies or eye surgeries.    WOUND CARE  If you experience continued drainage or bleeding, you may cover with abdominal/Maxi pads (purchase at local drug store).  Knee replacements should wrap with an ace wrap.  You may shower with waterproof dressing on. Your surgical bandage will be removed by your home therapist 1 week after surgery. If you have staples intact, home care will remove in 2 weeks. If you have sutures intact, you will need to return to the office in 2 weeks for suture removal. Once the dressing is removed by home care, you may continue to shower. Let soap and water wash over the wound. DO NOT SCRUB.  Steri-strips under the bandage will remain in place until they fall off on their own.  If they are loose, you may gently remove.  If they have not fallen off in two weeks, gently peel them off. Do not remove if pulling causes resistance against the incision.  You will see suture tails sticking out of the ends of the incision.  DO NOT CUT THEM.  They will fall off when the sutures dissolve.  If they are bothersome, cover with a band aid.  Do not soak in a bath tub, hot tub, pool or lake until you are 8 weeks out from surgery.  Do not apply lotions, creams or ointments until you are 6 weeks out from surgery,    PAIN, SWELLING, BRUISING & CLICKING  Pain and swelling are a natural part of your recovery which is considered normal for up to a year after surgery.  Symptoms may be treated with movement, ice, compression stockings, elevating your leg, and by following the pain medication regimen as prescribed.  Bruising is normal for several weeks after surgery. You may also have leg swelling and pain in your shin.  You may ice areas that are tender to help with discomfort.  You are required to wear the provided compression stockings, every day, for 4 weeks following surgery.  Remove the stockings at night and place them  back on in the morning.  Pain and swelling may temporarily increase with an increase in activity or exercise.  Use ice after activity.  Audible clicking with movement or exercises is considered normal following joint replacement.  If this persists at 6 months or 1 year, please notify your surgeon.  You may also feel decreased sensation or numbness near the incision site.  This is normal and sensation may or may not return.    PERSONAL HYGIENE  You may shower upon discharging from the hospital.  Soap and water is permitted to run over the surgical dressing, steri-strips and incision.  Do not scrub directly over these items.  DO NOT soak your incision in a bath, hot tub, pool or pond/lake for a minimum of 8 weeks following your surgery.  DO NOT use lotions, creams, ointments on your wound for a minimum of 6 weeks following your surgery. At that time you may use vitamin E to assist with softening of your incision.      RESTARTING HOME ROUTINE - DIET & MEDICATIONS  Post-operative constipation can result due to a combination of inactivity, anesthesia and pain medication. To help prevent this, you should increase your water and fiber intake. Physical activity such as walking will also help stimulate the bowels.   You may resume your normal diet when you discharge home.    To avoid constipation, choose foods that help promote good bowel habits, such as foods high in fiber.  You may restart your home medications the following day after your surgery UNLESS you have been given alternate instructions.  Follow the instructions given to you on your hospital discharge instructions for more information regarding your home medications.  IF YOU EXPERIENCE NAUSEA OR DIARRHEA, FOLLOW THE B.R.A.T. DIET UNTIL SYMPTOMS RESOLVE.  If you are experiencing vomiting that lasts more than 24 hours, please contact your joint nurse.      IN-HOME PHYSICAL THERAPY & OUTPATIENT PHYSICAL THERAPY  In-home physical therapy will start 1-2 days after you  get home from the hospital.    The home care agency will call within the first 24-48 hours to set up their first visit.  Please do not call your care team to inquire during this timeframe.  Continue the exercises you were given in the hospital until you have been seen by in-home therapy.  Make sure to provide a phone number with the ability for the home care staff to leave a message if you do not answer your phone.    Outpatient physical therapy following knee replacement surgery should begin 2-3 weeks after surgery.  You will be given physical therapy order prior to discharge from the hospital. You should call to schedule this appointment ASAP if not already scheduled before surgery.  Waiting until you are ready for outpatient physical therapy will cause a delay in your care.  You may choose any outpatient physical therapy location.      EMERGENCIES - WHEN TO CONTACT THE SURGEON'S OFFICE IMMEDIATELY  Fever >101 with chills that has been present for at least 48 hours.   Excessive bleeding from incision that will not slow down. A small amount of drainage is normal and expected.  Once pressure is applied and the area is covered, do not continue to check the area regularly.  This will remove pressure and bleeding will continue.  Leave in place for 4-6 hours.  Signs of infection of incision-excessive drainage that is soaking through your dressing (especially if it is pus-like), redness that is spreading out from the edges of your incision, or increased warmth around the area.  Excruciating pain for which the pain medication, taken as instructed, is not helping.  Severe calf pain.  Go directly to the emergency room or call 911, if you are experiencing chest pain or difficulty breathing.    After Hour and Weekend Emergency Answering Service [phone]    ICE & COLD THERAPY INSTRUCTIONS    To assist with pain control and post-op swelling, you should be using ice regularly throughout recovery, especially for the first 6  weeks, regardless of the cold therapy method you use.      Always make sure there is a layer of protection between the cold pad and your skin.    If you are using ICE PACKS or GEL PACKS, you will need to alternate 20 minutes on, 20 minutes off twice per hour.    If you are using an ICE MACHINE, please follow the provided ice machine instructions.  These devices differ from ice or ice packs whereas the mechanism circulates water through tubing and a pad to provide longer periods of cold therapy to the desired site.  You can use your cold devices around the clock for optimal comfort.  We recommend using cold therapy after working with therapy or completing exercises on your own.  There is no set schedule in which you must follow while using cold therapy.  Below are a few points to remember when using a cold therapy device:    You do not need to need to use the 20 on, 20 off method.  Detach the pad from the cooler and ambulate at least once every hour.  You can check your skin under the pad at this time.  You may wear the cold therapy device during periods of sleep including overnight.  If you wake up during the night, you can check the skin at this time.  You do not need to wake up specifically to perform skin checks.  Empty the cooler and pad when device is not in use.  Follow 's instructions for cleaning your cold therapy device.    DISCHARGE MEDICATIONS - Please reference the sample schedule on the reverse side for instructions on how to best schedule medications.    PAIN MEDICATION    ___X_ Tramadol / Oxycodone  Tramadol and Oxycodone have been prescribed for post-operative pain control.    These medications will only be refilled ONCE every 7 days for a period of up to 6 weeks following surgery.  After 6 weeks, you will transition to acetaminophen and over -the- counter anti-inflammatories such as Ibuprofen, Advil or Aleve in conjunction with ICE/COLD THERAPY.   Side effects may be constipation and  nausea, vomiting, sleepiness, dizziness, lightheadedness, headache, blurred vision, dry mouth sweating, itching (if you have itching, over-the -counter Benadryl can be used as needed).  You may NOT operate a motor vehicle while taking these medications or have been cleared by your care team.     ___X_ Acetaminophen (Tylenol)  Acetaminophen has been prescribed as an adjunct for pain control. Take two 500 mg tablets every 6 hours for 4 weeks. You will not receive a refill on this medication.  Do not exceed 4000mg of acetaminophen within a 24 hour period.  Side effects may include nausea, heartburn, drowsiness, and headache.    ___X_ Meloxicam (Mobic)-Meloxicam has been prescribed as an adjunct anti-inflammatory to assist in pain control.    Take one 15mg tablet once daily for 4 weeks.  You will not receive refills on this medication.   Side effects may include nausea.  May not be prescribed if you are on a more potent blood thinner than aspirin or have chronic kidney disease.    BLOOD THINNER    ___X_ Blood Thinner   A blood thinner has been prescribed to prevent blood clots in your leg or lungs. Take as prescribed on the bottle for 4 weeks. You will not receive a refill on this medication.    ANTI NAUSEA    ___X_ Proton Pump Inhibitor (PPI)-Stomach Acid Reduction Medication  If you are already on a PPI, you will continue your regular medication. If you are not, you will be prescribed Pantoprazole to help with nausea and protect your stomach while taking pain medication.  You will not receive a refill on this medication.    STOOL SOFTENERS    ___X_ Colace (Docusate Sodium) & Miralax (polyethylene glycol)  Take both medications to help with constipation while using the Oxycodone and Tramadol for pain control.  You will not receive a refill on this medication.    Continued Constipation  If you continue to be constipated despite daily use of Miralax and Colace, you try an over-the-counter Dulcolax Suppository and use per  instructions on the package.       You will not receive refills on the following medications.   Acetaminophen (Tylenol)  Meloxicam  Miralax  Colace  Proton Pump Inhibitor (PPI)  Blood Thinner    Pain Medication Refills - Ortho Nurse Navigator or [place]- Monday through Friday 7am-1pm    FOLLOW-UP- You should have an appointment with either Dr. Marques or JEANINE Nobles in 6 weeks.         SAMPLE              The times below are an example of how to organize medications to optimize pain control  Your actual medication schedule may vary based on your last dose taken IN THE HOSPITAL      Time 3:00 am 6:00 am 9:00 am 12:00 pm 3:00 pm 6:00 pm 9:00 pm 12:00 am   Medications Tramadol Tylenol  Oxycodone  Miralax   Blood Thinner  Colace  Pantoprazole or other PPI  Tramadol  Meloxicam Tylenol  Oxycodone Tramadol Tylenol  Oxycodone  Miralax Blood Thinner  Colace  Tramadol   Tylenol  Oxycodone            You may begin to wean off the pain medication as your pain remains controlled with increased activity.  The schedules provided are meant to serve as an example.  You may wean off based on your pain control.  Please note that pain medications are not filled beyond 6 weeks after surgery.              The times below are an example of how to WEAN OFF medications WHILE CONTINUING TO OPTIMIZE PAIN CONTROL.  Your actual medication schedule may vary based on your last dose taken.    Time 12:00am 4:00am 8:00am 12:00pm 4:00pm 8:00pm   Med Tramadol Oxycodone   Tramadol Oxycodone Tramadol Oxycodone     Time 12:00am 6:00am 12:00pm 6:00pm   Med Tramadol Oxycodone   Tramadol Oxycodone     Time 12:00am 8:00am 4:00pm   Med Tramadol Oxycodone   Tramadol     Time 12:00am 12:00pm   Med Tramadol Tramadol         TOTAL KNEE REPLACEMENT PATIENTS SHOULD TRANSITION TO OUTPATIENT PHYSICAL THERAPY NO MORE THAN 3 WEEKS FOLLOWING SURGERY.  PLEASE SEE THE LIST OF  FACILITIES BELOW.  CALL TO SCHEDULE YOUR FIRST APPOINTMENT BEFORE YOU HAVE YOUR  SURGERY.

## 2025-02-12 ENCOUNTER — HOME HEALTH ADMISSION (OUTPATIENT)
Dept: HOME HEALTH SERVICES | Facility: HOME HEALTH | Age: 55
End: 2025-02-12
Payer: COMMERCIAL

## 2025-02-12 ENCOUNTER — HOSPITAL ENCOUNTER (OUTPATIENT)
Facility: HOSPITAL | Age: 55
Setting detail: OUTPATIENT SURGERY
Discharge: HOME | End: 2025-02-12
Attending: ORTHOPAEDIC SURGERY | Admitting: ORTHOPAEDIC SURGERY
Payer: COMMERCIAL

## 2025-02-12 ENCOUNTER — ANESTHESIA (OUTPATIENT)
Dept: OPERATING ROOM | Facility: HOSPITAL | Age: 55
End: 2025-02-12
Payer: COMMERCIAL

## 2025-02-12 ENCOUNTER — DOCUMENTATION (OUTPATIENT)
Dept: HOME HEALTH SERVICES | Facility: HOME HEALTH | Age: 55
End: 2025-02-12

## 2025-02-12 ENCOUNTER — PHARMACY VISIT (OUTPATIENT)
Dept: PHARMACY | Facility: CLINIC | Age: 55
End: 2025-02-12
Payer: COMMERCIAL

## 2025-02-12 VITALS
DIASTOLIC BLOOD PRESSURE: 73 MMHG | HEART RATE: 96 BPM | OXYGEN SATURATION: 97 % | BODY MASS INDEX: 27.98 KG/M2 | WEIGHT: 174.1 LBS | RESPIRATION RATE: 16 BRPM | SYSTOLIC BLOOD PRESSURE: 121 MMHG | TEMPERATURE: 97.7 F | HEIGHT: 66 IN

## 2025-02-12 DIAGNOSIS — M17.12 UNILATERAL PRIMARY OSTEOARTHRITIS, LEFT KNEE: ICD-10-CM

## 2025-02-12 DIAGNOSIS — Z96.652 S/P TOTAL KNEE ARTHROPLASTY, LEFT: Primary | ICD-10-CM

## 2025-02-12 LAB — HCG UR QL IA.RAPID: NEGATIVE

## 2025-02-12 PROCEDURE — 7100000009 HC PHASE TWO TIME - INITIAL BASE CHARGE: Performed by: ORTHOPAEDIC SURGERY

## 2025-02-12 PROCEDURE — 2500000001 HC RX 250 WO HCPCS SELF ADMINISTERED DRUGS (ALT 637 FOR MEDICARE OP)

## 2025-02-12 PROCEDURE — 81025 URINE PREGNANCY TEST: CPT | Performed by: ORTHOPAEDIC SURGERY

## 2025-02-12 PROCEDURE — 97161 PT EVAL LOW COMPLEX 20 MIN: CPT | Mod: GP

## 2025-02-12 PROCEDURE — 2500000004 HC RX 250 GENERAL PHARMACY W/ HCPCS (ALT 636 FOR OP/ED): Performed by: STUDENT IN AN ORGANIZED HEALTH CARE EDUCATION/TRAINING PROGRAM

## 2025-02-12 PROCEDURE — 2500000004 HC RX 250 GENERAL PHARMACY W/ HCPCS (ALT 636 FOR OP/ED): Performed by: PHYSICIAN ASSISTANT

## 2025-02-12 PROCEDURE — A4649 SURGICAL SUPPLIES: HCPCS | Performed by: ORTHOPAEDIC SURGERY

## 2025-02-12 PROCEDURE — 3700000001 HC GENERAL ANESTHESIA TIME - INITIAL BASE CHARGE: Performed by: ORTHOPAEDIC SURGERY

## 2025-02-12 PROCEDURE — 2500000004 HC RX 250 GENERAL PHARMACY W/ HCPCS (ALT 636 FOR OP/ED)

## 2025-02-12 PROCEDURE — 94760 N-INVAS EAR/PLS OXIMETRY 1: CPT | Mod: 59

## 2025-02-12 PROCEDURE — 64447 NJX AA&/STRD FEMORAL NRV IMG: CPT | Performed by: STUDENT IN AN ORGANIZED HEALTH CARE EDUCATION/TRAINING PROGRAM

## 2025-02-12 PROCEDURE — 7100000002 HC RECOVERY ROOM TIME - EACH INCREMENTAL 1 MINUTE: Performed by: ORTHOPAEDIC SURGERY

## 2025-02-12 PROCEDURE — C1776 JOINT DEVICE (IMPLANTABLE): HCPCS | Performed by: ORTHOPAEDIC SURGERY

## 2025-02-12 PROCEDURE — 3600000005 HC OR TIME - INITIAL BASE CHARGE - PROCEDURE LEVEL FIVE: Performed by: ORTHOPAEDIC SURGERY

## 2025-02-12 PROCEDURE — 27447 TOTAL KNEE ARTHROPLASTY: CPT | Performed by: ORTHOPAEDIC SURGERY

## 2025-02-12 PROCEDURE — 2720000007 HC OR 272 NO HCPCS: Performed by: ORTHOPAEDIC SURGERY

## 2025-02-12 PROCEDURE — 96372 THER/PROPH/DIAG INJ SC/IM: CPT | Mod: 59 | Performed by: ANESTHESIOLOGIST ASSISTANT

## 2025-02-12 PROCEDURE — 2500000004 HC RX 250 GENERAL PHARMACY W/ HCPCS (ALT 636 FOR OP/ED): Performed by: ANESTHESIOLOGIST ASSISTANT

## 2025-02-12 PROCEDURE — 97530 THERAPEUTIC ACTIVITIES: CPT | Mod: GP

## 2025-02-12 PROCEDURE — 2500000001 HC RX 250 WO HCPCS SELF ADMINISTERED DRUGS (ALT 637 FOR MEDICARE OP): Performed by: PHYSICIAN ASSISTANT

## 2025-02-12 PROCEDURE — C1713 ANCHOR/SCREW BN/BN,TIS/BN: HCPCS | Performed by: ORTHOPAEDIC SURGERY

## 2025-02-12 PROCEDURE — 7100000011 HC EXTENDED STAY RECOVERY HOURLY - NURSING UNIT

## 2025-02-12 PROCEDURE — 3600000010 HC OR TIME - EACH INCREMENTAL 1 MINUTE - PROCEDURE LEVEL FIVE: Performed by: ORTHOPAEDIC SURGERY

## 2025-02-12 PROCEDURE — RXMED WILLOW AMBULATORY MEDICATION CHARGE

## 2025-02-12 PROCEDURE — 2500000005 HC RX 250 GENERAL PHARMACY W/O HCPCS: Performed by: ANESTHESIOLOGIST ASSISTANT

## 2025-02-12 PROCEDURE — 97110 THERAPEUTIC EXERCISES: CPT | Mod: GP

## 2025-02-12 PROCEDURE — 2500000005 HC RX 250 GENERAL PHARMACY W/O HCPCS: Performed by: PHYSICIAN ASSISTANT

## 2025-02-12 PROCEDURE — 7100000001 HC RECOVERY ROOM TIME - INITIAL BASE CHARGE: Performed by: ORTHOPAEDIC SURGERY

## 2025-02-12 PROCEDURE — 2780000003 HC OR 278 NO HCPCS: Performed by: ORTHOPAEDIC SURGERY

## 2025-02-12 PROCEDURE — 2500000004 HC RX 250 GENERAL PHARMACY W/ HCPCS (ALT 636 FOR OP/ED): Performed by: ORTHOPAEDIC SURGERY

## 2025-02-12 PROCEDURE — 7100000010 HC PHASE TWO TIME - EACH INCREMENTAL 1 MINUTE: Performed by: ORTHOPAEDIC SURGERY

## 2025-02-12 PROCEDURE — 97116 GAIT TRAINING THERAPY: CPT | Mod: GP

## 2025-02-12 PROCEDURE — 3700000002 HC GENERAL ANESTHESIA TIME - EACH INCREMENTAL 1 MINUTE: Performed by: ORTHOPAEDIC SURGERY

## 2025-02-12 DEVICE — DOME, PATELLA, MEDIALIZED, 35MM: Type: IMPLANTABLE DEVICE | Site: KNEE | Status: FUNCTIONAL

## 2025-02-12 DEVICE — TIBAL BASE ATTUNE FB, SZ 3 CEM: Type: IMPLANTABLE DEVICE | Site: KNEE | Status: FUNCTIONAL

## 2025-02-12 DEVICE — IMPLANTABLE DEVICE: Type: IMPLANTABLE DEVICE | Site: KNEE | Status: FUNCTIONAL

## 2025-02-12 DEVICE — BONE CEMENT, SMART SET, HIGH VISCOSITY, 40GM: Type: IMPLANTABLE DEVICE | Site: KNEE | Status: FUNCTIONAL

## 2025-02-12 RX ORDER — CEFAZOLIN SODIUM 2 G/100ML
2 INJECTION, SOLUTION INTRAVENOUS EVERY 8 HOURS
Status: DISCONTINUED | OUTPATIENT
Start: 2025-02-12 | End: 2025-02-12 | Stop reason: HOSPADM

## 2025-02-12 RX ORDER — ASPIRIN 81 MG/1
81 TABLET ORAL 2 TIMES DAILY
Status: DISCONTINUED | OUTPATIENT
Start: 2025-02-12 | End: 2025-02-12 | Stop reason: HOSPADM

## 2025-02-12 RX ORDER — LIDOCAINE HYDROCHLORIDE 10 MG/ML
0.1 INJECTION, SOLUTION EPIDURAL; INFILTRATION; INTRACAUDAL; PERINEURAL ONCE
Status: DISCONTINUED | OUTPATIENT
Start: 2025-02-12 | End: 2025-02-12 | Stop reason: HOSPADM

## 2025-02-12 RX ORDER — FENTANYL CITRATE 50 UG/ML
50 INJECTION, SOLUTION INTRAMUSCULAR; INTRAVENOUS EVERY 5 MIN PRN
Status: DISCONTINUED | OUTPATIENT
Start: 2025-02-12 | End: 2025-02-12 | Stop reason: HOSPADM

## 2025-02-12 RX ORDER — OXYCODONE HYDROCHLORIDE 5 MG/1
5 TABLET ORAL EVERY 6 HOURS PRN
Status: DISCONTINUED | OUTPATIENT
Start: 2025-02-12 | End: 2025-02-12 | Stop reason: HOSPADM

## 2025-02-12 RX ORDER — BISACODYL 10 MG/1
10 SUPPOSITORY RECTAL DAILY PRN
Status: DISCONTINUED | OUTPATIENT
Start: 2025-02-12 | End: 2025-02-12 | Stop reason: HOSPADM

## 2025-02-12 RX ORDER — ONDANSETRON HYDROCHLORIDE 2 MG/ML
4 INJECTION, SOLUTION INTRAVENOUS EVERY 8 HOURS PRN
Status: DISCONTINUED | OUTPATIENT
Start: 2025-02-12 | End: 2025-02-12 | Stop reason: HOSPADM

## 2025-02-12 RX ORDER — LABETALOL HYDROCHLORIDE 5 MG/ML
5 INJECTION, SOLUTION INTRAVENOUS ONCE AS NEEDED
Status: DISCONTINUED | OUTPATIENT
Start: 2025-02-12 | End: 2025-02-12 | Stop reason: HOSPADM

## 2025-02-12 RX ORDER — SCOPOLAMINE 1 MG/3D
1 PATCH, EXTENDED RELEASE TRANSDERMAL
Status: DISCONTINUED | OUTPATIENT
Start: 2025-02-12 | End: 2025-02-12 | Stop reason: HOSPADM

## 2025-02-12 RX ORDER — BISACODYL 5 MG
10 TABLET, DELAYED RELEASE (ENTERIC COATED) ORAL DAILY PRN
Status: DISCONTINUED | OUTPATIENT
Start: 2025-02-12 | End: 2025-02-12 | Stop reason: HOSPADM

## 2025-02-12 RX ORDER — PROPOFOL 10 MG/ML
INJECTION, EMULSION INTRAVENOUS AS NEEDED
Status: DISCONTINUED | OUTPATIENT
Start: 2025-02-12 | End: 2025-02-12

## 2025-02-12 RX ORDER — TRANEXAMIC ACID 100 MG/ML
INJECTION, SOLUTION INTRAVENOUS AS NEEDED
Status: DISCONTINUED | OUTPATIENT
Start: 2025-02-12 | End: 2025-02-12

## 2025-02-12 RX ORDER — FENTANYL CITRATE 50 UG/ML
25 INJECTION, SOLUTION INTRAMUSCULAR; INTRAVENOUS EVERY 5 MIN PRN
Status: DISCONTINUED | OUTPATIENT
Start: 2025-02-12 | End: 2025-02-12 | Stop reason: HOSPADM

## 2025-02-12 RX ORDER — KETOROLAC TROMETHAMINE 15 MG/ML
15 INJECTION, SOLUTION INTRAMUSCULAR; INTRAVENOUS EVERY 6 HOURS
Status: DISCONTINUED | OUTPATIENT
Start: 2025-02-12 | End: 2025-02-12 | Stop reason: HOSPADM

## 2025-02-12 RX ORDER — ONDANSETRON HYDROCHLORIDE 2 MG/ML
4 INJECTION, SOLUTION INTRAVENOUS ONCE AS NEEDED
Status: DISCONTINUED | OUTPATIENT
Start: 2025-02-12 | End: 2025-02-12 | Stop reason: HOSPADM

## 2025-02-12 RX ORDER — NORETHINDRONE AND ETHINYL ESTRADIOL 0.5-0.035
KIT ORAL AS NEEDED
Status: DISCONTINUED | OUTPATIENT
Start: 2025-02-12 | End: 2025-02-12

## 2025-02-12 RX ORDER — NALOXONE HYDROCHLORIDE 0.4 MG/ML
0.2 INJECTION, SOLUTION INTRAMUSCULAR; INTRAVENOUS; SUBCUTANEOUS EVERY 5 MIN PRN
Status: DISCONTINUED | OUTPATIENT
Start: 2025-02-12 | End: 2025-02-12 | Stop reason: HOSPADM

## 2025-02-12 RX ORDER — PANTOPRAZOLE SODIUM 40 MG/1
40 TABLET, DELAYED RELEASE ORAL
Status: DISCONTINUED | OUTPATIENT
Start: 2025-02-13 | End: 2025-02-12 | Stop reason: HOSPADM

## 2025-02-12 RX ORDER — CEFAZOLIN SODIUM 2 G/100ML
2 INJECTION, SOLUTION INTRAVENOUS ONCE
Status: COMPLETED | OUTPATIENT
Start: 2025-02-12 | End: 2025-02-12

## 2025-02-12 RX ORDER — DOCUSATE SODIUM 100 MG/1
100 CAPSULE, LIQUID FILLED ORAL 2 TIMES DAILY
Status: DISCONTINUED | OUTPATIENT
Start: 2025-02-12 | End: 2025-02-12 | Stop reason: HOSPADM

## 2025-02-12 RX ORDER — ACETAMINOPHEN 325 MG/1
975 TABLET ORAL ONCE
Status: COMPLETED | OUTPATIENT
Start: 2025-02-12 | End: 2025-02-12

## 2025-02-12 RX ORDER — OXYCODONE HYDROCHLORIDE 5 MG/1
10 TABLET ORAL EVERY 4 HOURS PRN
Status: DISCONTINUED | OUTPATIENT
Start: 2025-02-12 | End: 2025-02-12 | Stop reason: HOSPADM

## 2025-02-12 RX ORDER — PREGABALIN 75 MG/1
75 CAPSULE ORAL ONCE
Status: COMPLETED | OUTPATIENT
Start: 2025-02-12 | End: 2025-02-12

## 2025-02-12 RX ORDER — METOCLOPRAMIDE HYDROCHLORIDE 5 MG/ML
10 INJECTION INTRAMUSCULAR; INTRAVENOUS EVERY 6 HOURS PRN
Status: DISCONTINUED | OUTPATIENT
Start: 2025-02-12 | End: 2025-02-12 | Stop reason: HOSPADM

## 2025-02-12 RX ORDER — SODIUM CHLORIDE, SODIUM LACTATE, POTASSIUM CHLORIDE, CALCIUM CHLORIDE 600; 310; 30; 20 MG/100ML; MG/100ML; MG/100ML; MG/100ML
INJECTION, SOLUTION INTRAVENOUS CONTINUOUS PRN
Status: DISCONTINUED | OUTPATIENT
Start: 2025-02-12 | End: 2025-02-12

## 2025-02-12 RX ORDER — SODIUM CHLORIDE, SODIUM LACTATE, POTASSIUM CHLORIDE, CALCIUM CHLORIDE 600; 310; 30; 20 MG/100ML; MG/100ML; MG/100ML; MG/100ML
50 INJECTION, SOLUTION INTRAVENOUS CONTINUOUS
Status: DISCONTINUED | OUTPATIENT
Start: 2025-02-12 | End: 2025-02-12 | Stop reason: HOSPADM

## 2025-02-12 RX ORDER — MELOXICAM 7.5 MG/1
7.5 TABLET ORAL ONCE
Status: COMPLETED | OUTPATIENT
Start: 2025-02-12 | End: 2025-02-12

## 2025-02-12 RX ORDER — ONDANSETRON HYDROCHLORIDE 2 MG/ML
INJECTION, SOLUTION INTRAVENOUS AS NEEDED
Status: DISCONTINUED | OUTPATIENT
Start: 2025-02-12 | End: 2025-02-12

## 2025-02-12 RX ORDER — ACETAMINOPHEN 325 MG/1
650 TABLET ORAL EVERY 6 HOURS SCHEDULED
Status: DISCONTINUED | OUTPATIENT
Start: 2025-02-12 | End: 2025-02-12 | Stop reason: HOSPADM

## 2025-02-12 RX ORDER — OXYCODONE HCL 10 MG/1
10 TABLET, FILM COATED, EXTENDED RELEASE ORAL ONCE
Status: COMPLETED | OUTPATIENT
Start: 2025-02-12 | End: 2025-02-12

## 2025-02-12 RX ORDER — OXYCODONE HYDROCHLORIDE 5 MG/1
5 TABLET ORAL EVERY 4 HOURS PRN
Status: DISCONTINUED | OUTPATIENT
Start: 2025-02-12 | End: 2025-02-12 | Stop reason: HOSPADM

## 2025-02-12 RX ORDER — MEPERIDINE HYDROCHLORIDE 25 MG/ML
12.5 INJECTION INTRAMUSCULAR; INTRAVENOUS; SUBCUTANEOUS EVERY 10 MIN PRN
Status: DISCONTINUED | OUTPATIENT
Start: 2025-02-12 | End: 2025-02-12 | Stop reason: HOSPADM

## 2025-02-12 RX ORDER — ROPIVACAINE/EPI/CLONIDINE/KET 2.46-0.005
SYRINGE (ML) INJECTION AS NEEDED
Status: DISCONTINUED | OUTPATIENT
Start: 2025-02-12 | End: 2025-02-12 | Stop reason: HOSPADM

## 2025-02-12 RX ORDER — MIDAZOLAM HYDROCHLORIDE 1 MG/ML
2 INJECTION, SOLUTION INTRAMUSCULAR; INTRAVENOUS ONCE
Status: COMPLETED | OUTPATIENT
Start: 2025-02-12 | End: 2025-02-12

## 2025-02-12 RX ORDER — ONDANSETRON 4 MG/1
4 TABLET, ORALLY DISINTEGRATING ORAL EVERY 8 HOURS PRN
Status: DISCONTINUED | OUTPATIENT
Start: 2025-02-12 | End: 2025-02-12 | Stop reason: HOSPADM

## 2025-02-12 RX ORDER — POLYETHYLENE GLYCOL 3350 17 G/17G
17 POWDER, FOR SOLUTION ORAL DAILY
Status: DISCONTINUED | OUTPATIENT
Start: 2025-02-13 | End: 2025-02-12 | Stop reason: HOSPADM

## 2025-02-12 RX ORDER — METOCLOPRAMIDE 10 MG/1
10 TABLET ORAL EVERY 6 HOURS PRN
Status: DISCONTINUED | OUTPATIENT
Start: 2025-02-12 | End: 2025-02-12 | Stop reason: HOSPADM

## 2025-02-12 RX ORDER — CYCLOBENZAPRINE HCL 10 MG
5 TABLET ORAL 3 TIMES DAILY PRN
Status: DISCONTINUED | OUTPATIENT
Start: 2025-02-12 | End: 2025-02-12 | Stop reason: HOSPADM

## 2025-02-12 RX ORDER — PHENYLEPHRINE HCL IN 0.9% NACL 1 MG/10 ML
SYRINGE (ML) INTRAVENOUS AS NEEDED
Status: DISCONTINUED | OUTPATIENT
Start: 2025-02-12 | End: 2025-02-12

## 2025-02-12 RX ORDER — FENTANYL CITRATE 50 UG/ML
50 INJECTION, SOLUTION INTRAMUSCULAR; INTRAVENOUS ONCE
Status: COMPLETED | OUTPATIENT
Start: 2025-02-12 | End: 2025-02-12

## 2025-02-12 RX ORDER — MIDAZOLAM HYDROCHLORIDE 1 MG/ML
INJECTION, SOLUTION INTRAMUSCULAR; INTRAVENOUS AS NEEDED
Status: DISCONTINUED | OUTPATIENT
Start: 2025-02-12 | End: 2025-02-12

## 2025-02-12 RX ADMIN — TRANEXAMIC ACID 1000 MG: 100 INJECTION INTRAVENOUS at 11:38

## 2025-02-12 RX ADMIN — OXYCODONE HYDROCHLORIDE 10 MG: 10 TABLET, FILM COATED, EXTENDED RELEASE ORAL at 09:33

## 2025-02-12 RX ADMIN — MELOXICAM 7.5 MG: 7.5 TABLET ORAL at 09:33

## 2025-02-12 RX ADMIN — OXYCODONE HYDROCHLORIDE 5 MG: 5 TABLET ORAL at 15:05

## 2025-02-12 RX ADMIN — PROPOFOL 55 MCG/KG/MIN: 10 INJECTION, EMULSION INTRAVENOUS at 10:32

## 2025-02-12 RX ADMIN — SODIUM CHLORIDE, POTASSIUM CHLORIDE, SODIUM LACTATE AND CALCIUM CHLORIDE: 600; 310; 30; 20 INJECTION, SOLUTION INTRAVENOUS at 10:00

## 2025-02-12 RX ADMIN — PREGABALIN 75 MG: 75 CAPSULE ORAL at 09:33

## 2025-02-12 RX ADMIN — NORETHINDRONE AND ETHINYL ESTRADIOL 5 MG: KIT ORAL at 10:42

## 2025-02-12 RX ADMIN — ONDANSETRON 4 MG: 2 INJECTION INTRAMUSCULAR; INTRAVENOUS at 11:38

## 2025-02-12 RX ADMIN — Medication 50 MCG: at 10:49

## 2025-02-12 RX ADMIN — MIDAZOLAM 2 MG: 1 INJECTION INTRAMUSCULAR; INTRAVENOUS at 09:58

## 2025-02-12 RX ADMIN — NORETHINDRONE AND ETHINYL ESTRADIOL 5 MG: KIT ORAL at 10:35

## 2025-02-12 RX ADMIN — EPHEDRINE SULFATE 25 MG: 50 INJECTION, SOLUTION INTRAVENOUS at 10:32

## 2025-02-12 RX ADMIN — DEXAMETHASONE SODIUM PHOSPHATE 4 MG: 4 INJECTION, SOLUTION INTRAMUSCULAR; INTRAVENOUS at 10:42

## 2025-02-12 RX ADMIN — MEPIVACAINE HYDROCHLORIDE 3 ML: 15 INJECTION, SOLUTION EPIDURAL; INFILTRATION at 10:26

## 2025-02-12 RX ADMIN — Medication 50 MCG: at 11:06

## 2025-02-12 RX ADMIN — NORETHINDRONE AND ETHINYL ESTRADIOL 5 MG: KIT ORAL at 11:47

## 2025-02-12 RX ADMIN — NORETHINDRONE AND ETHINYL ESTRADIOL 5 MG: KIT ORAL at 11:53

## 2025-02-12 RX ADMIN — CEFAZOLIN SODIUM 2 G: 2 INJECTION, SOLUTION INTRAVENOUS at 10:28

## 2025-02-12 RX ADMIN — PROPOFOL 15 MG: 10 INJECTION, EMULSION INTRAVENOUS at 10:39

## 2025-02-12 RX ADMIN — Medication 50 MCG: at 11:26

## 2025-02-12 RX ADMIN — TRANEXAMIC ACID 1000 MG: 100 INJECTION INTRAVENOUS at 10:35

## 2025-02-12 RX ADMIN — PROPOFOL 13.8 MG: 10 INJECTION, EMULSION INTRAVENOUS at 12:05

## 2025-02-12 RX ADMIN — FENTANYL CITRATE 50 MCG: 50 INJECTION INTRAMUSCULAR; INTRAVENOUS at 09:58

## 2025-02-12 RX ADMIN — Medication 50 MCG: at 11:08

## 2025-02-12 RX ADMIN — NORETHINDRONE AND ETHINYL ESTRADIOL 5 MG: KIT ORAL at 11:30

## 2025-02-12 RX ADMIN — Medication 50 MCG: at 11:39

## 2025-02-12 RX ADMIN — PROPOFOL 30 MG: 10 INJECTION, EMULSION INTRAVENOUS at 10:30

## 2025-02-12 RX ADMIN — MIDAZOLAM 2 MG: 1 INJECTION INTRAMUSCULAR; INTRAVENOUS at 10:20

## 2025-02-12 RX ADMIN — POVIDONE-IODINE 1 APPLICATION: 5 SOLUTION TOPICAL at 09:40

## 2025-02-12 RX ADMIN — Medication 50 MCG: at 10:57

## 2025-02-12 RX ADMIN — PROPOFOL 10 MG: 10 INJECTION, EMULSION INTRAVENOUS at 10:40

## 2025-02-12 RX ADMIN — ACETAMINOPHEN 975 MG: 325 TABLET ORAL at 09:33

## 2025-02-12 RX ADMIN — PROPOFOL 20 MG: 10 INJECTION, EMULSION INTRAVENOUS at 11:46

## 2025-02-12 RX ADMIN — ACETAMINOPHEN 650 MG: 325 TABLET ORAL at 15:04

## 2025-02-12 RX ADMIN — SCOPOLAMINE 1 PATCH: 1.5 PATCH, EXTENDED RELEASE TRANSDERMAL at 09:33

## 2025-02-12 ASSESSMENT — PAIN DESCRIPTION - DESCRIPTORS
DESCRIPTORS: DULL
DESCRIPTORS: ACHING

## 2025-02-12 ASSESSMENT — PAIN SCALES - GENERAL
PAINLEVEL_OUTOF10: 0 - NO PAIN
PAINLEVEL_OUTOF10: 6
PAINLEVEL_OUTOF10: 6
PAINLEVEL_OUTOF10: 0 - NO PAIN
PAINLEVEL_OUTOF10: 0 - NO PAIN
PAINLEVEL_OUTOF10: 4
PAINLEVEL_OUTOF10: 0 - NO PAIN

## 2025-02-12 ASSESSMENT — COGNITIVE AND FUNCTIONAL STATUS - GENERAL
WALKING IN HOSPITAL ROOM: A LITTLE
CLIMB 3 TO 5 STEPS WITH RAILING: A LITTLE
MOBILITY SCORE: 22

## 2025-02-12 ASSESSMENT — PAIN DESCRIPTION - ORIENTATION
ORIENTATION: LEFT
ORIENTATION: LEFT

## 2025-02-12 ASSESSMENT — PAIN DESCRIPTION - LOCATION
LOCATION: KNEE
LOCATION: KNEE

## 2025-02-12 ASSESSMENT — ACTIVITIES OF DAILY LIVING (ADL)
LACK_OF_TRANSPORTATION: NO
ADL_ASSISTANCE: INDEPENDENT
ADLS_ADDRESSED: YES

## 2025-02-12 NOTE — PROGRESS NOTES
Medication Education     Medication education for Alisa Balnd was provided to the patient and family for the following medication(s):  Aspirin  Docusate  Meloxicam  Oxycodone  Tylenol  Pantoprazole  Miralax  Tramadol    Medication education provided by a Pharmacist:  -Proper dose, indication, possible ADRs   -How the medication works and benefits of taking it  -Importance of compliance   -Potential duration of therapy    Identified potential barriers to education:  None    Method(s) of Education:  Verbal Written materials provided and reviewed    An opportunity to ask questions and receive answers was provided.     Assessment of understanding the patient and family:  2= meets goals/outcomes    Additional Notes (if applicable): Meds to beds delivered to patient and family.    Angi Massey, AlbinoD

## 2025-02-12 NOTE — DISCHARGE SUMMARY
MD Fiorella Winter, MPAS, PAVicC, ATC  Adult Reconstruction and Joint Replacement Surgery  Phone: [phone]     Fax:[phone]             Discharge Summary    Discharge Diagnosis  Left Total Knee Arthroplasty     Issues Requiring Follow-Up  Home care services to start within 48 hours. Outpatient PT to start 2 weeks  S/P total Joint for Knees only. Hips optional.    Test Results Pending At Discharge  Pending Labs       No current pending labs.          Hospital Course  Patient underwent Left Total Knee Arthroplasty  on 2/12/25 without complications. The patient was then taken to the PACU in stable condition. Patient was then transferred to the or.  Pain was appropriately controlled. Diet was advanced as tolerated. Patient progressed adequately through their recovery during hospital stay including PT/ OT and were recommended for discharge. Patient was then discharged on  to home in stable condition. Patient had uneventful hospital course. Patient was instructed on the use of pain medications as needed for pain. The signs and symptoms of infection were discussed and the patient was given our number to call should they have any questions or concerns following discharge.    Based on my clinical judgment, the patient was provided with a 7-day prescription for opioid medication at 30 MED, indicated for treatment of acute pain in the setting of recent Total Joint Arthroplasty. OARRS report was run and has demonstrated an appropriate time course.  The patient has been provided with counseling pertaining to safe use of opioid medication.    Patient may use operative extremity WBAT with use of walker for assistance with ambulation .  Mepilex dressing to be removed POD # 7 by home care and incision left open to air  OAC for DVT prophylaxis started on POD #1 and to be taken for 30 days    Patient is to follow-up in 6 weeks at scheduled post-op visit.     Face-to Face after surgery progress  note  Pertinent Physical Exam At Time of Discharge  Review of Systems   Constitutional: Negative.  Negative for activity change, chills, fatigue and fever.   HENT: Negative.     Eyes: Negative.    Respiratory: Negative.  Negative for cough, chest tightness, shortness of breath and wheezing.    Cardiovascular: Negative.  Negative for palpitations.   Gastrointestinal:  Negative for abdominal pain, blood in stool, nausea and vomiting.   Endocrine: Negative.  Negative for cold intolerance and polyuria.   Genitourinary: Negative.  Negative for difficulty urinating, dysuria, frequency, hematuria and urgency.   Musculoskeletal:  Positive for gait problem and joint swelling. Negative for arthralgias and back pain.   Skin: Negative.  Negative for color change, pallor, rash and wound.   Allergic/Immunologic: Negative.  Negative for environmental allergies.   Neurological:  Negative for dizziness, weakness and light-headedness.   Hematological: Negative.    Psychiatric/Behavioral:  Negative for agitation, confusion and suicidal ideas. The patient is not nervous/anxious.    All other systems reviewed and are negative    Physical Exam  side: left leg  Vitals and nursing note reviewed. VSS, Afebrile  Constitutional:       Appearance: Normal appearance, awake and alert.  HENT:      Head: Normocephalic and atraumatic.       Pupils: Pupils are equal, round, and reactive to light.   Cardiovascular:      Rate and Rhythm: Normal rate and regular rhythm.   Pulmonary:      Effort: Pulmonary effort is normal.     Abdominal:         Palpations: Abdomen is soft.   Musculoskeletal:   Sensation intact bilaterally, sural/saph/sp/tibal n.  Motor intact flexion/extension/DF/PF/EHL/FHL bilaterally. Palpable symmetric DP/PT pulse bilaterally. Spinal wearing off.    Skin:      Bulky Dressing intact to the surgical extremity. No signs of gross bloody or purulent drainage.     General: Skin is warm and dry.      Capillary Refill: Capillary refill  takes less than 2 seconds.   Neurological:      General: No focal deficit present.      Mental Status: She is alert and oriented to person, place, and time. Mental status is at baseline.   Psychiatric:         Mood and Affect: Mood normal.        Home Medications  Scheduled medications    Current Facility-Administered Medications:     fentaNYL PF (Sublimaze) injection 25 mcg, 25 mcg, intravenous, q5 min PRN, Shaan Romero MD    fentaNYL PF (Sublimaze) injection 50 mcg, 50 mcg, intravenous, q5 min PRN, Shaan Romero MD    labetaloL (Normodyne,Trandate) injection 5 mg, 5 mg, intravenous, Once PRN, Shaan Romero MD    lidocaine PF (Xylocaine) 10 mg/mL (1 %) injection 1 mg, 0.1 mL, subcutaneous, Once, Shaan Romero MD    meperidine PF (Demerol) injection 12.5 mg, 12.5 mg, intravenous, q10 min PRN, Shaan Romero MD    ondansetron (Zofran) injection 4 mg, 4 mg, intravenous, Once PRN, Shaan Romero MD    oxyCODONE (Roxicodone) immediate release tablet 5 mg, 5 mg, oral, q4h PRN, Shaan Romero MD    promethazine (Phenergan) 6.25 mg in sodium chloride 0.9% 50 mL IV, 6.25 mg, intravenous, Once PRN, Shaan Romero MD    scopolamine (Transderm-Scop) patch 1 patch, 1 patch, transdermal, q72h, Fiorella Rodriguez PA-C, 1 patch at 02/12/25 0933     PRN medications  PRN medications: fentaNYL PF, fentaNYL PF, labetaloL, meperidine, ondansetron, oxyCODONE, promethazine    Discharge medications     Your medication list        START taking these medications        Instructions Last Dose Given Next Dose Due   aspirin 81 mg EC tablet      Take 1 tablet (81 mg) by mouth 2 times a day.       docusate sodium 100 mg capsule  Commonly known as: Colace      Take 1 capsule (100 mg) by mouth 2 times a day.       meloxicam 15 mg tablet  Commonly known as: Mobic      Take 1 tablet (15 mg) by mouth once daily.       oxyCODONE 5 mg immediate release tablet  Commonly known as: Roxicodone      Take 1  tablet (5 mg) by mouth every 6 hours if needed for severe pain (7 - 10) for up to 7 days.       Pain Relief ES (acetaminophen) 500 mg tablet  Generic drug: acetaminophen      Take 2 tablets (1,000 mg) by mouth every 6 hours if needed for mild pain (1 - 3).       pantoprazole 40 mg EC tablet  Commonly known as: ProtoNix      Take 1 tablet (40 mg) by mouth once daily. Do not crush, chew, or split.       polyethylene glycol 17 gram/dose powder  Commonly known as: Glycolax, Miralax      Mix 1 cap (17g) into 8 ounces of fluid and once drink daily.       traMADol 50 mg tablet  Commonly known as: Ultram      Take 1 tablet (50 mg) by mouth every 6 hours if needed for severe pain (7 - 10) for up to 7 days.              CONTINUE taking these medications        Instructions Last Dose Given Next Dose Due   multivitamin tablet           semaglutide      Per Sanjuanar protocol inject 0.3mg subcutaneously once a week for 4 weeks, then increase to 0.6mg once a week.       sertraline 50 mg tablet  Commonly known as: Zoloft      Take 1 tablet (50 mg) by mouth once daily.              STOP taking these medications      ibuprofen 800 mg tablet                  Where to Get Your Medications        These medications were sent to [facility]  [address], [address], [place] [address]      Hours: 8 AM to 6 PM Mon-Fri, 9 AM to 1 PM Saturday Phone: [phone]   aspirin 81 mg EC tablet  docusate sodium 100 mg capsule  meloxicam 15 mg tablet  oxyCODONE 5 mg immediate release tablet  Pain Relief ES (acetaminophen) 500 mg tablet  pantoprazole 40 mg EC tablet  polyethylene glycol 17 gram/dose powder  traMADol 50 mg tablet         You have not been prescribed any medications.     Outpatient Follow-Up  Patient to follow-up with /Fiorella Rodriguez PA-C.  Thank you for trusting us with your care. You should be scheduled for a follow-up post-surgical visit in 6 weeks.    Please read discharge instructions provided by your  surgeon before calling with questions as this will delay care.    Medication refills-Oxycodone and Tramadol will be refilled every 7 days per state law. Request refills through [facility] at the institution in which you had surgery or MyChart. All medication requests may take up to 72 hours to refill and refills after Friday 1pm will be refilled on the next business day.      No future appointments.    LORETTA Weiner, PA-C ATC  Orthopedic Physician Assisant  Adult Reconstruction and Total Joint Replacement  General Orthopedics  Department of Orthopaedic Surgery  [place] [address]  rubberitaging preferred

## 2025-02-12 NOTE — OP NOTE
"Knee Replacement Total Cement Unilat *Rapid Recovery* (L) Operative Note     Date: 2025  OR Location: [facility]    Name: Alisa Bland \"David", : 1970, Age: 54 y.o., MRN: [id number], Sex: female    Diagnosis  Pre-op Diagnosis      * Unilateral primary osteoarthritis, left knee [M17.12] Post-op Diagnosis     * Unilateral primary osteoarthritis, left knee [M17.12]     Procedures  Knee Replacement Total Cement Unilat *Rapid Recovery*  55833 - AL ARTHRP KNE CONDYLE&PLATU MEDIAL&LAT COMPARTMENTS      Surgeons      * Jaswant Marques - Primary    Resident/Fellow/Other Assistant:  Surgeons and Role:  * No surgeons found with a matching role *    Staff:   Circulator: Lynda Rodriguezub Person: Leilani Sharma Person: Marilynn    Anesthesia Staff: Anesthesiologist: Shaan Romero MD  C-AA: ERNESTINE Gaines    Procedure Summary  Anesthesia: Regional, Spinal  ASA: II  Estimated Blood Loss: 25mL  Intra-op Medications:   Administrations occurring from 1005 to 1240 on 25:   Medication Name Total Dose   ropivacaine-epinephrine-clonidine-ketorolac 2.46-0.005- 0.0008-0.3mg/mL periarticular syringe 50 mL   dexAMETHasone (Decadron) injection 4 mg/mL 4 mg   ePHEDrine injection 25 mg   ePHEDrine injection 15 mg   mepivacaine (Carbocaine) injection 1.5 % 3 mL   midazolam (Versed) injection 1 mg/mL 2 mg   phenylephrine 100 mcg/mL syringe 10 mL (prefilled) 250 mcg   propofol (Diprivan) injection 10 mg/mL 611.16 mg   tranexamic acid (Cyklokapron) injection 1,000 mg   ceFAZolin (Ancef) 2 g in dextrose (iso)  mL 2 g              Anesthesia Record               Intraprocedure I/O Totals          Intake    Tranexamic Acid 0.00 mL    The total shown is the total volume documented since Anesthesia Start was filed.    Total Intake 0 mL          Specimen: No specimens collected     Drains and/or Catheters: HV x1    Tourniquet Times:   * Missing tourniquet times found for documented tourniquets in lo * " "    Implants:  Implants       Type Name Action Serial No.      Joint Knee BONE CEMENT, SMART SET, HIGH VISCOSITY, 40GM - [id number] Implanted      Joint Knee INSERT, ATTUNE PS FB, SZ 4, 8MM - [id number] Implanted      Joint Knee TIBAL BASE ATTUNE FB, SZ 3 RULA - [id number] Implanted      Joint Knee FEMORAL, ATTUNE PS, RULA, NRW, SZ 4, LT - [id number] Implanted      Joint Knee DOME, PATELLA, MEDIALIZED, 35MM - [id number] Implanted               Findings: advanced OA    Indications: Alisa Bland \"David" is an 54 y.o. female who is having surgery for Unilateral primary osteoarthritis, left knee [M17.12].     The patient was seen in the preoperative area. The risks, benefits, complications, treatment options, non-operative alternatives, expected recovery and outcomes were discussed with the patient. The possibilities of reaction to medication, pulmonary aspiration, injury to surrounding structures, bleeding, recurrent infection, the need for additional procedures, failure to diagnose a condition, and creating a complication requiring transfusion or operation were discussed with the patient. The patient concurred with the proposed plan, giving informed consent.  The site of surgery was properly noted/marked if necessary per policy. The patient has been actively warmed in preoperative area. Preoperative antibiotics have been ordered and given within 1 hours of incision. Venous thrombosis prophylaxis have been ordered including bilateral sequential compression devices    Procedure Details: L TKA  Complications:  None; patient tolerated the procedure well.    Disposition: PACU - hemodynamically stable.  Condition: stable     PREOPERATIVE DIAGNOSIS:  left knee osteoarthritis     POSTOPERATIVE DIAGNOSIS:  left knee osteoarthritis     OPERATION/PROCEDURE:  left total knee arthroplasty     SURGEON:  Jaswant Marques MD     ASSISTANT(S):  Latasha Guzman MD PGY4     ANESTHESIA:  Spinal, adductor canal block   "   LOCATION:  [facility]     ESTIMATED BLOOD LOSS AND INTRAVENOUS FLUIDS:  Please see Anesthesia record.     COMPONENTS USED:  DePuy Attune knee system  1. 4N femur  2. 3 tibia  3. 35 patella  4. 8mm insert     BRIEF CLINICAL NOTE:  The patient is a 55 yo female with advanced osteoarthritis of  their left knee.  They failed conservative treatment and wished to  proceed with total knee arthroplasty which is indicated at this time.  We discussed the risks, benefits, and alternatives of surgery  including but not limited to infection, damage to vessel or nerve,  bleeding, soft tissue pain, DVT, PE, problems with anesthesia, lack  of range of motion, continued soft tissue pain, need for further  surgery, etc.  Consent was obtained.  They were taken to the operating  room in order to undergo the procedure.    PRE-OP ROM: 5-115     OPERATIVE REPORT:  The patient was transferred to the operating room table.  Time-out  was performed confirming patient name, medical record number,  surgical site, and adequate and appropriate imaging.  The patient  received appropriate IV antibiotics as well as tranexamic acid.  Once we were prepped and draped,midline skin incision was performed.  Hemostasis was obtained using electrocautery.  Underlying extensor mechanism was easily identified and entered using a standard medial parapatellar arthrotomy performedsharply.  The infrapatellar and suprapatellar fat pads were debrided.Initial medial release was performed.  The patella was subluxed laterally.  Distal femur was entered using a step drill.  Distal  femoral cut was performed, and the femur was sized and prepared.  The tibia was subluxed forward using a double-prong PCL retractor.  The proximal tibia was cut in neutral mechanical axis using an extramedullary tibial guide.  We then used the lamina  to clear out the posterior osteophytes and clear the meniscal remnants. We then sized the tibia and prepared it. We cut the patella freehand  using a caliper to restore the native patellar height. We then trialed with multiple polyethylene inserts.  Once I was happy with the position of components and stability of the knee, all trial components were removed.  The cut bony surfaces were thoroughly irrigated and dried.  The posterior capsule and surrounding soft tissues were injected with  YENNIFER solution.  We then cemented into place the real components.  The knee was held in extension until all cement was hardened.  All extraneous cement was removed.  We then closed the extensor mechanism over a drain using interrupted #2 Ethibond as well as interrupted 0 Vicryl.  The subcu was closed with interrupted 2-0 Vicryl.  The skin was closed with running 3-0 Biosyn followed by Dermabond and Steri-Strips.  Dry sterile dressing was placed.  The patient was transferred back to the hospital bed without incident or complication.  She will be weightbearing as tolerated.  They will be on ASA and SCDs for DVT  prophylaxis.     Additional Details: WBAT, ASA    Attending Attestation: I was present and scrubbed for the key portions of the procedure.

## 2025-02-12 NOTE — ANESTHESIA PROCEDURE NOTES
Peripheral Block    Patient location during procedure: pre-op  Start time: 2/12/2025 10:00 AM  End time: 2/12/2025 10:02 AM  Reason for block: at surgeon's request and post-op pain management  Staffing  Performed: attending   Authorized by: Brinda Mojica MD    Performed by: Brinda Mojica MD  Preanesthetic Checklist  Completed: patient identified, IV checked, site marked, risks and benefits discussed, surgical consent, monitors and equipment checked, pre-op evaluation and timeout performed   Timeout performed at: 2/12/2025 9:58 AM  Peripheral Block  Patient position: laying flat  Prep: ChloraPrep  Patient monitoring: heart rate, continuous pulse ox and cardiac monitor  Block type: adductor canal  Laterality: left  Injection technique: single-shot  Guidance: ultrasound guided  Local infiltration: ropivacaine  Infiltration strength: 0.5 %  Dose: 20 mL  Needle  Needle gauge: 21 G  Needle length: 10 cm  Needle localization: ultrasound guidance     image stored in chart  Assessment  Injection assessment: incremental injection, negative aspiration for heme, local visualized surrounding nerve on ultrasound and no paresthesia on injection  Paresthesia pain: none  Heart rate change: no  Slow fractionated injection: yes  Additional Notes  Time out was performed with block nurse.  Site was prepped with chlorhexadine scrub.  Needle was inserted under ultrasound guidance and advanced to the correct position.  20mL Ropivacaine with 5mg Decadron was injected in 5cc increments with negative aspiration every 5ccs.  The patient tolerated the procedure well.  No complications.

## 2025-02-12 NOTE — PROGRESS NOTES
Physical Therapy Evaluation & Treatment    Patient Name: Charlotte Bland  MRN: [id number]  Department: [facility] PACU  Room: [facility] OR  Today's Date: 2/12/2025   Time Calculation  Start Time: 1502  Stop Time: 1541  Time Calculation (min): 39 min    Assessment/Plan   PT Assessment  PT Assessment Results: Decreased strength, Decreased range of motion, Decreased endurance, Impaired balance, Orthopedic restrictions, Pain  Rehab Prognosis: Excellent  Barriers to Discharge Home: No anticipated barriers  Evaluation/Treatment Tolerance: Patient tolerated treatment well  End of Session Communication: Bedside nurse  Assessment Comment: Pt presents with impaired functional mobility s/p L TKR. Recommend discharge home with 24 hour supervision/intermittent assistance and home health PT. Pt was issued HEP handout. Pt verbalized and demonstrated understanding.   End of Session Patient Position: Up in chair, BLE elevated, ice to surgical site, call light in reach, needs met, spouse present, RN aware.  IP OR SWING BED PT PLAN  Inpatient or Swing Bed: Inpatient  PT Plan  Treatment/Interventions: Bed mobility, Gait training, Stair training, Balance training, Transfer training, Strengthening, Endurance training, Range of motion, Therapeutic exercise, Home exercise program, Therapeutic activity, Positioning  PT Plan: Ongoing PT  PT Frequency: BID  PT Discharge Recommendations: Low intensity level of continued care  Equipment Recommended upon Discharge: Wheeled walker, Straight cane  PT Recommended Transfer Status: Stand by assist, Assistive device  PT - OK to Discharge: Yes      Subjective     General Visit Information:  General  Reason for Referral: L TKR  Referred By: Dr. Marques  Past Medical History Relevant to Rehab: OA, anxiety, depression, hyperparathyroidism, thyroidectomy  Family/Caregiver Present: Yes  Prior to Session Communication: Bedside nurse  Patient Position Received: Bed, 2 rail up  General Comment: L knee post-op  dressing dry and intact. Hemovac to L knee in place. RN to remove.     Home Living:  Home Living  Lives With: Spouse  Home Adaptive Equipment: Walker rolling or standard, Cane  Home Layout: Multi-level, Stairs to alternate level with rails  Alternate Level Stairs-Number of Steps: 12  Home Access: Stairs to enter without rails  Entrance Stairs-Rails: None  Entrance Stairs-Number of Steps: 2  Bathroom Equipment: Raised toilet seat with rails, Shower chair without back  Prior Level of Function:  Prior Function Per Pt/Caregiver Report  Level of Cashiers: Independent with ADLs and functional transfers, Independent with homemaking with ambulation  ADL Assistance: Independent  Homemaking Assistance: Independent  Ambulatory Assistance: Independent  Vocational: Full time employment (desk job)  Prior Function Comments: Pt denies falls prior to admission.  Precautions:  Precautions  LE Weight Bearing Status: Weight Bearing as Tolerated  Medical Precautions: Fall precautions  Post-Surgical Precautions: Left total knee precautions    Objective   Pain:  Pain Assessment  Pain Assessment: 0-10  0-10 (Numeric) Pain Score: 6  Pain Type: Surgical pain  Pain Location: Knee  Pain Orientation: Left  Pain Interventions: Medication (See MAR), Cold applied, Repositioned, Elevated  Cognition:  Cognition  Overall Cognitive Status: Within Functional Limits  Attention: Within Functional Limits  Memory: Within Funtional Limits  Problem Solving: Within Functional Limits  Numeric Reasoning: Within Functional Limits  Abstract Reasoning: Within Functional Limits  Safety/Judgement: Within Functional Limits  Insight: Within function limits    General Assessments:  Activity Tolerance  Endurance: Endurance does not limit participation in activity    Sensation  Light Touch: No apparent deficits    Coordination  Movements are Fluid and Coordinated: Yes    Postural Control  Postural Control: Within Functional Limits    Static Sitting Balance  Static  Sitting-Balance Support: Feet supported  Static Sitting-Level of Assistance: Independent  Dynamic Sitting Balance  Dynamic Sitting-Balance Support: Feet supported  Dynamic Sitting-Level of Assistance: Independent    Static Standing Balance  Static Standing-Balance Support: Bilateral upper extremity supported (with RW)  Static Standing-Level of Assistance: Close supervision  Dynamic Standing Balance  Dynamic Standing-Balance Support: Bilateral upper extremity supported (with RW)  Dynamic Standing-Level of Assistance: Close supervision  Functional Assessments:  ADL  ADL's Addressed: Yes  Toileting Assistance with Device: Stand by  Toileting Deficit: Verbal cueing, Supervison/safety, Grab bar use  Functional Assistance: Stand by  Functional Deficit: Verbal cueing, Supervision/safety, Toilet transfer    Bed Mobility  Bed Mobility: Yes  Bed Mobility 1  Bed Mobility 1: Supine to sitting  Level of Assistance 1: Close supervision    Transfers  Transfer: Yes  Transfer 1  Transfer From 1: Bed to, Wheelchair to  Transfer to 1: Wheelchair, Chair with arms  Technique 1: Sit to stand, Stand to sit  Transfer Device 1: Walker  Transfer Level of Assistance 1: Close supervision, Minimal verbal cues (cues for hand placement)    Ambulation/Gait Training  Ambulation/Gait Training Performed: Yes  Ambulation/Gait Training 1  Surface 1: Level tile  Device 1: Rolling walker  Assistance 1: Close supervision, Minimal verbal cues (cues for sequencing)  Quality of Gait 1: Antalgic (decreased soledad, step to pattern, cues to remain within KATY of AD, no LOB noted.)  Comments/Distance (ft) 1: 150 feet x 2    Stairs  Stairs: Yes  Stairs  Rails 1: Right  Device 1: Railing, Single point cane  Assistance 1: Close supervision, Minimal verbal cues (cues for sequencing)  Comment/Number of Steps 1: 3  Pt demonstrated step to pattern, decreased soledad, no LOB noted.     Stairs 2  Device 2: Single point cane  Assistance 2: Minimum assistance, Hand held  assistance (via spouse to simulate home)  Comment/Number of Steps 2: 3  Pt demonstrated step to pattern, decreased soledad, no LOB noted.     Extremity/Trunk Assessments:  RUE   RUE : Within Functional Limits  LUE   LUE: Within Functional Limits  RLE   RLE : Within Functional Limits  LLE   LLE : Exceptions to WFL  Treatments:  Therapeutic Exercise  Therapeutic Exercise Performed: Yes  B ankle pumps, L quad sets, L gluteal sets, L heel slides, L SAQ, L hip abduction, and L SLR x 10 reps each.    Outcome Measures:  [facility] Basic Mobility  Turning from your back to your side while in a flat bed without using bedrails: None  Moving from lying on your back to sitting on the side of a flat bed without using bedrails: None  Moving to and from bed to chair (including a wheelchair): None  Standing up from a chair using your arms (e.g. wheelchair or bedside chair): None  To walk in hospital room: A little  Climbing 3-5 steps with railing: A little  Basic Mobility - Total Score: 22        Education Documentation  Handouts, taught by Chela Richter PT at 2/12/2025  3:02 PM.  Learner: Significant Other, Patient  Readiness: Acceptance  Method: Explanation, Demonstration, Handout  Response: Verbalizes Understanding, Demonstrated Understanding    Precautions, taught by Chela Richter PT at 2/12/2025  3:02 PM.  Learner: Significant Other, Patient  Readiness: Acceptance  Method: Explanation, Demonstration, Handout  Response: Verbalizes Understanding, Demonstrated Understanding    Body Mechanics, taught by Chela Richter PT at 2/12/2025  3:02 PM.  Learner: Significant Other, Patient  Readiness: Acceptance  Method: Explanation, Demonstration, Handout  Response: Verbalizes Understanding, Demonstrated Understanding    Home Exercise Program, taught by Chela Richter PT at 2/12/2025  3:02 PM.  Learner: Significant Other, Patient  Readiness: Acceptance  Method: Explanation, Demonstration, Handout  Response: Verbalizes  Understanding, Demonstrated Understanding    Mobility Training, taught by Chela Richter, PT at 2/12/2025  3:02 PM.  Learner: Significant Other, Patient  Readiness: Acceptance  Method: Explanation, Demonstration, Handout  Response: Verbalizes Understanding, Demonstrated Understanding    Education Comments  No comments found.    Chela Richter, PT, DPT

## 2025-02-12 NOTE — ANESTHESIA POSTPROCEDURE EVALUATION
"Patient: Alisa Bland \"Charlotte\"    Procedure Summary       Date: 02/12/25 Room / Location: [facility] 03 / [facility]    Anesthesia Start: 1019 Anesthesia Stop: 1211    Procedure: Knee Replacement Total Cement Unilat *Rapid Recovery* (Left: Knee) Diagnosis:       Unilateral primary osteoarthritis, left knee      (Unilateral primary osteoarthritis, left knee [M17.12])    Surgeons: Jaswant Marques MD Responsible Provider: Shaan Romero MD    Anesthesia Type: spinal, regional ASA Status: 2            Anesthesia Type: spinal, regional    Vitals Value Taken Time   BP 98/55 02/12/25 1212   Temp 36 °C (96.8 °F) 02/12/25 1212   Pulse 92 02/12/25 1225   Resp 16 02/12/25 1225   SpO2 98 % 02/12/25 1225       Anesthesia Post Evaluation    Patient location during evaluation: PACU  Patient participation: complete - patient participated  Level of consciousness: awake  Pain management: adequate  Airway patency: patent  Cardiovascular status: acceptable  Respiratory status: acceptable  Hydration status: acceptable  Postoperative Nausea and Vomiting: none        There were no known notable events for this encounter.    "

## 2025-02-12 NOTE — PERIOPERATIVE NURSING NOTE
Patient has eaten.    Respiratory therapy has completed IS education with patient.    Physical therapy has completed assessment and education; patient has functionally cleared for discharge.  Patient has voided.     has seen patient and home needs are arranged.     minimal complaint of pain.    no complaint of nausea.     Significant other at bedside; discussed discharge instructions with patient and Significant other. All questions at this time answered.     Pharmacy has delivered patient discharge medications.     Patient has completed requirements for discharge from [facility] Recovery Program.      Patient clinically appropriate for discharge. IV removed and patient transported to discharge area via wheelchair.

## 2025-02-12 NOTE — PROGRESS NOTES
54 yr old female admitted left knee replacement with Dr. Marques  Plan is home today with [facility] PT. SOC pending confirmed on 2/13/25.  Post op follow up on 3/28/25 at 10:00 am-Jayna  Spouse to transport home and assist with care as needed   02/12/25 1428   Discharge Planning   Living Arrangements Spouse/significant other   Support Systems Spouse/significant other   Assistance Needed transportation   Type of Residence Private residence   Number of Stairs to Enter Residence 2   Number of Stairs Within Residence 12  (with rail)   Do you have animals or pets at home? Yes   Type of Animals or Pets 2 cats   Who is requesting discharge planning? Provider   Home or Post Acute Services In home services   Type of Home Care Services Home PT   Expected Discharge Disposition Home H  ( Home Care)   Does the patient need discharge transport arranged? No   Financial Resource Strain   How hard is it for you to pay for the very basics like food, housing, medical care, and heating? Not hard   Housing Stability   In the last 12 months, was there a time when you were not able to pay the mortgage or rent on time? N   In the past 12 months, how many times have you moved where you were living? 0   At any time in the past 12 months, were you homeless or living in a shelter (including now)? N   Transportation Needs   In the past 12 months, has lack of transportation kept you from medical appointments or from getting medications? no   In the past 12 months, has lack of transportation kept you from meetings, work, or from getting things needed for daily living? No   Patient Choice   Provider Choice list and CMS website (https://medicare.gov/care-compare#search) for post-acute Quality and Resource Measure Data were provided and reviewed with: Patient   Patient / Family choosing to utilize agency / facility established prior to hospitalization No   Stroke Family Assessment   Stroke Family Assessment Needed No

## 2025-02-12 NOTE — ANESTHESIA PROCEDURE NOTES
Spinal Block    Patient location during procedure: OR  Start time: 2/12/2025 10:22 AM  End time: 2/12/2025 10:26 AM  Reason for block: primary anesthetic  Staffing  Performed: ERNESTINE   Authorized by: Shaan Romero MD    Performed by: ERNESTINE Gaines    Preanesthetic Checklist  Completed: patient identified, IV checked, risks and benefits discussed, surgical consent, monitors and equipment checked, pre-op evaluation, timeout performed and sterile techniques followed  Block Timeout  RN/Licensed healthcare professional reads aloud to the Anesthesia provider and entire team: Patient identity, procedure with side and site, patient position, and as applicable the availability of implants/special equipment/special requirements.    Timeout performed at: 2/12/2025 10:21 AM  Spinal Block  Patient position: sitting  Prep: Betadine  Sterility prep: cap, drape, gloves, hand hygiene and mask  Sedation level: light sedation  Patient monitoring: blood pressure, continuous pulse oximetry and heart rate  Approach: midline  Vertebral space: L3-4  Injection technique: single-shot  Needle  Needle type: pencil-point   Needle gauge: 24 G  Needle length: 4 in  Free flowing CSF: yes    Assessment  Sensory level: T10 bilateral  Procedure assessment: patient sedated but conversant throughout procedure and patient tolerated procedure well with no immediate complications  Additional Notes  3ml 1.5% mepivicaine PF injected  Spinal kit exp: 06-  Lot# [id number]

## 2025-02-12 NOTE — HH CARE COORDINATION
Home Care received a Referral for Physical Therapy. We have processed the referral for a Start of Care on 02/13.     If you have any questions or concerns, please feel free to contact us at [phone]. Follow the prompts, enter your five digit zip code, and you will be directed to your care team on E[facility] 3.

## 2025-02-12 NOTE — PERIOPERATIVE NURSING NOTE
Pt did well in recovery.  Pt with good movement and feeling down to bilateral knees. Pt tolerates po well. No c/o pain or ponv. Pt ready for rapid recovery.

## 2025-02-12 NOTE — ANESTHESIA PREPROCEDURE EVALUATION
"Patient: Alisa Bland \"Charlotte\"    Procedure Information       Date/Time: 02/12/25 1005    Procedure: Knee Replacement Total Cement Unilat *Rapid Recovery* (Left: Knee) - DePuy Attune Knee, Pineapple Val    Location: [facility] 03 / [facility]    Surgeons: Jaswant Marques MD            Relevant Problems   Cardiac   (+) Hyperlipidemia      Neuro   (+) Anxiety   (+) Depression      /Renal   (+) Acute UTI      Endocrine   (+) Euthyroid goiter   (+) Multinodular goiter   (+) Primary hyperparathyroidism (Multi)      Musculoskeletal   (+) Primary osteoarthritis of both knees   (+) Primary osteoarthritis of left knee   (+) Unilateral primary osteoarthritis, left knee      ID   (+) Acute UTI       Clinical information reviewed:    Allergies      OB Status           NPO Detail:  No data recorded     Physical Exam    Airway  Mallampati: III  TM distance: >3 FB  Neck ROM: full     Cardiovascular    Dental    Pulmonary    Abdominal            Anesthesia Plan    History of general anesthesia?: yes  History of complications of general anesthesia?: no    ASA 2     spinal and regional     intravenous induction   Anesthetic plan and risks discussed with patient.    Plan discussed with CAA.      "

## 2025-02-12 NOTE — PROGRESS NOTES
LORETTA Weiner, PAVicC, ATC  Orthopedic Physician Assisant  Adult Reconstruction and Total Joint Replacement  General Orthopedics  Department of Orthopaedic Surgery  [place] [id number]      JONAS TamayoC

## 2025-02-12 NOTE — PERIOPERATIVE NURSING NOTE
Patient to room 24 for RR. Team notified of patient arrival.      Bedside handoff report done in PACU. Patient transported via cart to room.      Plan of day discussed with patient and family; all questions answered at this time. Patient and family verbalized understanding that patient is not to ambulate without RN at bedside. Bed locked and lowered, call light in reach. Patient safety maintained.     Orders reviewed and released.

## 2025-02-12 NOTE — PERIOPERATIVE NURSING NOTE
Patient has eaten.    Respiratory therapy has completed IS education with patient.    Physical therapy has completed assessment and education; patient has functionally cleared for discharge.  Patient has voided.     has seen patient and home needs are arranged.     minimal complaint of pain.    no complaint of nausea.     Significant other at bedside; discussed discharge instructions with patient and Significant other. All questions at this time answered.     Drain removed by RN.    Pharmacy has delivered patient discharge medications.     Patient has completed requirements for discharge from [facility].      Patient clinically appropriate for discharge. IV removed and patient transported to discharge area via wheelchair.

## 2025-02-13 ENCOUNTER — HOME CARE VISIT (OUTPATIENT)
Dept: HOME HEALTH SERVICES | Facility: HOME HEALTH | Age: 55
End: 2025-02-13
Payer: COMMERCIAL

## 2025-02-13 VITALS
HEART RATE: 72 BPM | RESPIRATION RATE: 16 BRPM | SYSTOLIC BLOOD PRESSURE: 102 MMHG | TEMPERATURE: 98.4 F | DIASTOLIC BLOOD PRESSURE: 60 MMHG

## 2025-02-13 PROCEDURE — G0151 HHCP-SERV OF PT,EA 15 MIN: HCPCS

## 2025-02-13 SDOH — HEALTH STABILITY: PHYSICAL HEALTH: EXERCISE TYPE: SEE COMMENTS BELOW

## 2025-02-13 SDOH — HEALTH STABILITY: PHYSICAL HEALTH
EXERCISE COMMENTS: SUPINE QUAD AND GLUT SETS, ANKLE PUMPS X 10 REPS  STEP STRETCHES LEFT KNEE FLEXION X 5 REPS  SITTING LEFT KNEE FLEXION AROM, AAROM X 5 REPS

## 2025-02-13 ASSESSMENT — ENCOUNTER SYMPTOMS
LOWEST PAIN SEVERITY IN PAST 24 HOURS: 4/10
PERSON REPORTING PAIN: PATIENT
HIGHEST PAIN SEVERITY IN PAST 24 HOURS: 6/10
PAIN LOCATION - RELIEVING FACTORS: REST, ICE, ELEVATION
PAIN LOCATION - PAIN QUALITY: SORE
PAIN: 1
PAIN LOCATION: LEFT KNEE
SUBJECTIVE PAIN PROGRESSION: WAXING AND WANING
PAIN LOCATION - PAIN SEVERITY: 6/10
PAIN LOCATION - PAIN FREQUENCY: FREQUENT
PAIN LOCATION - EXACERBATING FACTORS: MVT
LIMITED RANGE OF MOTION: 1
MUSCLE WEAKNESS: 1
PAIN SEVERITY GOAL: 0/10
PAIN LOCATION - PAIN DURATION: CONSTANT

## 2025-02-13 ASSESSMENT — ACTIVITIES OF DAILY LIVING (ADL)
AMBULATION_DISTANCE/DURATION_TOLERATED: 100
CURRENT_FUNCTION: STAND BY ASSIST
PHYSICAL TRANSFERS ASSESSED: 1
OASIS_M1830: 05
ENTERING_EXITING_HOME: MINIMUM ASSIST
AMBULATION ASSISTANCE ON FLAT SURFACES: 1
AMBULATION ASSISTANCE: STAND BY ASSIST
AMBULATION ASSISTANCE: 1

## 2025-02-14 NOTE — CASE COMMUNICATION
Pt and  Eddie in the home  Primary Reason for Home Care:  left TKR  Skilled Needs:  Skilled PT for educ on pain control, fall prev,safety, gait progression, ROM, HEP, steps and wound care education  Instruction provided:  HEP, gait, ROM, bed mobility, pain control, fall prev  Pt and  response to instruction:  receptive, appreciative  Pt and  instructed on plan of care and visit frequency. Will dc according to outpt P T  Pt and  in agreement with plan of care and visit frequency.  1w1, 3w1, 2w1(may dc earlier if outpt PT scheduled, pt has called)  Discipline Communication: MD  Plan for next visit: HEP, ROM, gait, assess pain and integrity of Mepilex    Medication Reconciliation: none    Demonstrates Adherence : yes  Issues/Concerns:no  Pill bottles visualized during treatment  yes

## 2025-02-17 ENCOUNTER — HOME CARE VISIT (OUTPATIENT)
Dept: HOME HEALTH SERVICES | Facility: HOME HEALTH | Age: 55
End: 2025-02-17
Payer: COMMERCIAL

## 2025-02-17 VITALS — TEMPERATURE: 97.7 F | RESPIRATION RATE: 16 BRPM

## 2025-02-17 PROCEDURE — G0151 HHCP-SERV OF PT,EA 15 MIN: HCPCS

## 2025-02-17 SDOH — HEALTH STABILITY: PHYSICAL HEALTH: EXERCISE COMMENTS: FOR KNEE FLEXION

## 2025-02-17 SDOH — HEALTH STABILITY: PHYSICAL HEALTH: EXERCISE TYPE: SEE COMMENT BELOW

## 2025-02-17 SDOH — HEALTH STABILITY: PHYSICAL HEALTH
EXERCISE COMMENTS: SUPINE ELEVATED QUAD SETS X 20 REPS, GLUT SETS  SAQ X 5 WITH PAIN  LAQ X 10 WITH PAIN  STANDING HEEL RAISES, SLR, MARCHING X 15 REPS  STEP STRETCH FLEX AND EXT X 5 REPS, 10 SECOND HOLD  SITTING IN ROCKING CHAIR FOR 5 MINUTES, PROM X 5 BY PT, AROM X 5

## 2025-02-17 ASSESSMENT — ENCOUNTER SYMPTOMS
PAIN LOCATION - PAIN SEVERITY: 3/10
PAIN LOCATION: LEFT KNEE
MUSCLE WEAKNESS: 1
LOWEST PAIN SEVERITY IN PAST 24 HOURS: 3/10
PAIN: 1
PAIN LOCATION - PAIN DURATION: VARIES
SUBJECTIVE PAIN PROGRESSION: GRADUALLY IMPROVING
PAIN LOCATION - EXACERBATING FACTORS: KNEE FLEXION
PERSON REPORTING PAIN: PATIENT
LIMITED RANGE OF MOTION: 1
PAIN LOCATION - RELIEVING FACTORS: REST, ICE, MEDS
PAIN LOCATION - PAIN FREQUENCY: FREQUENT
PAIN SEVERITY GOAL: 0/10
HIGHEST PAIN SEVERITY IN PAST 24 HOURS: 5/10

## 2025-02-17 ASSESSMENT — ACTIVITIES OF DAILY LIVING (ADL)
AMBULATION_DISTANCE/DURATION_TOLERATED: 100
AMBULATION ASSISTANCE: 1
AMBULATION ASSISTANCE ON FLAT SURFACES: 1
AMBULATION ASSISTANCE: STAND BY ASSIST

## 2025-02-19 ENCOUNTER — HOME CARE VISIT (OUTPATIENT)
Dept: HOME HEALTH SERVICES | Facility: HOME HEALTH | Age: 55
End: 2025-02-19
Payer: COMMERCIAL

## 2025-02-19 VITALS — RESPIRATION RATE: 16 BRPM | TEMPERATURE: 97.8 F

## 2025-02-19 PROCEDURE — G0151 HHCP-SERV OF PT,EA 15 MIN: HCPCS

## 2025-02-19 SDOH — HEALTH STABILITY: PHYSICAL HEALTH: EXERCISE TYPE: SEE COMMENTS BELOW

## 2025-02-19 SDOH — HEALTH STABILITY: PHYSICAL HEALTH
EXERCISE COMMENTS: SUPINE DANGLE 5 MIN, PROM X 5 REPS  SUPINE ELEVATED QUAD SETS X 20, SLR AND SAQ X 10 REPS  SITTING KNEE FLEX X 5 AROM, X 5 PROM, FORWARD SCOOT X 5 AAROM  SITTING LEFT LAQ X 10  STANDING HEEL RAISES, MARCHING, SLR, HAMSTRING CURLS X 15 REPS

## 2025-02-19 ASSESSMENT — ACTIVITIES OF DAILY LIVING (ADL)
AMBULATION ASSISTANCE: INDEPENDENT
AMBULATION_DISTANCE/DURATION_TOLERATED: 150 FEET
PHYSICAL TRANSFERS ASSESSED: 1
CURRENT_FUNCTION: INDEPENDENT
AMBULATION ASSISTANCE: 1
AMBULATION ASSISTANCE ON FLAT SURFACES: 1

## 2025-02-19 ASSESSMENT — ENCOUNTER SYMPTOMS
MUSCLE WEAKNESS: 1
PAIN LOCATION - RELIEVING FACTORS: REST, ICE, MEDS
PAIN: 1
SUBJECTIVE PAIN PROGRESSION: GRADUALLY IMPROVING
PERSON REPORTING PAIN: PATIENT
PAIN LOCATION: LEFT KNEE
PAIN LOCATION - PAIN DURATION: VARIES
LIMITED RANGE OF MOTION: 1
PAIN SEVERITY GOAL: 0/10
PAIN LOCATION - PAIN SEVERITY: 5/10
LOWEST PAIN SEVERITY IN PAST 24 HOURS: 2/10
HIGHEST PAIN SEVERITY IN PAST 24 HOURS: 5/10
PAIN LOCATION - EXACERBATING FACTORS: KNEE FLEXION
PAIN LOCATION - PAIN FREQUENCY: FREQUENT
PAIN LOCATION - PAIN QUALITY: SORE

## 2025-02-21 ENCOUNTER — HOME CARE VISIT (OUTPATIENT)
Dept: HOME HEALTH SERVICES | Facility: HOME HEALTH | Age: 55
End: 2025-02-21
Payer: COMMERCIAL

## 2025-02-21 VITALS — TEMPERATURE: 96.8 F | RESPIRATION RATE: 16 BRPM

## 2025-02-21 PROCEDURE — G0151 HHCP-SERV OF PT,EA 15 MIN: HCPCS

## 2025-02-21 SDOH — HEALTH STABILITY: PHYSICAL HEALTH
EXERCISE COMMENTS: SUPINE LEFT SLR 10 REPS, SLR WITH ANKLE DORSI X 10 , SLR WITH QUAD SETS X 10  SAQ LEFT X 30 REPS  ELEVATED QUAD SETS X 30 REPS  PRONE KNEE EXT  2 MINUTES, PRONE LEFT KNEE FLEXION X 10 REPS AROM, PROM X 5 REPS  SUPINE LEFT  KNEE DANGLE X 2 MIN  STANDI

## 2025-02-21 SDOH — HEALTH STABILITY: PHYSICAL HEALTH: EXERCISE TYPE: SEE COMMENTS BELOW

## 2025-02-21 SDOH — HEALTH STABILITY: PHYSICAL HEALTH: EXERCISE COMMENTS: NG LEFT HAMSTRING CURLS 15 REPS, HEEL RAISES X 15 REPS

## 2025-02-21 ASSESSMENT — ACTIVITIES OF DAILY LIVING (ADL)
AMBULATION ASSISTANCE ON FLAT SURFACES: 1
AMBULATION ASSISTANCE: 1
PHYSICAL TRANSFERS ASSESSED: 1

## 2025-02-21 ASSESSMENT — ENCOUNTER SYMPTOMS
PERSON REPORTING PAIN: PATIENT
PAIN LOCATION: LEFT KNEE
MUSCLE WEAKNESS: 1
PAIN LOCATION - PAIN FREQUENCY: INTERMITTENT
PAIN LOCATION - PAIN DURATION: VARIES
SUBJECTIVE PAIN PROGRESSION: GRADUALLY IMPROVING
PAIN LOCATION - RELIEVING FACTORS: REST, ICE, MEDS
PAIN: 1
LIMITED RANGE OF MOTION: 1
PAIN LOCATION - PAIN SEVERITY: 2/10
LOWEST PAIN SEVERITY IN PAST 24 HOURS: 2/10
PAIN SEVERITY GOAL: 0/10
HIGHEST PAIN SEVERITY IN PAST 24 HOURS: 5/10
PAIN LOCATION - PAIN QUALITY: SORE
PAIN LOCATION - EXACERBATING FACTORS: MVT

## 2025-02-24 ENCOUNTER — HOME CARE VISIT (OUTPATIENT)
Dept: HOME HEALTH SERVICES | Facility: HOME HEALTH | Age: 55
End: 2025-02-24
Payer: COMMERCIAL

## 2025-02-24 VITALS — TEMPERATURE: 98 F | RESPIRATION RATE: 16 BRPM

## 2025-02-24 DIAGNOSIS — Z96.652 S/P TKR (TOTAL KNEE REPLACEMENT) USING CEMENT, LEFT: Primary | ICD-10-CM

## 2025-02-24 PROCEDURE — G0151 HHCP-SERV OF PT,EA 15 MIN: HCPCS

## 2025-02-24 RX ORDER — TRAMADOL HYDROCHLORIDE 50 MG/1
50 TABLET ORAL EVERY 6 HOURS PRN
Qty: 28 TABLET | Refills: 0 | Status: SHIPPED | OUTPATIENT
Start: 2025-02-24 | End: 2025-03-03

## 2025-02-24 RX ORDER — OXYCODONE HYDROCHLORIDE 5 MG/1
5 TABLET ORAL EVERY 6 HOURS PRN
Qty: 28 TABLET | Refills: 0 | Status: SHIPPED | OUTPATIENT
Start: 2025-02-24 | End: 2025-03-03

## 2025-02-24 SDOH — HEALTH STABILITY: PHYSICAL HEALTH: EXERCISE TYPE: SEE COMMENTS BELOW

## 2025-02-24 SDOH — HEALTH STABILITY: PHYSICAL HEALTH: EXERCISE COMMENTS: SITTING LEFT KNEE FLEX  X 5 AROM, AAROM X 5  LONG SITTING 3 MIN

## 2025-02-24 SDOH — HEALTH STABILITY: PHYSICAL HEALTH
EXERCISE COMMENTS: LEFT SAQ X 30 REPS,  2.5 POUNDS  LEFT SLR  3 INCH X 10, 3 PAUSE LOWER X 10 AND 3 PAUSE LIFT X 10 REPS  LEFT HEEL SLIDES X 10, HOLD X 5 SECONDS EACH  PRONE LEFT KNEE FLEXION X 10 AROM, AAROM X 10 WITH YOGA STRAP  SITTING LEFT LAQ 2.5 POUNDS X 30 REPS

## 2025-02-24 ASSESSMENT — ENCOUNTER SYMPTOMS
PAIN LOCATION - PAIN QUALITY: SORE, ACHEY
PAIN LOCATION - PAIN FREQUENCY: INTERMITTENT
PAIN LOCATION: LEFT KNEE
PAIN LOCATION - EXACERBATING FACTORS: MVT, STANDING
PAIN LOCATION - RELIEVING FACTORS: REST, ICE, MEDS
LOWEST PAIN SEVERITY IN PAST 24 HOURS: 0/10
PERSON REPORTING PAIN: PATIENT
LIMITED RANGE OF MOTION: 1
MUSCLE WEAKNESS: 1
PAIN LOCATION - PAIN SEVERITY: 3/10
PAIN: 1
HIGHEST PAIN SEVERITY IN PAST 24 HOURS: 3/10
PAIN SEVERITY GOAL: 0/10
SUBJECTIVE PAIN PROGRESSION: RAPIDLY IMPROVING
PAIN LOCATION - PAIN DURATION: VARIES

## 2025-02-24 ASSESSMENT — ACTIVITIES OF DAILY LIVING (ADL)
PHYSICAL TRANSFERS ASSESSED: 1
AMBULATION_DISTANCE/DURATION_TOLERATED: 200
AMBULATION ASSISTANCE: SUPERVISION
AMBULATION ASSISTANCE: 1
AMBULATION ASSISTANCE ON FLAT SURFACES: 1
CURRENT_FUNCTION: INDEPENDENT

## 2025-02-26 ENCOUNTER — HOME CARE VISIT (OUTPATIENT)
Dept: HOME HEALTH SERVICES | Facility: HOME HEALTH | Age: 55
End: 2025-02-26
Payer: COMMERCIAL

## 2025-02-26 VITALS
RESPIRATION RATE: 16 BRPM | TEMPERATURE: 98 F | SYSTOLIC BLOOD PRESSURE: 118 MMHG | HEART RATE: 76 BPM | DIASTOLIC BLOOD PRESSURE: 68 MMHG

## 2025-02-26 PROCEDURE — G0151 HHCP-SERV OF PT,EA 15 MIN: HCPCS

## 2025-02-26 SDOH — HEALTH STABILITY: PHYSICAL HEALTH: EXERCISE TYPE: SEE COMMENTS BELOW

## 2025-02-26 SDOH — HEALTH STABILITY: PHYSICAL HEALTH
EXERCISE COMMENTS: SUPINE SLR, SAQ X 30 REPS, SUPINE SLR AND KNEE TO CHEST STRETCHES WITH YOGA BAND X 10 REPS  PRONE LEFT KNEE FLEXION STRETCHES WITH YOGA BAND X 10 REPS, AROM X 10 REPS  SITTING LEFT LAQ X 30 REPS, SITTING LEFT KNEE FLEX X 10 REPS AROM, PROM X 5 REPS

## 2025-02-26 SDOH — HEALTH STABILITY: PHYSICAL HEALTH
EXERCISE COMMENTS: STEP STRETCHES X 5 FOR FLEX AND EXT  STANDING HAMSTRING CURLS WITH YOGA STRAP X 10 REPS, HEEL RAISES AND MARCHING X 15, HEEL RAISES X 20 REPS

## 2025-02-26 ASSESSMENT — ENCOUNTER SYMPTOMS
LIMITED RANGE OF MOTION: 1
HIGHEST PAIN SEVERITY IN PAST 24 HOURS: 3/10
PAIN SEVERITY GOAL: 0/10
PAIN: 1
PAIN LOCATION - PAIN FREQUENCY: INTERMITTENT
PERSON REPORTING PAIN: PATIENT
LOWEST PAIN SEVERITY IN PAST 24 HOURS: 0/10
PAIN LOCATION - PAIN DURATION: VARIES
PAIN LOCATION: LEFT KNEE
PAIN LOCATION - EXACERBATING FACTORS: MVT
PAIN LOCATION - RELIEVING FACTORS: REST, ICE, MEDS
MUSCLE WEAKNESS: 1
SUBJECTIVE PAIN PROGRESSION: GRADUALLY WORSENING
PAIN LOCATION - PAIN QUALITY: ACHEY
PAIN LOCATION - PAIN SEVERITY: 1/10

## 2025-02-26 ASSESSMENT — ACTIVITIES OF DAILY LIVING (ADL)
AMBULATION ASSISTANCE: INDEPENDENT
CURRENT_FUNCTION: INDEPENDENT
AMBULATION ASSISTANCE: 1
PHYSICAL TRANSFERS ASSESSED: 1
AMBULATION ASSISTANCE ON FLAT SURFACES: 1
OASIS_M1830: 00
HOME_HEALTH_OASIS: 00

## 2025-03-03 ENCOUNTER — EVALUATION (OUTPATIENT)
Dept: PHYSICAL THERAPY | Facility: CLINIC | Age: 55
End: 2025-03-03
Payer: COMMERCIAL

## 2025-03-03 DIAGNOSIS — Z96.652 S/P TOTAL KNEE ARTHROPLASTY, LEFT: ICD-10-CM

## 2025-03-03 PROCEDURE — 97161 PT EVAL LOW COMPLEX 20 MIN: CPT | Mod: GP | Performed by: PHYSICAL THERAPIST

## 2025-03-03 PROCEDURE — 97535 SELF CARE MNGMENT TRAINING: CPT | Mod: GP | Performed by: PHYSICAL THERAPIST

## 2025-03-03 ASSESSMENT — COLUMBIA-SUICIDE SEVERITY RATING SCALE - C-SSRS
2. HAVE YOU ACTUALLY HAD ANY THOUGHTS OF KILLING YOURSELF?: NO
6. HAVE YOU EVER DONE ANYTHING, STARTED TO DO ANYTHING, OR PREPARED TO DO ANYTHING TO END YOUR LIFE?: NO
1. IN THE PAST MONTH, HAVE YOU WISHED YOU WERE DEAD OR WISHED YOU COULD GO TO SLEEP AND NOT WAKE UP?: NO

## 2025-03-03 ASSESSMENT — ENCOUNTER SYMPTOMS
DEPRESSION: 0
LOSS OF SENSATION IN FEET: 0
OCCASIONAL FEELINGS OF UNSTEADINESS: 0

## 2025-03-03 NOTE — PROGRESS NOTES
Physical Therapy Evaluation    Patient Name: Alisa Bland  MRN: [id number]  Today's Date: 3/3/2025  Referred by: Fiorella Rodriguez PA-C  Visit: 1/25    Time Calculation  Start Time: 1000  Stop Time: 1045  Time Calculation (min): 45 min  Diagnosis:  1. S/P total knee arthroplasty, left  Referral to Physical Therapy    Follow Up In Physical Therapy        PRECAUTIONS:   Left TKA 2/12/25  Fall Risk: Low    SUBJECTIVE:  Patient with long history of left knee pain from OA, failed conservative measures and presents s/p left TKA performed 2/12/25, overall pleased with progress to this point, soreness controlled, exercises going well, will be returning to work part-time tomorrow.  Pain:  1-6/10 left knee  Home Living:  Multi-story home, lives with , works as   Prior level of function:  Bilateral knee OA  Personal Factors Impacting Care:  None    OBJECTIVE:  Knee AROM: (degrees) Left Right   Flexion 110    Extension 0      Knee PROM: (degrees) Left Right   Flexion 115    Extension 0      Knee Strength: MMT Left Right   Flexion 4/5 /5   Extension 4-/5 /5   *In available ROM    Hip Strength: MMT Left Right   Flexion 4/5 /5   Extension 4/5 /5   Abduction 4-/5 /5   External Rotation 4-/5 /5   *denotes pain with motion or muscle testing    Swelling/Girth:  Mid patella level: right 40 cm / left 43 cm  Gait:  Slightly antalgic, uses cane intermittently throughout the day  Palpation:  Generalized soreness left knee  Patellar mobility:  Good  Functional Outcome Measure:  KOS: 16/28    ASSESSMENT:  Patient presents 2.5 weeks s/p left TKA, overall doing very well, excellent ROM and minimal swelling noted, therapy will focus on improving LLE strength and normalizing gait to help decrease stress on arthritic right knee.    Patient presents with the following deficits/problems that indicate the need for skilled PT: LLE weakness, knee stiffness, gait dysfunction.     Low complexity due to patient's clinical presentation being  stable and uncomplicated by any significant comorbidities that may affect rehab tolerance and progression.     Clinical presentation:  Stable and/or uncomplicated characteristics    TREATMENT:  Initial evaluation performed followed by discussion of findings and instruction in HEP.    PATIENT EDUCATION:  Access Code: [email or web address]  URL: [email or web address]  Date: 03/03/2025  Prepared by: Tomás Khan    Exercises  - Active Straight Leg Raise with Quad Set  - 1 x daily - 7 x weekly - 3 sets - 10 reps  - Supine Bridge  - 1 x daily - 7 x weekly - 3 sets - 10 reps  - Sidelying Hip Abduction  - 1 x daily - 7 x weekly - 3 sets - 10 reps  - Clamshell  - 1 x daily - 7 x weekly - 3 sets - 10 reps  - Prone Quadriceps Stretch with Strap  - 1 x daily - 7 x weekly - 3 sets - 10 reps  - Supine Heel Slide with Strap  - 1 x daily - 7 x weekly - 3 sets - 10 reps  - Seated Long Arc Quad  - 1 x daily - 7 x weekly - 3 sets - 10 reps  - Mini Squat with Counter Support  - 1 x daily - 7 x weekly - 3 sets - 10 reps  - Standing Heel Raises  - 1 x daily - 7 x weekly - 2 sets - 30 reps    PLAN:   HEP daily, PT twice weekly    Rehab potential:  Good  Plan of care agreement  Patient    GOALS:  Active       PT Problem       Decrease left knee pain to 1/10 at worst with activity       Start:  03/03/25    Expected End:  05/12/25            Improve left knee AROM to at least 0-120 degrees       Start:  03/03/25    Expected End:  05/12/25            Improve LLE strength to at least 4+/5       Start:  03/03/25    Expected End:  05/12/25            Patient able to walk community distances with good form, independent with HEP       Start:  03/03/25    Expected End:  05/12/25            Patient Stated Goal: get leg stronger and knee bend better       Start:  03/03/25    Expected End:  05/12/25

## 2025-03-05 ENCOUNTER — APPOINTMENT (OUTPATIENT)
Dept: ENDOCRINOLOGY | Facility: CLINIC | Age: 55
End: 2025-03-05
Payer: COMMERCIAL

## 2025-03-07 ENCOUNTER — APPOINTMENT (OUTPATIENT)
Dept: PRIMARY CARE | Facility: CLINIC | Age: 55
End: 2025-03-07
Payer: COMMERCIAL

## 2025-03-07 ENCOUNTER — TREATMENT (OUTPATIENT)
Dept: PHYSICAL THERAPY | Facility: CLINIC | Age: 55
End: 2025-03-07
Payer: COMMERCIAL

## 2025-03-07 DIAGNOSIS — Z96.652 S/P TOTAL KNEE ARTHROPLASTY, LEFT: ICD-10-CM

## 2025-03-07 PROCEDURE — 97110 THERAPEUTIC EXERCISES: CPT | Mod: GP | Performed by: PHYSICAL THERAPIST

## 2025-03-07 NOTE — PROGRESS NOTES
"  Physical Therapy Treatment    Patient Name: Alisa Bland  MRN: [id number]  Today's Date: 3/7/2025  Visit: 2/25    Time Calculation  Start Time: 0915  Stop Time: 1015  Time Calculation (min): 60 min  Diagnosis:   1. S/P total knee arthroplasty, left  Follow Up In Physical Therapy        PRECAUTIONS:  Left TKA 2/12/25  Fall Risk: Low    SUBJECTIVE:  Patient reports main complaint of ache in the knee, is taking Advil and Tylenol to treat, did return to work this week 6 hours/day.    Pain: 2/10 left knee  Compliant with HEP? Yes    OBJECTIVE:  Left knee PROM 0-122 degrees    TREATMENT:  - Therex:   Rec bike x 6' L6  Tilt board calf stretch 5x30\"  HS stretch 3x30\"  Knee flexion stretch 3x30\"  Calf raise x 30  TG squat 3x10 L7  HS curl 3x10 20#  LAQ 2x15 3#  SLR 3x10 1#  - Manual Therapy:  Left patellar mobs, tibial glides  - Modalities:    CP x 10' left knee    ASSESSMENT:  Patient with excellent ROM, good tolerance to exercises but challenged by strengthening.    EDUCATION:  Continue with HEP, add weights as tolerated.    PLAN:   HEP daily, PT 1-2 times/week.       "

## 2025-03-11 ENCOUNTER — TREATMENT (OUTPATIENT)
Dept: PHYSICAL THERAPY | Facility: CLINIC | Age: 55
End: 2025-03-11
Payer: COMMERCIAL

## 2025-03-11 DIAGNOSIS — Z96.652 S/P TOTAL KNEE ARTHROPLASTY, LEFT: ICD-10-CM

## 2025-03-11 PROCEDURE — 97110 THERAPEUTIC EXERCISES: CPT | Mod: GP | Performed by: PHYSICAL THERAPIST

## 2025-03-11 NOTE — PROGRESS NOTES
"  Physical Therapy Treatment    Patient Name: Alisa Bland  MRN: [id number]  Today's Date: 3/11/2025  Visit: 3/25    Time Calculation  Start Time: 0915  Stop Time: 1015  Time Calculation (min): 60 min  Diagnosis:   1. S/P total knee arthroplasty, left  Follow Up In Physical Therapy        PRECAUTIONS:  Left TKA 2/12/25  Fall Risk: Low    SUBJECTIVE:  Patient reports she tolerated last session well, continues to be tired after work day but is getting her exercises in, biggest c/o stiffness.    Pain: 2/10 left knee  Compliant with HEP? Yes    OBJECTIVE:  Left knee PROM 0-122 degrees    TREATMENT:  - Therex:   Rec bike x 8' L6  Tilt board calf stretch 5x30\"  HS stretch 3x30\"  Knee flexion stretch 3x30\"  Calf raise x 30  Step ups 2x10 8\"  TG squat 3x10 L7+1  HS curl 3x10 20#  SLS 10x10\"  LAQ 3x15 3#  SLR 3x12 1#  - Manual Therapy:  Left patellar mobs, tibial glides  - Modalities:    CP x 10' left knee    ASSESSMENT:  Good tolerance to increased exercise today, slight swelling remains, excellent ROM.    EDUCATION:  Continue with HEP, add weights as tolerated.    PLAN:   HEP daily, PT 1-2 times/week.       "

## 2025-03-13 ENCOUNTER — TREATMENT (OUTPATIENT)
Dept: PHYSICAL THERAPY | Facility: CLINIC | Age: 55
End: 2025-03-13
Payer: COMMERCIAL

## 2025-03-13 DIAGNOSIS — Z96.652 S/P TOTAL KNEE ARTHROPLASTY, LEFT: ICD-10-CM

## 2025-03-13 PROCEDURE — 97110 THERAPEUTIC EXERCISES: CPT | Mod: GP | Performed by: PHYSICAL THERAPIST

## 2025-03-13 NOTE — PROGRESS NOTES
"  Physical Therapy Treatment    Patient Name: Alisa Bland  MRN: [id number]  Today's Date: 3/13/2025  Visit: 4/25    Time Calculation  Start Time: 1045  Stop Time: 1140  Time Calculation (min): 55 min  Diagnosis:   1. S/P total knee arthroplasty, left  Follow Up In Physical Therapy        PRECAUTIONS:  Left TKA 2/12/25  Fall Risk: Low    SUBJECTIVE:  Patient reports she noticed some increased welling after last session, soreness tolerable, able to get on her [place] for 10 min yesterday and tolerated well.    Pain: 2/10 left knee  Compliant with HEP? Yes    OBJECTIVE:  Left knee PROM 0-125 degrees    TREATMENT:  - Therex:   Upright bike x 8' L2-3  Tilt board calf stretch 5x30\"  HS stretch 3x30\"  Knee flexion stretch 3x30\"  Calf raise x 30  Step ups 3x10 8\"  TG squat 3x10 L7+1  HS curl 3x10 20#  Airex SLS 10x10\"  LAQ 3x15 3#  SLR 3x12 1#  - Manual Therapy:  Left patellar mobs, tibial glides  - Modalities:    CP x 10' left knee    ASSESSMENT:  Discussed sight increase in swelling and soreness after exercises is expected as long as it doesn't become a increasing problem, tolerated exercises well today.    EDUCATION:  Continue with HEP, gradually increase cycle.    PLAN:   HEP daily, PT 1-2 times/week.       "

## 2025-03-18 ENCOUNTER — APPOINTMENT (OUTPATIENT)
Dept: PHYSICAL THERAPY | Facility: CLINIC | Age: 55
End: 2025-03-18
Payer: COMMERCIAL

## 2025-03-18 DIAGNOSIS — Z96.652 S/P TOTAL KNEE ARTHROPLASTY, LEFT: ICD-10-CM

## 2025-03-20 ENCOUNTER — APPOINTMENT (OUTPATIENT)
Dept: PHYSICAL THERAPY | Facility: CLINIC | Age: 55
End: 2025-03-20
Payer: COMMERCIAL

## 2025-03-20 DIAGNOSIS — Z96.652 S/P TOTAL KNEE ARTHROPLASTY, LEFT: ICD-10-CM

## 2025-03-21 ENCOUNTER — APPOINTMENT (OUTPATIENT)
Dept: ORTHOPEDIC SURGERY | Facility: CLINIC | Age: 55
End: 2025-03-21
Payer: COMMERCIAL

## 2025-03-21 DIAGNOSIS — Z96.652 S/P TOTAL KNEE ARTHROPLASTY, LEFT: ICD-10-CM

## 2025-03-25 ENCOUNTER — APPOINTMENT (OUTPATIENT)
Dept: PHYSICAL THERAPY | Facility: CLINIC | Age: 55
End: 2025-03-25
Payer: COMMERCIAL

## 2025-03-25 DIAGNOSIS — Z96.652 S/P TOTAL KNEE ARTHROPLASTY, LEFT: ICD-10-CM

## 2025-03-27 ENCOUNTER — TREATMENT (OUTPATIENT)
Dept: PHYSICAL THERAPY | Facility: CLINIC | Age: 55
End: 2025-03-27
Payer: COMMERCIAL

## 2025-03-27 DIAGNOSIS — Z96.652 S/P TOTAL KNEE ARTHROPLASTY, LEFT: ICD-10-CM

## 2025-03-27 PROCEDURE — 97110 THERAPEUTIC EXERCISES: CPT | Mod: GP | Performed by: PHYSICAL THERAPIST

## 2025-03-27 NOTE — PROGRESS NOTES
"  Physical Therapy Treatment    Patient Name: Alisa Bland  MRN: [id number]  Today's Date: 3/27/2025  Visit: 5/25    Time Calculation  Start Time: 1130  Stop Time: 1225  Time Calculation (min): 55 min  Diagnosis:   1. S/P total knee arthroplasty, left  Follow Up In Physical Therapy        PRECAUTIONS:  Left TKA 2/12/25  Fall Risk: Low    SUBJECTIVE:  Patient reports had a bad flu for 7-8 days last week, did exercises as she could, no soreness but knee feels a little stiff.    Pain: 1/10 left knee  Compliant with HEP? Yes    OBJECTIVE:  Left knee PROM 0-125 degrees    TREATMENT:  - Therex:   Upright bike x 8' L2  Tilt board calf stretch 5x30\"  HS stretch 3x30\"  Knee flexion stretch 3x30\"  Calf raise x 30  Step ups 3x10 8\"  TG squat 3x10 L7+2  HS curl 3x10 30#  Airex SLS 10x10\"  LAQ 3x15 3#  SLR 3x10 1#  - Manual Therapy:  Left patellar mobs, tibial glides  - Modalities:    CP x 10' left knee    ASSESSMENT:  Tolerated return to exercises well, good knee ROM, will benefit from continued strengthening.    EDUCATION:  Continue with HEP, gradually increase cycle.    PLAN:   HEP daily, PT 1-2 times/week.       "

## 2025-03-28 ENCOUNTER — APPOINTMENT (OUTPATIENT)
Dept: ORTHOPEDIC SURGERY | Facility: CLINIC | Age: 55
End: 2025-03-28
Payer: COMMERCIAL

## 2025-04-01 ENCOUNTER — TREATMENT (OUTPATIENT)
Dept: PHYSICAL THERAPY | Facility: CLINIC | Age: 55
End: 2025-04-01
Payer: COMMERCIAL

## 2025-04-01 DIAGNOSIS — Z96.652 S/P TOTAL KNEE ARTHROPLASTY, LEFT: ICD-10-CM

## 2025-04-01 PROCEDURE — 97110 THERAPEUTIC EXERCISES: CPT | Mod: GP | Performed by: PHYSICAL THERAPIST

## 2025-04-01 NOTE — PROGRESS NOTES
"  Physical Therapy Treatment    Patient Name: Alisa Bland  MRN: [id number]  Today's Date: 4/1/2025  Visit: 5/25    Time Calculation  Start Time: 0915  Stop Time: 1015  Time Calculation (min): 60 min  Diagnosis:   1. S/P total knee arthroplasty, left  Follow Up In Physical Therapy        PRECAUTIONS:  Left TKA 2/12/25  Fall Risk: Low    SUBJECTIVE:  Patient reports she continues to get ache in the knee/leg throughout the work day, frustrated by fatigue and continued achiness, uses Advil as needed.    Pain: 1/10 left knee  Compliant with HEP? Yes    OBJECTIVE:  Left knee PROM 0-126 degrees    TREATMENT:  - Therex:   Upright bike x 8' L2  Tilt board calf stretch 5x30\"  HS stretch 3x30\"  Knee flexion stretch 3x30\"  Calf raise x 30  Step ups 3x10 8\"  Forward step down 3x10 4\"  TG squat 3x15 L7+2.5  HS curl 3x12 30#  Total hip abd 2x10 ea L3  Airex SLS 10x10\"  LAQ 3x15 4#  SLR 3x10 1#  - Manual Therapy:  Left patellar mobs, tibial glides  - Modalities:    CP x 10' left knee    ASSESSMENT:  Discussed that fatigue and achiness are very normal for this point in recovery, best to gradually increase activity levels and stay with progressive strengthening, overall she is doing extremely well for roughly 6 weeks post-op.    EDUCATION:  Continue with HEP    PLAN:   HEP daily, PT 1-2 times/week.       "

## 2025-04-02 ENCOUNTER — PATIENT MESSAGE (OUTPATIENT)
Dept: ENDOCRINOLOGY | Facility: CLINIC | Age: 55
End: 2025-04-02
Payer: COMMERCIAL

## 2025-04-02 LAB
25(OH)D3+25(OH)D2 SERPL-MCNC: 53 NG/ML (ref 30–100)
ALBUMIN SERPL-MCNC: 4.2 G/DL (ref 3.6–5.1)
ALBUMIN SERPL-MCNC: 4.2 G/DL (ref 3.6–5.1)
ALP SERPL-CCNC: 64 U/L (ref 37–153)
ALP SERPL-CCNC: 65 U/L (ref 37–153)
ALT SERPL-CCNC: 15 U/L (ref 6–29)
ALT SERPL-CCNC: 15 U/L (ref 6–29)
ANION GAP SERPL CALCULATED.4IONS-SCNC: 8 MMOL/L (CALC) (ref 7–17)
ANION GAP SERPL CALCULATED.4IONS-SCNC: 8 MMOL/L (CALC) (ref 7–17)
AST SERPL-CCNC: 17 U/L (ref 10–35)
AST SERPL-CCNC: 18 U/L (ref 10–35)
BASOPHILS # BLD AUTO: 70 CELLS/UL (ref 0–200)
BASOPHILS NFR BLD AUTO: 1.1 %
BILIRUB SERPL-MCNC: 0.4 MG/DL (ref 0.2–1.2)
BILIRUB SERPL-MCNC: 0.4 MG/DL (ref 0.2–1.2)
BUN SERPL-MCNC: 16 MG/DL (ref 7–25)
BUN SERPL-MCNC: 16 MG/DL (ref 7–25)
CALCIUM SERPL-MCNC: 9.7 MG/DL (ref 8.6–10.4)
CALCIUM SERPL-MCNC: 9.7 MG/DL (ref 8.6–10.4)
CHLORIDE SERPL-SCNC: 105 MMOL/L (ref 98–110)
CHLORIDE SERPL-SCNC: 106 MMOL/L (ref 98–110)
CHOLEST SERPL-MCNC: 185 MG/DL
CHOLEST/HDLC SERPL: 4.3 (CALC)
CO2 SERPL-SCNC: 27 MMOL/L (ref 20–32)
CO2 SERPL-SCNC: 28 MMOL/L (ref 20–32)
CREAT SERPL-MCNC: 0.64 MG/DL (ref 0.5–1.03)
CREAT SERPL-MCNC: 0.68 MG/DL (ref 0.5–1.03)
EGFRCR SERPLBLD CKD-EPI 2021: 103 ML/MIN/1.73M2
EGFRCR SERPLBLD CKD-EPI 2021: 105 ML/MIN/1.73M2
EOSINOPHIL # BLD AUTO: 160 CELLS/UL (ref 15–500)
EOSINOPHIL NFR BLD AUTO: 2.5 %
ERYTHROCYTE [DISTWIDTH] IN BLOOD BY AUTOMATED COUNT: 13.2 % (ref 11–15)
ERYTHROCYTE [DISTWIDTH] IN BLOOD BY AUTOMATED COUNT: 13.4 % (ref 11–15)
GLUCOSE SERPL-MCNC: 80 MG/DL (ref 65–99)
GLUCOSE SERPL-MCNC: 82 MG/DL (ref 65–99)
HCT VFR BLD AUTO: 38.7 % (ref 35–45)
HCT VFR BLD AUTO: 39.1 % (ref 35–45)
HDLC SERPL-MCNC: 43 MG/DL
HGB BLD-MCNC: 12.5 G/DL (ref 11.7–15.5)
HGB BLD-MCNC: 12.5 G/DL (ref 11.7–15.5)
LDLC SERPL CALC-MCNC: 107 MG/DL (CALC)
LYMPHOCYTES # BLD AUTO: 1869 CELLS/UL (ref 850–3900)
LYMPHOCYTES NFR BLD AUTO: 29.2 %
MCH RBC QN AUTO: 28.5 PG (ref 27–33)
MCH RBC QN AUTO: 28.9 PG (ref 27–33)
MCHC RBC AUTO-ENTMCNC: 32 G/DL (ref 32–36)
MCHC RBC AUTO-ENTMCNC: 32.3 G/DL (ref 32–36)
MCV RBC AUTO: 89.3 FL (ref 80–100)
MCV RBC AUTO: 89.4 FL (ref 80–100)
MONOCYTES # BLD AUTO: 454 CELLS/UL (ref 200–950)
MONOCYTES NFR BLD AUTO: 7.1 %
NEUTROPHILS # BLD AUTO: 3846 CELLS/UL (ref 1500–7800)
NEUTROPHILS NFR BLD AUTO: 60.1 %
NONHDLC SERPL-MCNC: 142 MG/DL (CALC)
PLATELET # BLD AUTO: 361 THOUSAND/UL (ref 140–400)
PLATELET # BLD AUTO: 370 THOUSAND/UL (ref 140–400)
PMV BLD REES-ECKER: 11.1 FL (ref 7.5–12.5)
PMV BLD REES-ECKER: 11.1 FL (ref 7.5–12.5)
POTASSIUM SERPL-SCNC: 4.8 MMOL/L (ref 3.5–5.3)
POTASSIUM SERPL-SCNC: 5.2 MMOL/L (ref 3.5–5.3)
PROT SERPL-MCNC: 7.2 G/DL (ref 6.1–8.1)
PROT SERPL-MCNC: 7.3 G/DL (ref 6.1–8.1)
PTH-INTACT SERPL-MCNC: 17 PG/ML (ref 16–77)
RBC # BLD AUTO: 4.33 MILLION/UL (ref 3.8–5.1)
RBC # BLD AUTO: 4.38 MILLION/UL (ref 3.8–5.1)
SODIUM SERPL-SCNC: 141 MMOL/L (ref 135–146)
SODIUM SERPL-SCNC: 141 MMOL/L (ref 135–146)
TRIGL SERPL-MCNC: 229 MG/DL
TSH SERPL-ACNC: 2.1 MIU/L
WBC # BLD AUTO: 6.4 THOUSAND/UL (ref 3.8–10.8)
WBC # BLD AUTO: 6.5 THOUSAND/UL (ref 3.8–10.8)

## 2025-04-03 ENCOUNTER — TREATMENT (OUTPATIENT)
Dept: PHYSICAL THERAPY | Facility: CLINIC | Age: 55
End: 2025-04-03
Payer: COMMERCIAL

## 2025-04-03 DIAGNOSIS — Z96.652 S/P TOTAL KNEE ARTHROPLASTY, LEFT: ICD-10-CM

## 2025-04-03 PROCEDURE — 97110 THERAPEUTIC EXERCISES: CPT | Mod: GP | Performed by: PHYSICAL THERAPIST

## 2025-04-03 NOTE — PROGRESS NOTES
"  Physical Therapy Treatment    Patient Name: Alisa Bland  MRN: [id number]  Today's Date: 4/3/2025  Visit: 6/25    Time Calculation  Start Time: 1135  Stop Time: 1230  Time Calculation (min): 55 min  Diagnosis:   1. S/P total knee arthroplasty, left  Follow Up In Physical Therapy        PRECAUTIONS:  Left TKA 2/12/25  Fall Risk: Low    SUBJECTIVE:  Patient reports soreness after last session but generally knee is feeling less stiff.    Pain: 1/10 left knee  Compliant with HEP? Yes    OBJECTIVE:  Left knee PROM 0-126 degrees  Slight limp with gait    TREATMENT:  - Therex:   Upright bike x 8' L2  Tilt board calf stretch 5x30\"  HS stretch 3x30\"  Knee flexion stretch 3x30\"  SL calf raise 3x10  Step ups 3x15 8\"  Forward step down 3x10 4\"  TG squat 3x15 L7+3  HS curl 3x12 30#  Total hip abd 2x10 ea L3  Airex SLS 10x10\"  LAQ 3x15 4#  SAQ 3x10 4#  - Manual Therapy:  Left patellar mobs, tibial glides  - Modalities:    CP x 10' left knee    ASSESSMENT:  Progressing well, limp most likely due to not trusting the LLE but should resolve with increased strengthening, excellent ROM.    EDUCATION:  Continue with HEP    PLAN:   HEP daily, PT 1-2 times/week.       "

## 2025-04-08 ENCOUNTER — TREATMENT (OUTPATIENT)
Dept: PHYSICAL THERAPY | Facility: CLINIC | Age: 55
End: 2025-04-08
Payer: COMMERCIAL

## 2025-04-08 DIAGNOSIS — Z96.652 S/P TOTAL KNEE ARTHROPLASTY, LEFT: ICD-10-CM

## 2025-04-08 PROCEDURE — 97110 THERAPEUTIC EXERCISES: CPT | Mod: GP | Performed by: PHYSICAL THERAPIST

## 2025-04-08 NOTE — PROGRESS NOTES
"  Physical Therapy Treatment    Patient Name: Alisa Bland  MRN: [id number]  Today's Date: 4/8/2025  Visit: 7/25    Time Calculation  Start Time: 0920  Stop Time: 1020  Time Calculation (min): 60 min  Diagnosis:   1. S/P total knee arthroplasty, left  Follow Up In Physical Therapy        PRECAUTIONS:  Left TKA 2/12/25  Fall Risk: Low    SUBJECTIVE:  Patient reports less stiffness, gets knee ache with prolonged sitting, no longer stiff when getting up in the morning.    Pain: 1/10 left knee  Compliant with HEP? Yes    OBJECTIVE:  Left knee AROM 0-120 degrees  Slight limp with gait    TREATMENT:  - Therex:   Upright bike x 8' L3  Tilt board calf stretch 5x30\"  HS stretch 3x30\"  Knee flexion stretch 3x30\"  SL calf raise 3x10  Step ups 3x15 8\"  Forward step down 3x15 4\"  TG squat 3x15 L7+3.5  HS curl 3x15 30#  Total hip abd 2x15 ea L3  Airex SLS 10x10\"  LAQ 3x10 5#  SAQ 3x10 5#  - Manual Therapy:  Left patellar mobs, tibial glides  - Modalities:    CP x 10' left knee    ASSESSMENT:  Patient continues to progress well with improving function and decreased c/o tightness, will benefit from several more months of progressive strength exercises.    EDUCATION:  Continue with HEP    PLAN:   HEP daily, PT 1-2 times/week.       "

## 2025-04-10 ENCOUNTER — TREATMENT (OUTPATIENT)
Dept: PHYSICAL THERAPY | Facility: CLINIC | Age: 55
End: 2025-04-10
Payer: COMMERCIAL

## 2025-04-10 DIAGNOSIS — Z96.652 S/P TOTAL KNEE ARTHROPLASTY, LEFT: ICD-10-CM

## 2025-04-10 PROCEDURE — 97110 THERAPEUTIC EXERCISES: CPT | Mod: GP | Performed by: PHYSICAL THERAPIST

## 2025-04-10 NOTE — PROGRESS NOTES
"  Physical Therapy Treatment    Patient Name: Alisa Bland  MRN: [id number]  Today's Date: 4/10/2025  Visit: 7/25    Time Calculation  Start Time: 0915  Stop Time: 1010  Time Calculation (min): 55 min  Diagnosis:   1. S/P total knee arthroplasty, left  Follow Up In Physical Therapy        PRECAUTIONS:  Left TKA 2/12/25  Fall Risk: Low    SUBJECTIVE:  Patient reports she's been focusing on performing stairs step over step, does notice slight increase in lateral knee pain today.    Pain: 3/10 lateral left knee  Compliant with HEP? Yes    OBJECTIVE:  Left knee AROM 0-120 degrees  Slight limp with gait    TREATMENT:  - Therex:   Upright bike x 8' L3  Tilt board calf stretch 5x30\"  HS stretch 3x30\"  Knee flexion stretch 3x30\"  SL calf raise 3x10  Step ups 3x15 8\"  Forward step down 3x15 4\"  TG squat 3x15 L7+3.5  HS curl 3x10 40#  Total hip abd 2x15 ea L3  LAQ 3x15 5#  SAQ 3x15 5#  - Manual Therapy:  Left patellar mobs, tibial glides  - Modalities:    CP x 10' left knee    ASSESSMENT:  Patient with signs of ITB irritation, not unexpected, will control with ice and oral meds, good tolerance to exercises with reduced symptoms after exercise.    EDUCATION:  Continue with HEP    PLAN:   HEP daily, PT 1-2 times/week.       "

## 2025-04-11 ENCOUNTER — OFFICE VISIT (OUTPATIENT)
Dept: ORTHOPEDIC SURGERY | Facility: CLINIC | Age: 55
End: 2025-04-11
Payer: COMMERCIAL

## 2025-04-11 ENCOUNTER — HOSPITAL ENCOUNTER (OUTPATIENT)
Dept: RADIOLOGY | Facility: CLINIC | Age: 55
Discharge: HOME | End: 2025-04-11
Payer: COMMERCIAL

## 2025-04-11 DIAGNOSIS — Z47.1 AFTERCARE FOLLOWING LEFT KNEE JOINT REPLACEMENT SURGERY: ICD-10-CM

## 2025-04-11 DIAGNOSIS — Z96.652 AFTERCARE FOLLOWING LEFT KNEE JOINT REPLACEMENT SURGERY: ICD-10-CM

## 2025-04-11 PROCEDURE — 99211 OFF/OP EST MAY X REQ PHY/QHP: CPT | Performed by: PHYSICIAN ASSISTANT

## 2025-04-11 PROCEDURE — 73562 X-RAY EXAM OF KNEE 3: CPT | Mod: LT

## 2025-04-11 PROCEDURE — 1036F TOBACCO NON-USER: CPT | Performed by: PHYSICIAN ASSISTANT

## 2025-04-11 ASSESSMENT — PAIN SCALES - GENERAL: PAINLEVEL_OUTOF10: 2

## 2025-04-11 ASSESSMENT — PAIN - FUNCTIONAL ASSESSMENT: PAIN_FUNCTIONAL_ASSESSMENT: 0-10

## 2025-04-11 ASSESSMENT — PAIN DESCRIPTION - DESCRIPTORS: DESCRIPTORS: ACHING

## 2025-04-11 NOTE — PROGRESS NOTES
Fiorella Rodriguez, LORETTA, PAVicC, ATC  Adult Reconstruction and Joint Replacement Surgery  Phone: [phone]     Fax:[phone]            Chief Complaint   Patient presents with    Left Knee - Post-op       HPI:  Alisa Bland is a pleasant 54 y.o. year-old female here for follow-up of their side: left total knee arthroplasty by Dr. Marques.  The patient is approximately 8 week(s) postop.The patient has no mechanical symptoms.  The patient has mild swelling and pain.   The patients wound has healed uneventfully.  The patient has been doing HEP and/or outpatient PT.  No complications postoperatively.      Review of Systems  Past Medical History:   Diagnosis Date    Anxiety     Arthritis     Depression     Hyperparathyroidism (Multi)     Personal history of other specified conditions 08/17/2018    History of fatigue    Personal history of other specified conditions 01/29/2016    History of weight gain    Thyroid nodule      Patient Active Problem List   Diagnosis    Anxiety    Depression    Elevated parathyroid hormone    Euthyroid goiter    Hypercalcemia    Hyperlipidemia    Multinodular goiter    Overweight (BMI 25.0-29.9)    Palpitations    Primary hyperparathyroidism (Multi)    Benign neoplasm of thyroid gland    Acute UTI    Fatigue    Hoarseness of voice    Menstrual disorder    Primary osteoarthritis of left knee    Right knee pain    Status post parathyroidectomy (CMS/[facility])    History of partial thyroidectomy    Tear of meniscus of knee    Symptoms involving urinary system    Weight gain    FH: CAD (coronary artery disease)    CMC arthritis    Primary osteoarthritis of both knees    Unilateral primary osteoarthritis, left knee    Vitamin D deficiency    S/P total knee arthroplasty, left     Medication Documentation Review Audit       Reviewed by Dominique Casanova on 04/11/25 at 0852      Medication Order Taking? Sig Documenting Provider Last Dose Status   multivitamin tablet [id number] No Take 1  tablet by mouth once daily. Joshua Fulton MD Past Month Active   pantoprazole (ProtoNix) 40 mg EC tablet [id number]  Take 1 tablet (40 mg) by mouth once daily. Do not crush, chew, or split. Latasha Guzman MD   25 2359   polyethylene glycol (Glycolax, Miralax) 17 gram/dose powder [id number]  Mix 1 cap (17g) into 8 ounces of fluid and once drink daily. Latasha Guzman MD  Active   semaglutide [id number] No Per Buderer protocol inject 0.3mg subcutaneously once a week for 4 weeks, then increase to 0.6mg once a week. Kris Tapia MD Past Month Active   sertraline (Zoloft) 50 mg tablet [id number] No Take 1 tablet (50 mg) by mouth once daily. Karlene Sandoval,  2025 Active                  No Known Allergies  Social History     Socioeconomic History    Marital status:      Spouse name: Not on file    Number of children: Not on file    Years of education: Not on file    Highest education level: Not on file   Occupational History    Not on file   Tobacco Use    Smoking status: Never    Smokeless tobacco: Never   Vaping Use    Vaping status: Not on file   Substance and Sexual Activity    Alcohol use: Yes     Alcohol/week: 2.0 standard drinks of alcohol     Types: 2 Shots of liquor per week     Comment: OCC    Drug use: Never    Sexual activity: Yes     Partners: Male     Birth control/protection: Post-menopausal   Other Topics Concern    Not on file   Social History Narrative    Not on file     Social Drivers of Health     Financial Resource Strain: Low Risk  (2025)    Overall Financial Resource Strain (CARDIA)     Difficulty of Paying Living Expenses: Not hard at all   Food Insecurity: Not on file   Transportation Needs: No Transportation Needs (2025)    [place]ASIS : Transportation     Lack of Transportation (Medical): No     Lack of Transportation (Non-Medical): No     Patient Unable or Declines to Respond: No   Physical Activity: Not on file   Stress:  Not on file   Social Connections: Feeling Socially Integrated (2/26/2025)    OASIS : Social Isolation     Frequency of experiencing loneliness or isolation: Never   Intimate Partner Violence: Not on file   Housing Stability: Low Risk  (2/12/2025)    Housing Stability Vital Sign     Unable to Pay for Housing in the Last Year: No     Number of Times Moved in the Last Year: 0     Homeless in the Last Year: No     Past Surgical History:   Procedure Laterality Date    BREAST BIOPSY Right     Hx Right benign core biopsy    BREAST SURGERY  02/24/2015    Breast Surgery    PARATHYROID GLAND SURGERY  05/22    THYROIDECTOMY, PARTIAL         Physical Exam  side: left Knee  There were no vitals filed for this visit.  AxO x 3 in NAD.   Assistive Device: no device. Coordination and balance intact.  Normal bilateral upper and lower extremities.  No erythema, ecchymosis, temperature changes. No popliteal lymphadenopathy,  No overlying lesion  Mood: euthymic  Respirations non-labored  The incision is midline healing well with no signs of surrounding infection, dehiscence or drainage.   Neurovascular exam is at baseline.  No instability varus or valgus stressing the knee at 0, 30 or 60 degrees.  No instability in the AP plane at 90 degrees.  Range of motion: 0 degrees extension, 120 degrees flexion  5/5 hip flexion/knee extension/DF/PF/EHL  SILT in eitan/saph/ per/tib distribution   Extremities warm and well perfused.  No lower extremity calf tenderness, warmth or swelling. Lower extremity well perfused  2+ Femoral/DP/PT pulses bilaterally    Imaging:  No images are attached to the encounter.    I personally reviewed multiple views of the knee today in clinic. Status post side: left Total Knee aArthroplasty. The implant is well fixed, well aligned.  No evidence of margaret-implant fracture, lucency or dislocation.    Impression/Plan:  Alisa Bland is doing well post-operatively and happy with the results of the operation.     S/P  side: left Total Knee Arthroplasty  I talked with patient at length about activity precautions and progression of activities. The patient understands their permanent precautions. At this time, you may gradually increase your activities and get back to a normal, low-impact lifestyle. Please avoid running, jumping, and heavy lifting.      3. Continue HEP or outpatient PT, per protocol.    4. Continue Post-operative instructions.    5. Discussed the importance of dental prophylactic dental antibiotics lifelong. Patient may request medication refill through MyChart,       Pharmacy or surgeons office.    All questions answered.    Follow-up 1 year with x-rays at next visit.    LORETTA Weiner, PA-C, ATC  Orthopedic Physician Assisant  Adult Reconstruction and Total Joint Replacement  General Orthopedics  Department of Orthopaedic Surgery  [place] [id number]  [facility] [employer] messaging preferred

## 2025-04-15 ENCOUNTER — TREATMENT (OUTPATIENT)
Dept: PHYSICAL THERAPY | Facility: CLINIC | Age: 55
End: 2025-04-15
Payer: COMMERCIAL

## 2025-04-15 DIAGNOSIS — Z96.652 S/P TOTAL KNEE ARTHROPLASTY, LEFT: ICD-10-CM

## 2025-04-15 PROCEDURE — 97110 THERAPEUTIC EXERCISES: CPT | Mod: GP | Performed by: PHYSICAL THERAPIST

## 2025-04-15 NOTE — PROGRESS NOTES
"  Physical Therapy Treatment    Patient Name: Alisa Bland  MRN: [id number]  Today's Date: 4/15/2025  Visit: 8/25    Time Calculation  Start Time: 0915  Stop Time: 1015  Time Calculation (min): 60 min  Diagnosis:   1. S/P total knee arthroplasty, left  Follow Up In Physical Therapy        PRECAUTIONS:  Left TKA 2/12/25  Fall Risk: Low    SUBJECTIVE:  Patient reports she's been focusing on performing stairs step over step, does notice slight increase in lateral knee pain today.    Pain: 3/10 lateral left knee  Compliant with HEP? Yes    OBJECTIVE:  Left knee AROM 0-120 degrees  Slight limp with gait    TREATMENT:  - Therex:   Upright bike x 8' L3  Tilt board calf stretch 5x30\"  HS stretch 3x30\"  Knee flexion stretch 3x30\"  SL calf raise 3x10  Step ups 3x15 8\" with 10#kb  Forward step down 3x15 4\"  SL TG squat 3x10 L7  HS curl 3x10 40#  Total hip abd 2x15 ea L3  LAQ 3x15 5.5#  SAQ 3x15 5.5#  - Manual Therapy:  Left patellar mobs, tibial glides  - Modalities:    CP x 10' left knee    ASSESSMENT:  Less ITB irritation today, will be getting cortisone shot in right knee in a week as right knee seems to be more sore than left, started more SL strength activities today with difficulty.    EDUCATION:  Continue with HEP    PLAN:   HEP daily, PT 1-2 times/week.       "

## 2025-04-17 ENCOUNTER — TREATMENT (OUTPATIENT)
Dept: PHYSICAL THERAPY | Facility: CLINIC | Age: 55
End: 2025-04-17
Payer: COMMERCIAL

## 2025-04-17 DIAGNOSIS — Z96.652 S/P TOTAL KNEE ARTHROPLASTY, LEFT: ICD-10-CM

## 2025-04-17 PROCEDURE — 97110 THERAPEUTIC EXERCISES: CPT | Mod: GP | Performed by: PHYSICAL THERAPIST

## 2025-04-17 NOTE — PROGRESS NOTES
"  Physical Therapy Treatment    Patient Name: Alisa Bland  MRN: [id number]  Today's Date: 4/17/2025  Visit: 9/25    Time Calculation  Start Time: 0915  Stop Time: 1015  Time Calculation (min): 60 min  Diagnosis:   1. S/P total knee arthroplasty, left  Follow Up In Physical Therapy        PRECAUTIONS:  Left TKA 2/12/25  Fall Risk: Low    SUBJECTIVE:  Patient reports more right knee issues vs left today, right knee injection scheduled for next week, going up stairs properly now without thinking.    Pain: 1/10 lateral left knee  Compliant with HEP? Yes    OBJECTIVE:  Left knee AROM 0-123 degrees  Slight limp with gait  +TTP distal left ITB    TREATMENT:  - Therex:   Upright bike x 8' L3  Tilt board calf stretch 5x30\"  HS stretch 3x30\"  Knee flexion stretch 3x30\"  SL calf raise 3x10  Step ups 3x15 8\" with 10#kb  Forward step down 3x15 4\"  SL TG squat 3x10 L7  HS curl 3x10 40#  Total hip abd 2x15 ea L3  LAQ 3x15 5.5#  SAQ 3x15 5.5#  - Manual Therapy:  Left patellar mobs, tibial glides  - Modalities:    CP x 10' left knee    ASSESSMENT:  Progressing well, will add resistance to hip strengthening at home, unclear if limping is due to right knee, left hip weakness or just habit but patient aware, will look to increase LLE strengthening as tolerated.    EDUCATION:  Continue with HEP    PLAN:   HEP daily, PT 1-2 times/week.       "

## 2025-04-22 ENCOUNTER — TREATMENT (OUTPATIENT)
Dept: PHYSICAL THERAPY | Facility: CLINIC | Age: 55
End: 2025-04-22
Payer: COMMERCIAL

## 2025-04-22 DIAGNOSIS — Z96.652 S/P TOTAL KNEE ARTHROPLASTY, LEFT: ICD-10-CM

## 2025-04-22 PROCEDURE — 97110 THERAPEUTIC EXERCISES: CPT | Mod: GP | Performed by: PHYSICAL THERAPIST

## 2025-04-24 ENCOUNTER — TREATMENT (OUTPATIENT)
Dept: PHYSICAL THERAPY | Facility: CLINIC | Age: 55
End: 2025-04-24
Payer: COMMERCIAL

## 2025-04-24 DIAGNOSIS — Z96.652 S/P TOTAL KNEE ARTHROPLASTY, LEFT: ICD-10-CM

## 2025-04-24 PROCEDURE — 97110 THERAPEUTIC EXERCISES: CPT | Mod: GP | Performed by: PHYSICAL THERAPIST

## 2025-04-24 NOTE — PROGRESS NOTES
"  Physical Therapy Treatment    Patient Name: Alisa Bland  MRN: [id number]  Today's Date: 4/24/2025  Visit: 11/25    Time Calculation  Start Time: 0910  Stop Time: 1010  Time Calculation (min): 60 min  Diagnosis:   1. S/P total knee arthroplasty, left  Follow Up In Physical Therapy        PRECAUTIONS:  Left TKA 2/12/25  Fall Risk: Low    SUBJECTIVE:  Patient reports knee feeling better today, patient getting right knee injected tomorrow.    Pain: 1/10 left knee  Compliant with HEP? Yes    OBJECTIVE:  Left knee AROM 0-123 degrees  Slight limp with gait      TREATMENT:  - Therex:   Upright bike x 8' L3  Tilt board calf stretch 5x30\"  HS stretch 3x30\"  Knee flexion stretch 3x30\"  SL calf raise 3x10  Step ups 3x15 8\" with 15#kb  Forward step down 3x15 4\"  SL TG squat 3x10 L7+1/2  HS curl 3x10 40#  Total hip abd 2x15 ea L3  LAQ 3x15 6.5#  SAQ 3x15 6.5#  - Manual Therapy:  Left patellar mobs, tibial glides  - Modalities:    CP x 10' left knee    ASSESSMENT:  Patient with continued altered gait, unclear if right knee is an issue but injection should help, will benefit from continued strengthening.    EDUCATION:  Continue with HEP    PLAN:   HEP daily, PT 1-2 times/week.       "

## 2025-04-25 ENCOUNTER — OFFICE VISIT (OUTPATIENT)
Dept: ORTHOPEDIC SURGERY | Facility: CLINIC | Age: 55
End: 2025-04-25
Payer: COMMERCIAL

## 2025-04-25 DIAGNOSIS — M17.11 LOCALIZED OSTEOARTHRITIS OF RIGHT KNEE: Primary | ICD-10-CM

## 2025-04-25 PROCEDURE — 2500000004 HC RX 250 GENERAL PHARMACY W/ HCPCS (ALT 636 FOR OP/ED): Performed by: PHYSICIAN ASSISTANT

## 2025-04-25 PROCEDURE — 99213 OFFICE O/P EST LOW 20 MIN: CPT | Mod: 25 | Performed by: PHYSICIAN ASSISTANT

## 2025-04-25 PROCEDURE — 20610 DRAIN/INJ JOINT/BURSA W/O US: CPT | Mod: RT | Performed by: PHYSICIAN ASSISTANT

## 2025-04-25 RX ORDER — TRIAMCINOLONE ACETONIDE 40 MG/ML
40 INJECTION, SUSPENSION INTRA-ARTICULAR; INTRAMUSCULAR
Status: COMPLETED | OUTPATIENT
Start: 2025-04-25 | End: 2025-04-25

## 2025-04-25 RX ORDER — LIDOCAINE HYDROCHLORIDE 10 MG/ML
4 INJECTION, SOLUTION INFILTRATION; PERINEURAL
Status: COMPLETED | OUTPATIENT
Start: 2025-04-25 | End: 2025-04-25

## 2025-04-25 RX ADMIN — TRIAMCINOLONE ACETONIDE 40 MG: 400 INJECTION, SUSPENSION INTRA-ARTICULAR; INTRAMUSCULAR at 09:09

## 2025-04-25 RX ADMIN — LIDOCAINE HYDROCHLORIDE 4 ML: 10 INJECTION, SOLUTION INFILTRATION; PERINEURAL at 09:09

## 2025-04-25 NOTE — PROGRESS NOTES
LORETTA Weiner, PAVicC, ATC  Adult Reconstruction and Joint Replacement Surgery  Phone: [phone]     Fax:[phone]            Chief Complaint   Patient presents with    Right Knee - Injections       HPI  Alisa Bland is a pleasant 54 y.o. year-old female here for follow-up of known underlying side: right knee osteoarthritis. The patient presents for repeat evaluation.  The patient notes persistent pain as well as some occasional mechanical symptoms.  Pain level: 5. The patient is getting about 6 monthsof relief out of her injections.   The Pt has elected to undergo injection/s today.    Medical History[1]  Problem List[2]    Medication Documentation Review Audit       Reviewed by Dominique Casanova on 25 at 0852      Medication Order Taking? Sig Documenting Provider Last Dose Status   multivitamin tablet [id number] No Take 1 tablet by mouth once daily. Historical Provider, MD Past Month Active   pantoprazole (ProtoNix) 40 mg EC tablet [id number]  Take 1 tablet (40 mg) by mouth once daily. Do not crush, chew, or split. Latasha Guzman MD   25 2359   polyethylene glycol (Glycolax, Miralax) 17 gram/dose powder [id number]  Mix 1 cap (17g) into 8 ounces of fluid and once drink daily. Latasha Guzman MD  Active   semaglutide [id number] No Per Buderer protocol inject 0.3mg subcutaneously once a week for 4 weeks, then increase to 0.6mg once a week. Kris Tapia MD Past Month Active   sertraline (Zoloft) 50 mg tablet [id number] No Take 1 tablet (50 mg) by mouth once daily. Karlene Sandoval,  2025 Active                    RX Allergies[3]    Social History     Socioeconomic History    Marital status:      Spouse name: Not on file    Number of children: Not on file    Years of education: Not on file    Highest education level: Not on file   Occupational History    Not on file   Tobacco Use    Smoking status: Never    Smokeless tobacco: Never   Vaping  Use    Vaping status: Not on file   Substance and Sexual Activity    Alcohol use: Yes     Alcohol/week: 2.0 standard drinks of alcohol     Types: 2 Shots of liquor per week     Comment: OCC    Drug use: Never    Sexual activity: Yes     Partners: Male     Birth control/protection: Post-menopausal   Other Topics Concern    Not on file   Social History Narrative    Not on file     Social Drivers of Health     Financial Resource Strain: Low Risk  (2/12/2025)    Overall Financial Resource Strain (CARDIA)     Difficulty of Paying Living Expenses: Not hard at all   Food Insecurity: Not on file   Transportation Needs: No Transportation Needs (2/26/2025)    OASIS : Transportation     Lack of Transportation (Medical): No     Lack of Transportation (Non-Medical): No     Patient Unable or Declines to Respond: No   Physical Activity: Not on file   Stress: Not on file   Social Connections: Feeling Socially Integrated (2/26/2025)    OASIS : Social Isolation     Frequency of experiencing loneliness or isolation: Never   Intimate Partner Violence: Not on file   Housing Stability: Low Risk  (2/12/2025)    Housing Stability Vital Sign     Unable to Pay for Housing in the Last Year: No     Number of Times Moved in the Last Year: 0     Homeless in the Last Year: No       Surgical History[4]    Review of Systems    Physical Exam:  side: right Knee:  General: Well-appearing female in no acute distress.  Awake, alert and oriented.  Pleasant and cooperative.  Respiratory: Non-labored breathing  Mood: Euthymic   Gait: Antalgic  Assistive Device: no device  Skin: warm, dry and intact without lesions  Tenderness to palpation over anterior and medial, Joint line.  Effusion: mild  ROM Flexion/Extension: Unchanged  No instability varus or valgus stressing the knee at 0, 30 or 60 degrees.  No instability in the AP plane at 90 degrees.  5/5 hip flexion/knee extension/DF/PF/EHL  SILT in a eitan/saph/per/tib distribution  Neuro L5-S1  sensation intact bilaterally, No clonus. 2+ symmetric patella and achilles reflexes bilateral.  2+ Femoral/DP/PT pulses bilaterally  Compartments supple and non-tender.  Extremities warm and well perfused.    There were no vitals filed for this visit.  There is no height or weight on file to calculate BMI.    Impression/Plan  Alisa Bland is getting adequate relief from injection therapy.     I discussed non-operative treatments for the patients' arthritis in detail and briefly discussed indications for surgery vs conservative treatment. The patient has elected to undergo knee side: right injections today. They can repeat the injections every 3 to 4 months.  Hopefully gets the relief they are looking for.    Primary Osteoarthritis side: right Knee    Procedure  L Inj/Asp: R knee on 4/25/2025 9:09 AM  Indications: pain and joint swelling  Details: 22 G needle, anterolateral approach  Medications: 40 mg triamcinolone acetonide 40 mg/mL; 4 mL lidocaine 10 mg/mL (1 %)  Outcome: tolerated well, no immediate complications    Discussion:  I discussed the conservative treatment options for knee osteoarthritis including but not limited to physical therapy, oral NSAIDS, activity and lifestyle modification, and corticosteroid injections. Pt has elected to undergo a cortisone injection today. I have explained the risk and benefits of an injection including the possibility of joint infection, bleeding, damage to cartilage, allergic reaction. Patient verbalized understanding and gave verbal consent wishes to proceed with a intra-articular cortisone injection for their knee.    Procedure:  After discussing the risk and benefits of the procedure, we proceeded with an intra-articular right knee injection.    With the patient's informed verbal consent, the right knee was prepped in standard sterile fashion with Chlorhexidine. The skin was then anesthetized with ethyl chloride spray and cleaned again with Chlorhexidine. The knee was  then apirated/injected with a prefilled 20-gauge syringe of 40 mg Kenalog + 4 ml Lidocaine using the lateral approach without complications.  The patient tolerated this well and felt immediate initial relief of symptoms. A bandaid was applied and the patient ambulated out of the clinic on ther own accord without difficulty. Patient was instructed to avoid physical activity for 24-48 hours to prevent the knees from swelling and may ice the knees as tolerated. Patient should contact the office if any signs of of infection appear: redness, fever, chills, drainage, swelling or warmth to the knees.  Pt understands that the injections can be repeated no sooner than 3 months.    Procedure, treatment alternatives, risks and benefits explained, specific risks discussed. Consent was given by the patient. Immediately prior to procedure a time out was called to verify the correct patient, procedure, equipment, support staff and site/side marked as required. Patient was prepped and draped in the usual sterile fashion.           All questions were answered.    Follow-up 3-4 months/PRN    Fiorella Rodriguez MPAS, PA-C, ATC  Orthopedic Physician [facility]  Adult Reconstruction and Total Joint Replacement  General Orthopedics  Department of Orthopaedic Surgery  [place] [address]  SCYFIX messaging preferred         [1]   Past Medical History:  Diagnosis Date    Anxiety     Arthritis     Depression     Hyperparathyroidism (Multi)     Personal history of other specified conditions 08/17/2018    History of fatigue    Personal history of other specified conditions 01/29/2016    History of weight gain    Thyroid nodule    [2]   Patient Active Problem List  Diagnosis    Anxiety    Depression    Elevated parathyroid hormone    Euthyroid goiter    Hypercalcemia    Hyperlipidemia    Multinodular goiter    Overweight (BMI 25.0-29.9)    Palpitations    Primary  hyperparathyroidism (Multi)    Benign neoplasm of thyroid gland    Acute UTI    Fatigue    Hoarseness of voice    Menstrual disorder    Primary osteoarthritis of left knee    Right knee pain    Status post parathyroidectomy (CMS/HCC)    History of partial thyroidectomy    Tear of meniscus of knee    Symptoms involving urinary system    Weight gain    FH: CAD (coronary artery disease)    CMC arthritis    Primary osteoarthritis of both knees    Unilateral primary osteoarthritis, left knee    Vitamin D deficiency    S/P total knee arthroplasty, left   [3] No Known Allergies  [4]   Past Surgical History:  Procedure Laterality Date    BREAST BIOPSY Right     Hx Right benign core biopsy    BREAST SURGERY  02/24/2015    Breast Surgery    PARATHYROID GLAND SURGERY  05/22    THYROIDECTOMY, PARTIAL

## 2025-04-29 ENCOUNTER — TREATMENT (OUTPATIENT)
Dept: PHYSICAL THERAPY | Facility: CLINIC | Age: 55
End: 2025-04-29
Payer: COMMERCIAL

## 2025-04-29 DIAGNOSIS — Z96.652 S/P TOTAL KNEE ARTHROPLASTY, LEFT: ICD-10-CM

## 2025-04-29 PROCEDURE — 97110 THERAPEUTIC EXERCISES: CPT | Mod: GP | Performed by: PHYSICAL THERAPIST

## 2025-04-29 NOTE — PROGRESS NOTES
"  Physical Therapy Treatment    Patient Name: Alisa Bland  MRN: [id number]  Today's Date: 4/29/2025  Visit: 12/25    Time Calculation  Start Time: 0920  Stop Time: 1015  Time Calculation (min): 55 min  Diagnosis:   1. S/P total knee arthroplasty, left  Follow Up In Physical Therapy        PRECAUTIONS:  Left TKA 2/12/25  Fall Risk: Low    SUBJECTIVE:  Patient reports right knee feeling better since injection last week, left knee did well with long car ride to [place], some soreness on the way out but none on the way back, exercises going well.    Pain: 1/10 left knee  Compliant with HEP? Yes    OBJECTIVE:  Left knee AROM 0-123 degrees  Slight limp with gait    TREATMENT:  - Therex:   Upright bike x 8' L3  Tilt board calf stretch 5x30\"  HS stretch 3x30\"  Knee flexion stretch 3x30\"  SL calf raise 3x10  Step ups 3x15 8\" with 15#kb  Forward step down 3x10 6\"  SL TG squat 3x10 L7+1  HS curl 3x12 40#  Total hip abd 2x15 ea L3  Leg ext 3x10 10#  - Manual Therapy:  Left patellar mobs, tibial glides  - Modalities:    CP x 10' left knee    ASSESSMENT:  Patient continues to do well, improved gait with recent injection, will look to start back into gym at [employer] as we will be phasing out of formal PT in next several weeks.    EDUCATION:  Continue with HEP    PLAN:   HEP daily, PT 1 time/week for 2-3 weeks.       "

## 2025-05-01 ENCOUNTER — TREATMENT (OUTPATIENT)
Dept: PHYSICAL THERAPY | Facility: CLINIC | Age: 55
End: 2025-05-01
Payer: COMMERCIAL

## 2025-05-01 DIAGNOSIS — Z96.652 S/P TOTAL KNEE ARTHROPLASTY, LEFT: ICD-10-CM

## 2025-05-01 PROCEDURE — 97110 THERAPEUTIC EXERCISES: CPT | Mod: GP | Performed by: PHYSICAL THERAPIST

## 2025-05-01 NOTE — PROGRESS NOTES
"  Physical Therapy Treatment    Patient Name: Alisa Bland  MRN: [id number]  Today's Date: 5/1/2025  Visit: 13/25    Time Calculation  Start Time: 0915  Stop Time: 1010  Time Calculation (min): 55 min  Diagnosis:   1. S/P total knee arthroplasty, left  Follow Up In Physical Therapy        PRECAUTIONS:  Left TKA 2/12/25  Fall Risk: Low    SUBJECTIVE:  Patient reports both knees feeling good, exercises going well.    Pain: 1/10 left knee  Compliant with HEP? Yes    OBJECTIVE:  Left knee AROM 0-123 degrees  Slight limp with gait    TREATMENT:  - Therex:   Upright bike x 8' L3  Tilt board calf stretch 5x30\"  HS stretch 3x30\"  Knee flexion stretch 3x30\"  SL calf raise 3x10  Step ups 3x15 8\" with 15#kb  Forward step down 3x10 6\"  SL TG squat 3x10 L7+1  HS curl 3x12 40#  Total hip abd 2x15 ea L3  Leg ext 3x10 10#  SAQ 3x15 5#  - Manual Therapy:  Left patellar mobs, tibial glides  - Modalities:    CP x 10' left knee    ASSESSMENT:  Good tolerance to all strengthening, gait improving.    EDUCATION:  Continue with HEP    PLAN:   HEP daily, PT 1 time/week for 2-3 weeks.       "

## 2025-05-02 DIAGNOSIS — Z96.652 S/P TKR (TOTAL KNEE REPLACEMENT) USING CEMENT, LEFT: Primary | ICD-10-CM

## 2025-05-02 RX ORDER — AMOXICILLIN 500 MG/1
CAPSULE ORAL
Qty: 4 CAPSULE | Refills: 6 | Status: SHIPPED | OUTPATIENT
Start: 2025-05-02

## 2025-05-07 ENCOUNTER — APPOINTMENT (OUTPATIENT)
Dept: PHYSICAL THERAPY | Facility: CLINIC | Age: 55
End: 2025-05-07
Payer: COMMERCIAL

## 2025-05-07 DIAGNOSIS — Z96.652 S/P TOTAL KNEE ARTHROPLASTY, LEFT: ICD-10-CM

## 2025-05-13 ENCOUNTER — TELEPHONE (OUTPATIENT)
Dept: OCCUPATIONAL THERAPY | Facility: CLINIC | Age: 55
End: 2025-05-13

## 2025-05-15 ENCOUNTER — APPOINTMENT (OUTPATIENT)
Dept: PRIMARY CARE | Facility: CLINIC | Age: 55
End: 2025-05-15
Payer: COMMERCIAL

## 2025-05-15 ENCOUNTER — APPOINTMENT (OUTPATIENT)
Dept: PHYSICAL THERAPY | Facility: CLINIC | Age: 55
End: 2025-05-15
Payer: COMMERCIAL

## 2025-05-15 VITALS
WEIGHT: 175 LBS | HEIGHT: 67 IN | DIASTOLIC BLOOD PRESSURE: 63 MMHG | TEMPERATURE: 98 F | BODY MASS INDEX: 27.47 KG/M2 | HEART RATE: 71 BPM | OXYGEN SATURATION: 94 % | SYSTOLIC BLOOD PRESSURE: 98 MMHG

## 2025-05-15 DIAGNOSIS — F41.9 ANXIETY: ICD-10-CM

## 2025-05-15 DIAGNOSIS — K21.9 GASTROESOPHAGEAL REFLUX DISEASE WITHOUT ESOPHAGITIS: ICD-10-CM

## 2025-05-15 DIAGNOSIS — Z96.652 S/P TOTAL KNEE ARTHROPLASTY, LEFT: ICD-10-CM

## 2025-05-15 DIAGNOSIS — E78.2 MIXED HYPERLIPIDEMIA: ICD-10-CM

## 2025-05-15 DIAGNOSIS — Z00.00 PHYSICAL EXAM: Primary | ICD-10-CM

## 2025-05-15 DIAGNOSIS — Z12.31 BREAST CANCER SCREENING BY MAMMOGRAM: ICD-10-CM

## 2025-05-15 DIAGNOSIS — F32.5 MAJOR DEPRESSIVE DISORDER WITH SINGLE EPISODE, IN FULL REMISSION: ICD-10-CM

## 2025-05-15 PROCEDURE — 3008F BODY MASS INDEX DOCD: CPT | Performed by: FAMILY MEDICINE

## 2025-05-15 PROCEDURE — 99213 OFFICE O/P EST LOW 20 MIN: CPT | Performed by: FAMILY MEDICINE

## 2025-05-15 PROCEDURE — 99396 PREV VISIT EST AGE 40-64: CPT | Performed by: FAMILY MEDICINE

## 2025-05-15 PROCEDURE — 1036F TOBACCO NON-USER: CPT | Performed by: FAMILY MEDICINE

## 2025-05-15 RX ORDER — PANTOPRAZOLE SODIUM 40 MG/1
40 TABLET, DELAYED RELEASE ORAL DAILY
Qty: 90 TABLET | Refills: 1 | Status: SHIPPED | OUTPATIENT
Start: 2025-05-15

## 2025-05-15 RX ORDER — SERTRALINE HYDROCHLORIDE 50 MG/1
50 TABLET, FILM COATED ORAL DAILY
Qty: 90 TABLET | Refills: 1 | Status: SHIPPED | OUTPATIENT
Start: 2025-05-15

## 2025-05-15 ASSESSMENT — PATIENT HEALTH QUESTIONNAIRE - PHQ9
5. POOR APPETITE OR OVEREATING: SEVERAL DAYS
1. LITTLE INTEREST OR PLEASURE IN DOING THINGS: NOT AT ALL
4. FEELING TIRED OR HAVING LITTLE ENERGY: SEVERAL DAYS
8. MOVING OR SPEAKING SO SLOWLY THAT OTHER PEOPLE COULD HAVE NOTICED. OR THE OPPOSITE, BEING SO FIGETY OR RESTLESS THAT YOU HAVE BEEN MOVING AROUND A LOT MORE THAN USUAL: NOT AT ALL
10. IF YOU CHECKED OFF ANY PROBLEMS, HOW DIFFICULT HAVE THESE PROBLEMS MADE IT FOR YOU TO DO YOUR WORK, TAKE CARE OF THINGS AT HOME, OR GET ALONG WITH OTHER PEOPLE: NOT DIFFICULT AT ALL
3. TROUBLE FALLING OR STAYING ASLEEP OR SLEEPING TOO MUCH: SEVERAL DAYS
6. FEELING BAD ABOUT YOURSELF - OR THAT YOU ARE A FAILURE OR HAVE LET YOURSELF OR YOUR FAMILY DOWN: NOT AT ALL
SUM OF ALL RESPONSES TO PHQ9 QUESTIONS 1 AND 2: 1
SUM OF ALL RESPONSES TO PHQ QUESTIONS 1-9: 4
2. FEELING DOWN, DEPRESSED OR HOPELESS: SEVERAL DAYS
7. TROUBLE CONCENTRATING ON THINGS, SUCH AS READING THE NEWSPAPER OR WATCHING TELEVISION: NOT AT ALL
9. THOUGHTS THAT YOU WOULD BE BETTER OFF DEAD, OR OF HURTING YOURSELF: NOT AT ALL

## 2025-05-15 ASSESSMENT — PAIN SCALES - GENERAL: PAINLEVEL_OUTOF10: 0-NO PAIN

## 2025-05-15 ASSESSMENT — ANXIETY QUESTIONNAIRES
GAD7 TOTAL SCORE: 3
4. TROUBLE RELAXING: NOT AT ALL
3. WORRYING TOO MUCH ABOUT DIFFERENT THINGS: SEVERAL DAYS
IF YOU CHECKED OFF ANY PROBLEMS ON THIS QUESTIONNAIRE, HOW DIFFICULT HAVE THESE PROBLEMS MADE IT FOR YOU TO DO YOUR WORK, TAKE CARE OF THINGS AT HOME, OR GET ALONG WITH OTHER PEOPLE: NOT DIFFICULT AT ALL
2. NOT BEING ABLE TO STOP OR CONTROL WORRYING: SEVERAL DAYS
5. BEING SO RESTLESS THAT IT IS HARD TO SIT STILL: NOT AT ALL
6. BECOMING EASILY ANNOYED OR IRRITABLE: NOT AT ALL
1. FEELING NERVOUS, ANXIOUS, OR ON EDGE: SEVERAL DAYS
7. FEELING AFRAID AS IF SOMETHING AWFUL MIGHT HAPPEN: NOT AT ALL

## 2025-05-15 NOTE — PROGRESS NOTES
"Subjective   Patient ID: Charlotte Bland is a 54 y.o. female who presents for Annual Exam and Med Refill.    HPI   Patient is in the office today for a follow-up and cpe.  She would like to discuss her lab results from 04/01/2025.    She states that she had her knee replacement  April 2025, and she has not been eating a healthy diet. She admits that she is eating a lot of processed food.    She continues on sertraline 50 mg daily for treatment of depression/anxiety symptoms. She states that her depression/anxiety have been controlled on sertraline medication. Her mood has been good on this medication. She denies any side-effects to her sertraline medication.     She reports that she is using semaglutide injections every week since November 2024, but she does not remember the dose of it. She believes that it is not working on her because she notices that her weight is the same. She states lost about 6# since Nov 2024.  She sees Dr Tolbert for endocrine    Her blood pressure (98/63) was within normal range when checked in the clinic today. She denies any chest pain and/or SOB symptoms.    Had normal thyroid bx, following up again in 1 year    She has personal history of GERD which was treated with pantoprazole 40 mg, but she stopped it because she finished her NSAID's. She reports that she is having a lot of acid reflux and would like to know if she should restart it. She denies any side effects to the medication.     Her most recent gynecological exam was done in December 2023. Her most recent mammogram screening was done in February 2024. Her most recent colonoscopy screening was done in June 2024 (10-year recall). Her most recent DEXA bone density scan was done in October 2021.    Review of Systems    Objective   BP 98/63 (BP Location: Left arm, Patient Position: Sitting, BP Cuff Size: Adult)   Pulse 71   Temp 36.7 °C (98 °F) (Temporal)   Ht 1.689 m (5' 6.5\")   Wt 79.4 kg (175 lb)   SpO2 94%   BMI 27.82 " kg/m²     Physical Exam  HENT:      Head: Normocephalic.      Right Ear: Tympanic membrane normal.      Left Ear: Tympanic membrane normal.      Mouth/Throat:      Pharynx: Oropharynx is clear.   Neck:      Vascular: No carotid bruit.   Cardiovascular:      Rate and Rhythm: Normal rate and regular rhythm.      Pulses: Normal pulses.      Heart sounds: Normal heart sounds.   Pulmonary:      Effort: Pulmonary effort is normal.      Breath sounds: Normal breath sounds.   Abdominal:      General: Bowel sounds are normal.      Palpations: Abdomen is soft. There is no mass.      Tenderness: There is no abdominal tenderness.   Musculoskeletal:         General: Normal range of motion.      Cervical back: Normal range of motion.      Right lower leg: No edema.      Left lower leg: No edema.   Lymphadenopathy:      Cervical: No cervical adenopathy.   Skin:     General: Skin is warm and dry.   Neurological:      Mental Status: She is alert and oriented to person, place, and time.   Psychiatric:         Mood and Affect: Mood normal.         Thought Content: Thought content normal.         Judgment: Judgment normal.         Assessment/Plan   Diagnoses and all orders for this visit:  Physical exam  Anxiety  -     sertraline (Zoloft) 50 mg tablet; Take 1 tablet (50 mg) by mouth once daily.  Mixed hyperlipidemia  Major depressive disorder with single episode, in full remission (CMS-[facility])  S/P total knee arthroplasty, left  -     pantoprazole (ProtoNix) 40 mg EC tablet; Take 1 tablet (40 mg) by mouth once daily. Do not crush, chew, or split.  Breast cancer screening by mammogram  -     BI mammo bilateral screening tomosynthesis; Future  Gastroesophageal reflux disease without esophagitis    Discussed lab results from 04/01/2025 which showed: , HDL 43, Triglycerides 229, , glucose 80, creatinine 0.64, , TSH 2.10, vitamin D 53.   Recommended screening mammogram.  Discussed ways to improve her diet.   Recommended  restart pantoprazole 40 mg once daily. Se profile and usage discussed.  Discussed antireflux precautions in detail  Medications refilled today. Instructed take medications as prescribed.   Follow-up in 6 months, call us if needed sooner.     Scribe Attestation  By signing my name below, I, Paola Mcqueen   attest that this documentation has been prepared under the direction and in the presence of Karlene Sandoval DO.

## 2025-05-16 DIAGNOSIS — E66.3 OVERWEIGHT (BMI 25.0-29.9): ICD-10-CM

## 2025-05-21 ENCOUNTER — APPOINTMENT (OUTPATIENT)
Dept: PHYSICAL THERAPY | Facility: CLINIC | Age: 55
End: 2025-05-21
Payer: COMMERCIAL

## 2025-05-21 DIAGNOSIS — Z96.652 S/P TOTAL KNEE ARTHROPLASTY, LEFT: ICD-10-CM

## 2025-05-30 ENCOUNTER — APPOINTMENT (OUTPATIENT)
Dept: RADIOLOGY | Facility: CLINIC | Age: 55
End: 2025-05-30
Payer: COMMERCIAL

## 2025-05-30 DIAGNOSIS — Z12.31 BREAST CANCER SCREENING BY MAMMOGRAM: ICD-10-CM

## 2025-05-30 PROCEDURE — 77063 BREAST TOMOSYNTHESIS BI: CPT | Performed by: RADIOLOGY

## 2025-05-30 PROCEDURE — 77067 SCR MAMMO BI INCL CAD: CPT | Performed by: RADIOLOGY

## 2025-05-30 PROCEDURE — 77067 SCR MAMMO BI INCL CAD: CPT

## 2025-06-09 NOTE — PROGRESS NOTES
"Jurgen Bland \"Charlotte\" is a 54 y.o. female with a history of primary HPT, hypercalcemia, meniscus tear, knee arthritis (s/p cortisol shots), who presents for medical weight management.    Virtual:  Current Plan  1. Nutrition: Mediterranean Diet     2. Sleep: Stable      3. Stress: Stable     4. Exercise: Going to [employer] 2 times a week, doing yoga, doing PT      5. Appetite control: snacking at night   Obesity medication: Semaglutide 0.6mg weekly- MD will send 1.2mg today     6. Prior Goals: Met     New Goals: cut back on snacking at night     Weight trend:    Wt Readings from Last 10 Encounters:   05/15/25 79.4 kg (175 lb)   02/12/25 79 kg (174 lb 1.6 oz)   01/30/25 79.8 kg (176 lb)   01/22/25 79.8 kg (176 lb)   01/02/25 79.4 kg (175 lb)   11/19/24 82.1 kg (181 lb)   09/06/24 82.1 kg (181 lb)   06/26/24 81.6 kg (180 lb)   06/06/24 79.1 kg (174 lb 6.1 oz)   04/17/24 82.3 kg (181 lb 8 oz)       Current Medications[1]    ROS:  System: normal  Eyes : no visual changes  Neck : no tenderness, no new lumps/bumps  Respiratory : no SOB  Cardiovascular : no chest pain, no palpitations  Gastro-Intestinal : no abdominal concerns  Neurological : no numbness or tingling in the extremities  Musculoskeletal : no joint paint, no muscle pain  Skin : no unusual rashes  Psychiatric : no depression, no anxiety  See HPI for Endocrine ROS    Medical History[2]    Surgical History[3]    Social History     Socioeconomic History    Marital status:      Spouse name: Not on file    Number of children: Not on file    Years of education: Not on file    Highest education level: Not on file   Occupational History    Not on file   Tobacco Use    Smoking status: Never    Smokeless tobacco: Never   Vaping Use    Vaping status: Not on file   Substance and Sexual Activity    Alcohol use: Yes     Alcohol/week: 2.0 standard drinks of alcohol     Types: 2 Shots of liquor per week     Comment: OCC    Drug use: Never    Sexual " activity: Yes     Partners: Male     Birth control/protection: Post-menopausal   Other Topics Concern    Not on file   Social History Narrative    Not on file     Social Drivers of Health     Financial Resource Strain: Low Risk  (2/12/2025)    Overall Financial Resource Strain (CARDIA)     Difficulty of Paying Living Expenses: Not hard at all   Food Insecurity: Not on file   Transportation Needs: No Transportation Needs (2/26/2025)    OASIS : Transportation     Lack of Transportation (Medical): No     Lack of Transportation (Non-Medical): No     Patient Unable or Declines to Respond: No   Physical Activity: Not on file   Stress: Not on file   Social Connections: Feeling Socially Integrated (2/26/2025)    OASIS : Social Isolation     Frequency of experiencing loneliness or isolation: Never   Intimate Partner Violence: Not on file   Housing Stability: Low Risk  (2/12/2025)    Housing Stability Vital Sign     Unable to Pay for Housing in the Last Year: No     Number of Times Moved in the Last Year: 0     Homeless in the Last Year: No       Objective      Physical Exam:  There were no vitals taken for this visit.  General : alert and oriented X3, no acute distress  Eyes : EOMI   BMI: 27.85kg/m2    Provider Impressions  Ms. WATERS is a 54 year year old female with a history of primary HPT, hypercalcemia, meniscus tear, knee arthritis (s/p cortisol shots), who presents for medical weight management.     She sees Peter Roy for her hypercalcemia / primary HPT.     1. Weight Management : Reviewed the principles of energy metabolism, caloric intake and expenditure, and rationale for a treatment program. Also reinforced need for reduced calorie, low fat diet and increased physical activity.     2. Nutrition :   4/6/22 : 184lb, 29.72kg/m2  5/2/22: 178.2lb, 28.75kg/m2  6/1/22: 171.8lb, 27.68kg/m2  8/23/22: 169lb, 27.28kg/m2  9/28/22 : 166lb, 26.83kg/m2  10/25/22 : 168lb, 27kg/m2  12/21/22 : 169lb,  27.88kg/m2  2/8/23 : 170lb, 27.44kg/m2  7/11/23 : 181lb, 29.21kg/m2  8/29/23: 177.7lb, 28.64kg/m2  1/10/24: 178.2lb, 28.76kg/m2  4/17/24: 181.5lb, 29.29kg/m2  11/19/24: 181lb, 29.21kg/m2  6/10/25: 175.2lb, 27.85kg/m2     3. Sleep : is fine.     4. Stress : needs to have a knee replacement in 2/2025.     5. Exercise : is doing [employer] 2x/wk - doing the circuit of weights.     6. Appetite : Taking Phentermine half tablet a day. Helping with appetite and helping to make her feel more full. Feels that it has helped her stay in control and fight cravings.   4/6/22 : took phentermine for 3 months.     Tried : Qsymia - but she had side effects of extreme fatigue - stopped.  Wrote for: Buproprion XL 150mg/d, and Naltrexone 50mg --NEVER TOOK.     --taking phentermine. -- minimal benefit  --On semaglutide from [facility] 0.6mg/wk --> will increase to 1.2mg/wk.     7. Goal : to decrease snacking at night time.     FU in about a month.  Revital Vicente Tapia MD            [1]   Current Outpatient Medications:     amoxicillin (Amoxil) 500 mg capsule, Take 4 Tablets 1 Hour Prior to Dental Procedure or Cleaning., Disp: 4 capsule, Rfl: 6    multivitamin tablet, Take 1 tablet by mouth once daily., Disp: , Rfl:     pantoprazole (ProtoNix) 40 mg EC tablet, Take 1 tablet (40 mg) by mouth once daily. Do not crush, chew, or split., Disp: 90 tablet, Rfl: 1    semaglutide, Inject 0.6mg under the skin once a week, Disp: 2 mL, Rfl: 4    sertraline (Zoloft) 50 mg tablet, Take 1 tablet (50 mg) by mouth once daily., Disp: 90 tablet, Rfl: 1  [2]   Past Medical History:  Diagnosis Date    Anxiety     Arthritis     Depression     Hyperparathyroidism (Multi)     Personal history of other specified conditions 08/17/2018    History of fatigue    Personal history of other specified conditions 01/29/2016    History of weight gain    Thyroid nodule    [3]   Past Surgical History:  Procedure Laterality Date    BREAST BIOPSY Right     Hx  Right benign core biopsy    BREAST SURGERY  02/24/2015    Breast Surgery    JOINT REPLACEMENT  02/12/2025    PARATHYROID GLAND SURGERY  05/22    THYROIDECTOMY, PARTIAL

## 2025-06-10 ENCOUNTER — APPOINTMENT (OUTPATIENT)
Dept: ENDOCRINOLOGY | Facility: CLINIC | Age: 55
End: 2025-06-10
Payer: COMMERCIAL

## 2025-06-10 VITALS — HEIGHT: 67 IN | WEIGHT: 175.2 LBS | BODY MASS INDEX: 27.5 KG/M2

## 2025-06-10 DIAGNOSIS — Z76.89 ENCOUNTER FOR WEIGHT MANAGEMENT: ICD-10-CM

## 2025-06-10 DIAGNOSIS — E66.3 OVERWEIGHT (BMI 25.0-29.9): Primary | ICD-10-CM

## 2025-06-10 PROCEDURE — 3008F BODY MASS INDEX DOCD: CPT | Performed by: INTERNAL MEDICINE

## 2025-06-10 PROCEDURE — 99215 OFFICE O/P EST HI 40 MIN: CPT | Performed by: INTERNAL MEDICINE

## 2025-08-21 ENCOUNTER — HOSPITAL ENCOUNTER (OUTPATIENT)
Dept: RADIOLOGY | Facility: CLINIC | Age: 55
Discharge: HOME | End: 2025-08-21
Payer: COMMERCIAL

## 2025-08-21 ENCOUNTER — APPOINTMENT (OUTPATIENT)
Dept: ORTHOPEDIC SURGERY | Facility: CLINIC | Age: 55
End: 2025-08-21
Payer: COMMERCIAL

## 2025-08-21 VITALS — WEIGHT: 175 LBS | BODY MASS INDEX: 27.47 KG/M2 | HEIGHT: 67 IN

## 2025-08-21 DIAGNOSIS — M19.049 CMC ARTHRITIS: Primary | ICD-10-CM

## 2025-08-21 DIAGNOSIS — M17.0 PRIMARY OSTEOARTHRITIS OF BOTH KNEES: ICD-10-CM

## 2025-08-21 DIAGNOSIS — M79.645 THUMB PAIN, LEFT: ICD-10-CM

## 2025-08-21 PROCEDURE — 20600 DRAIN/INJ JOINT/BURSA W/O US: CPT | Mod: LT | Performed by: PHYSICIAN ASSISTANT

## 2025-08-21 PROCEDURE — 73130 X-RAY EXAM OF HAND: CPT | Mod: LEFT SIDE | Performed by: RADIOLOGY

## 2025-08-21 PROCEDURE — 99213 OFFICE O/P EST LOW 20 MIN: CPT | Performed by: PHYSICIAN ASSISTANT

## 2025-08-21 PROCEDURE — 99213 OFFICE O/P EST LOW 20 MIN: CPT | Mod: 25

## 2025-08-21 PROCEDURE — 73130 X-RAY EXAM OF HAND: CPT | Mod: LT

## 2025-08-21 PROCEDURE — 2500000004 HC RX 250 GENERAL PHARMACY W/ HCPCS (ALT 636 FOR OP/ED): Performed by: PHYSICIAN ASSISTANT

## 2025-08-21 PROCEDURE — 20610 DRAIN/INJ JOINT/BURSA W/O US: CPT | Mod: RT | Performed by: PHYSICIAN ASSISTANT

## 2025-08-21 RX ORDER — LIDOCAINE HYDROCHLORIDE 10 MG/ML
4 INJECTION, SOLUTION INFILTRATION; PERINEURAL
Status: COMPLETED | OUTPATIENT
Start: 2025-08-21 | End: 2025-08-21

## 2025-08-21 RX ORDER — TRIAMCINOLONE ACETONIDE 40 MG/ML
40 INJECTION, SUSPENSION INTRA-ARTICULAR; INTRAMUSCULAR
Status: COMPLETED | OUTPATIENT
Start: 2025-08-21 | End: 2025-08-21

## 2025-08-21 RX ORDER — LIDOCAINE HYDROCHLORIDE 10 MG/ML
0.5 INJECTION, SOLUTION INFILTRATION; PERINEURAL
Status: COMPLETED | OUTPATIENT
Start: 2025-08-21 | End: 2025-08-21

## 2025-08-21 RX ORDER — TRIAMCINOLONE ACETONIDE 40 MG/ML
20 INJECTION, SUSPENSION INTRA-ARTICULAR; INTRAMUSCULAR
Status: COMPLETED | OUTPATIENT
Start: 2025-08-21 | End: 2025-08-21

## 2025-08-21 RX ADMIN — LIDOCAINE HYDROCHLORIDE 0.5 ML: 10 INJECTION, SOLUTION INFILTRATION; PERINEURAL at 10:08

## 2025-08-21 RX ADMIN — TRIAMCINOLONE ACETONIDE 40 MG: 400 INJECTION, SUSPENSION INTRA-ARTICULAR; INTRAMUSCULAR at 10:08

## 2025-08-21 RX ADMIN — TRIAMCINOLONE ACETONIDE 20 MG: 400 INJECTION, SUSPENSION INTRA-ARTICULAR; INTRAMUSCULAR at 10:08

## 2025-08-21 RX ADMIN — LIDOCAINE HYDROCHLORIDE 4 ML: 10 INJECTION, SOLUTION INFILTRATION; PERINEURAL at 10:08

## 2025-08-28 ENCOUNTER — APPOINTMENT (OUTPATIENT)
Dept: ORTHOPEDIC SURGERY | Facility: CLINIC | Age: 55
End: 2025-08-28
Payer: COMMERCIAL

## 2025-09-10 ENCOUNTER — APPOINTMENT (OUTPATIENT)
Dept: ENDOCRINOLOGY | Facility: CLINIC | Age: 55
End: 2025-09-10
Payer: COMMERCIAL

## 2025-11-13 ENCOUNTER — APPOINTMENT (OUTPATIENT)
Dept: PRIMARY CARE | Facility: CLINIC | Age: 55
End: 2025-11-13
Payer: COMMERCIAL

## 2026-02-19 ENCOUNTER — APPOINTMENT (OUTPATIENT)
Dept: ENDOCRINOLOGY | Facility: CLINIC | Age: 56
End: 2026-02-19
Payer: COMMERCIAL

## (undated) DEVICE — GLOVE, SURGICAL, PROTEXIS PI , 8.0, PF, LF

## (undated) DEVICE — NEEDLE, SPINAL, QUINCKE, 18 G X 3.5 IN, PINK HUB

## (undated) DEVICE — BANDAGE, COFLEX, 6 X 5 YDS, TAN, STERILE, LF

## (undated) DEVICE — CUFF, TOURNIQUET, 30 X 4, DUAL PORT/SNGL BLADDER, DISP, LF

## (undated) DEVICE — BLANKET, LOWER BODY, VHA PLUS, ADULT

## (undated) DEVICE — DRAPE, ISOLATION, INCISE, W/POUCH, STERI DRAPE, 125 X 83 IN, DISPOSABLE, STERILE

## (undated) DEVICE — Device

## (undated) DEVICE — DRAPE, SHEET, U, W/ADHESIVE STRIP, IMPERVIOUS, 60 X 70 IN, DISPOSABLE, LF, STERILE

## (undated) DEVICE — SUTURE, VICRYL, 2-0, 18 IN CP-2, UNDYED

## (undated) DEVICE — SYRINGE, 50 CC, LUER LOCK

## (undated) DEVICE — CHANNEL DRAIN, BARD, 15FR, ROUND HUBLESS, FULL FLUTED

## (undated) DEVICE — DRAPE, IRRIGATION, W/POUCH, ADHESIVE STRIP, STERI DRAPE, 19 X 23 IN, DISPOSABLE, STERILE

## (undated) DEVICE — TUBING, IRRIGATION, HIGH FLOW, HAND PIECE SET

## (undated) DEVICE — SOLUTION, INJECTION, 0.9% SODIUM CHL, USP LIFECARE 1000 MI

## (undated) DEVICE — HOOD, SURGICAL, FLYTE HYBRID

## (undated) DEVICE — GLOVE, SURGICAL, PROTEXIS PI , 6.5, PF, LF

## (undated) DEVICE — SUTURE, ETHIBOND, P2, V-37, 30 IN, GREEN

## (undated) DEVICE — SYRINGE, 60 CC, IRRIGATION, BULB, CONTRO-BULB, PAPER POUCH

## (undated) DEVICE — DRESSING, MEPILEX, BORDER LIGHT, 3 X 3 IN

## (undated) DEVICE — SUTURE, MONOCRYL, 3-0, 27 IN, PS-2, UNDYED

## (undated) DEVICE — STRIP, SKIN CLOSURE, STERI STRIP, REINFORCED, 0.5 X 4 IN

## (undated) DEVICE — GLOVE, SURGICAL, PROTEXIS PI BLUE W/NEUTHERA, 7.0, PF, LF

## (undated) DEVICE — GLOVE, SURGICAL, PROTEXIS PI , 7.5, PF, LF

## (undated) DEVICE — DRAIN, WOUND, ROUND, W/TROCAR, HOLE PATTERN, 10 IN, MEDIUM/LARGE, 3/16 X 49 IN

## (undated) DEVICE — SUTURE, VICRYL, 0, 18 IN, CT-1, UNDYED